# Patient Record
Sex: MALE | Race: WHITE | Employment: FULL TIME | URBAN - METROPOLITAN AREA
[De-identification: names, ages, dates, MRNs, and addresses within clinical notes are randomized per-mention and may not be internally consistent; named-entity substitution may affect disease eponyms.]

---

## 2017-02-20 ENCOUNTER — TELEPHONE (OUTPATIENT)
Dept: CARDIOLOGY CLINIC | Age: 47
End: 2017-02-20

## 2017-03-17 ENCOUNTER — OFFICE VISIT (OUTPATIENT)
Dept: CARDIOLOGY CLINIC | Age: 47
End: 2017-03-17

## 2017-03-17 VITALS
HEART RATE: 68 BPM | WEIGHT: 221 LBS | DIASTOLIC BLOOD PRESSURE: 72 MMHG | SYSTOLIC BLOOD PRESSURE: 100 MMHG | OXYGEN SATURATION: 96 % | BODY MASS INDEX: 30.82 KG/M2

## 2017-03-17 DIAGNOSIS — I25.10 CORONARY ARTERY DISEASE INVOLVING NATIVE CORONARY ARTERY OF NATIVE HEART WITHOUT ANGINA PECTORIS: Primary | ICD-10-CM

## 2017-03-17 DIAGNOSIS — I10 ESSENTIAL HYPERTENSION: ICD-10-CM

## 2017-03-17 DIAGNOSIS — E78.2 MIXED HYPERLIPIDEMIA: ICD-10-CM

## 2017-03-17 PROCEDURE — 99214 OFFICE O/P EST MOD 30 MIN: CPT | Performed by: NURSE PRACTITIONER

## 2017-05-03 RX ORDER — CLOPIDOGREL BISULFATE 75 MG/1
75 TABLET ORAL DAILY
Qty: 90 TABLET | Refills: 1 | Status: SHIPPED | OUTPATIENT
Start: 2017-05-03 | End: 2017-10-31 | Stop reason: SDUPTHER

## 2017-08-21 ENCOUNTER — OFFICE VISIT (OUTPATIENT)
Dept: CARDIOLOGY CLINIC | Age: 47
End: 2017-08-21

## 2017-08-21 VITALS
SYSTOLIC BLOOD PRESSURE: 130 MMHG | HEART RATE: 70 BPM | HEIGHT: 71 IN | BODY MASS INDEX: 29.68 KG/M2 | DIASTOLIC BLOOD PRESSURE: 80 MMHG | WEIGHT: 212 LBS

## 2017-08-21 DIAGNOSIS — I25.10 CORONARY ARTERY DISEASE INVOLVING NATIVE CORONARY ARTERY OF NATIVE HEART WITHOUT ANGINA PECTORIS: Primary | ICD-10-CM

## 2017-08-21 DIAGNOSIS — R31.9 HEMATURIA: ICD-10-CM

## 2017-08-21 DIAGNOSIS — I10 ESSENTIAL HYPERTENSION: ICD-10-CM

## 2017-08-21 DIAGNOSIS — E78.00 HYPERCHOLESTEREMIA: ICD-10-CM

## 2017-08-21 PROCEDURE — 99214 OFFICE O/P EST MOD 30 MIN: CPT | Performed by: INTERNAL MEDICINE

## 2017-08-21 RX ORDER — ASPIRIN 81 MG/1
81 TABLET ORAL DAILY
Qty: 30 TABLET | Refills: 11 | Status: ON HOLD
Start: 2017-08-21 | End: 2022-08-05 | Stop reason: HOSPADM

## 2017-10-31 RX ORDER — CLOPIDOGREL BISULFATE 75 MG/1
TABLET ORAL
Qty: 90 TABLET | Refills: 1 | Status: SHIPPED | OUTPATIENT
Start: 2017-10-31 | End: 2018-03-23 | Stop reason: SDUPTHER

## 2018-02-06 RX ORDER — AMLODIPINE BESYLATE 5 MG/1
TABLET ORAL
Qty: 90 TABLET | Refills: 3 | Status: SHIPPED | OUTPATIENT
Start: 2018-02-06 | End: 2018-09-12 | Stop reason: SDUPTHER

## 2018-03-23 ENCOUNTER — OFFICE VISIT (OUTPATIENT)
Dept: CARDIOLOGY CLINIC | Age: 48
End: 2018-03-23

## 2018-03-23 VITALS
HEART RATE: 77 BPM | OXYGEN SATURATION: 94 % | BODY MASS INDEX: 29.72 KG/M2 | HEIGHT: 71 IN | DIASTOLIC BLOOD PRESSURE: 86 MMHG | WEIGHT: 212.3 LBS | SYSTOLIC BLOOD PRESSURE: 116 MMHG

## 2018-03-23 DIAGNOSIS — I25.10 CORONARY ARTERY DISEASE INVOLVING NATIVE CORONARY ARTERY OF NATIVE HEART WITHOUT ANGINA PECTORIS: Primary | ICD-10-CM

## 2018-03-23 DIAGNOSIS — E78.00 HYPERCHOLESTEREMIA: ICD-10-CM

## 2018-03-23 DIAGNOSIS — I10 ESSENTIAL HYPERTENSION: ICD-10-CM

## 2018-03-23 DIAGNOSIS — Z72.0 TOBACCO ABUSE: ICD-10-CM

## 2018-03-23 PROCEDURE — 99214 OFFICE O/P EST MOD 30 MIN: CPT | Performed by: INTERNAL MEDICINE

## 2018-03-23 RX ORDER — CLOPIDOGREL BISULFATE 75 MG/1
TABLET ORAL
Qty: 90 TABLET | Refills: 3 | Status: SHIPPED | OUTPATIENT
Start: 2018-03-23 | End: 2018-09-12 | Stop reason: SDUPTHER

## 2018-03-23 NOTE — COMMUNICATION BODY
[address]       H+P // CONSULT // OUTPATIENT VISIT // FOLLOWUP VISIT     Referring Doctor Amalia Mahmood [] Acute [x] Chronic     Symptom [x] None [] CP [] SOB [] Dizzy [] Palps [] Fatigue     Problems CAD, HTN, CHOL, Tobacco      HISTORY OF PRESENT ILLNESS     Doing well. Denies cp, sob. Compliant with meds. Not exercising. No statin due to myalgias. Symptom Y N Frequency Duration Severity Modifying Assoc Sx Other   CP [] [x]         SOB [] [x]         Dizzy [] [x]         Syncope [] [x]         Palpitations [] [x]           COMPLIANCE     Category Meds Diet Salt Exercise Tobacco Alcohol Drugs   Compliant [x] [x] [x] [] [x] [x] [x]   [x]Counseling given on all above above categories    HISTORY/ALLERGIES/ROS     MedHx:  has a past medical history of Hypertension and MI (mitral incompetence). SurgHx:  has a past surgical history that includes Coronary angioplasty with stent; Appendectomy; and Nasal septum surgery (11/2014). SocHx:   reports that he has been smoking Cigarettes. He has been smoking about 0.50 packs per day. He has never used smokeless tobacco. He reports that he does not drink alcohol or use drugs. FamHx: family history includes Diabetes in his father and mother; Heart Disease in his father; High Blood Pressure in his father and mother; High Cholesterol in his father and mother.    Allergies: Fenofibrate and Statins   ROS:  [x]Full ROS obtained and negative except as mentioned in HPI     MEDICATIONS      Current Outpatient Prescriptions   Medication Sig Dispense Refill    amLODIPine (NORVASC) 5 MG tablet TAKE 1 TABLET DAILY AS NEEDED IF BLOOD PRESSURE IS GREATER THAN 130/90 90 tablet 3    clopidogrel (PLAVIX) 75 MG tablet TAKE 1 TABLET DAILY 90 tablet 1    aspirin 81 MG EC tablet Take 1 tablet by mouth daily 30 tablet 11    metoprolol tartrate (LOPRESSOR) 25 MG tablet Take 1 tablet by mouth 2 times daily 3rd []Other:

## 2018-09-07 PROBLEM — E78.00 HYPERCHOLESTEREMIA: Status: ACTIVE | Noted: 2018-09-07

## 2018-09-07 PROBLEM — Z72.0 TOBACCO ABUSE: Status: ACTIVE | Noted: 2018-09-07

## 2018-09-07 NOTE — PROGRESS NOTES
[facility] [facility] [address]       H+P // CONSULT // OUTPATIENT VISIT // FOLLOWUP VISIT     Referring Doctor Bonnie Travis [] Acute [x] Chronic     Symptom [x] None [] CP [] SOB [] Dizzy [] Palps [] Fatigue     Problems CAD, HTN, CHOL, Tobacco      HISTORY OF PRESENT ILLNESS     Doing well. Denies cp, sob. Compliant with meds. Not exercising. No statin due to myalgias. Working long hours. Symptom Y N Frequency Duration Severity Modifying Assoc Sx Other   CP [] [x]         SOB [] [x]         Dizzy [] [x]         Syncope [] [x]         Palpitations [] [x]           COMPLIANCE     Category Meds Diet Salt Exercise Tobacco Alcohol Drugs   Compliant [x] [x] [x] [] [x] [x] [x]   [x]Counseling given on all above above categories    HISTORY/ALLERGIES/ROS     MedHx:  has a past medical history of Hypertension and MI (mitral incompetence). SurgHx:  has a past surgical history that includes Coronary angioplasty with stent; Appendectomy; and Nasal septum surgery (11/2014). SocHx:   reports that he has been smoking Cigarettes. He has been smoking about 0.50 packs per day. He has never used smokeless tobacco. He reports that he does not drink alcohol or use drugs. FamHx: family history includes Diabetes in his father and mother; Heart Disease in his father; High Blood Pressure in his father and mother; High Cholesterol in his father and mother.    Allergies: Fenofibrate and Statins   ROS:  [x]Full ROS obtained and negative except as mentioned in HPI     MEDICATIONS      Current Outpatient Prescriptions   Medication Sig Dispense Refill    clopidogrel (PLAVIX) 75 MG tablet TAKE 1 TABLET DAILY 90 tablet 3    metoprolol tartrate (LOPRESSOR) 25 MG tablet Take 1 tablet by mouth 2 times daily 3rd dose if HR up 180 tablet 3    amLODIPine (NORVASC) 5 MG tablet TAKE 1 TABLET DAILY AS NEEDED IF BLOOD PRESSURE IS GREATER THAN 130/90 90 tablet 3    aspirin 81 MG EC tablet Take 1 tablet by mouth daily 30 tablet 11    clonazePAM (KLONOPIN) 0.5 MG tablet 1 mg 2 times daily       tadalafil (CIALIS) 20 MG tablet Take 1 tablet by mouth as needed for Erectile Dysfunction 3 tablet 0    ALPRAZolam (XANAX) 1 MG tablet Take 1 mg by mouth 2 times daily        No current facility-administered medications for this visit.       Reviewed with patient and will remain unchanged except as mentioned in A/P  PHYSICAL EXAM     Vitals:    09/12/18 1117   BP: 126/76   Pulse: 72      Gen Alert, coop, no distress Heart  RRR, no MRG, nl apic impulse   Head NC, AT, no abnorm Abd  Soft, NT, +BS, no mass, no OM   Eyes PERRLA, conj/corn clear Ext  Ext nl, AT, no C/C/E   Nose Nares nl, no drain, NT Pulse 2+ and symmetric   Throat Lips, mucosa, tongue nl Skin Color/text/turg nl, no rash/lesions   Neck S/S, TM, NT, no bruit/JVD Psych Nl mood and affect   Lung CTA-B, unlabored, no DTP Lymph   No cervical or axillary LA   Ch wall NT, no deform Neuro  Nl gross M/S exam     ASSESSMENT AND PLAN      ~CAD   Date EF Results   Sx   No concerning   [facility]   [x]I         []II           []III          []IV   Hx   MVPCI   [facility] 10/10  11/16 40% PCI LAD and RCA (B [place])  PCI of LAD/kissing balloon diagonal with 1 KARLY   MPI 10/15 71% Small apical infarct, no ischemia   TTE 1/14 55% Apical anterior hk   Plan   Continue aggressive medical treatment at doses above   ~HTN  Today BP [x] Controlled [] Borderline [] Uncontrolled   Counseling [x] Diet/Salt [x] Exercise [x] Weight    Plan Continue current medications at doses detailed above  ~Hyperchol  Goal LDL [] <100 [x] <70     Counseling [x] Diet [x] Weight [x] Exercise   Lipid/liver Monitor [x] PCP [] Cardio [] Endocrine   Plan Allergic to statins, not interested in injectables at this time  6/18 HDL:23, LDL:157  ~Tobacco  Status [x] Smoker [] Previous smoker   Counseling [x] Risks [x] Cessation   Plan Continued avoidance of first and second hand smoke  ~Followup  Interval: 6

## 2018-09-12 ENCOUNTER — OFFICE VISIT (OUTPATIENT)
Dept: CARDIOLOGY CLINIC | Age: 48
End: 2018-09-12

## 2018-09-12 VITALS
DIASTOLIC BLOOD PRESSURE: 76 MMHG | SYSTOLIC BLOOD PRESSURE: 126 MMHG | HEART RATE: 72 BPM | BODY MASS INDEX: 28.84 KG/M2 | HEIGHT: 71 IN | WEIGHT: 206 LBS

## 2018-09-12 DIAGNOSIS — I25.10 CORONARY ARTERY DISEASE INVOLVING NATIVE CORONARY ARTERY OF NATIVE HEART WITHOUT ANGINA PECTORIS: Primary | ICD-10-CM

## 2018-09-12 DIAGNOSIS — E78.00 HYPERCHOLESTEREMIA: ICD-10-CM

## 2018-09-12 DIAGNOSIS — I10 ESSENTIAL HYPERTENSION: ICD-10-CM

## 2018-09-12 DIAGNOSIS — Z72.0 TOBACCO ABUSE: ICD-10-CM

## 2018-09-12 PROCEDURE — 99214 OFFICE O/P EST MOD 30 MIN: CPT | Performed by: INTERNAL MEDICINE

## 2018-09-12 RX ORDER — CLOPIDOGREL BISULFATE 75 MG/1
TABLET ORAL
Qty: 90 TABLET | Refills: 3 | Status: SHIPPED | OUTPATIENT
Start: 2018-09-12 | End: 2019-09-09 | Stop reason: SDUPTHER

## 2018-09-12 RX ORDER — AMLODIPINE BESYLATE 5 MG/1
5 TABLET ORAL DAILY
Qty: 90 TABLET | Refills: 3 | Status: SHIPPED | OUTPATIENT
Start: 2018-09-12 | End: 2019-10-08 | Stop reason: SDUPTHER

## 2018-09-12 NOTE — COMMUNICATION BODY
[address]       H+P // CONSULT // OUTPATIENT VISIT // FOLLOWUP VISIT     Referring Doctor Estella Nieves [] Acute [x] Chronic     Symptom [x] None [] CP [] SOB [] Dizzy [] Palps [] Fatigue     Problems CAD, HTN, CHOL, Tobacco      HISTORY OF PRESENT ILLNESS     Doing well. Denies cp, sob. Compliant with meds. Not exercising. No statin due to myalgias. Working long hours. Symptom Y N Frequency Duration Severity Modifying Assoc Sx Other   CP [] [x]         SOB [] [x]         Dizzy [] [x]         Syncope [] [x]         Palpitations [] [x]           COMPLIANCE     Category Meds Diet Salt Exercise Tobacco Alcohol Drugs   Compliant [x] [x] [x] [] [x] [x] [x]   [x]Counseling given on all above above categories    HISTORY/ALLERGIES/ROS     MedHx:  has a past medical history of Hypertension and MI (mitral incompetence). SurgHx:  has a past surgical history that includes Coronary angioplasty with stent; Appendectomy; and Nasal septum surgery (11/2014). SocHx:   reports that he has been smoking Cigarettes. He has been smoking about 0.50 packs per day. He has never used smokeless tobacco. He reports that he does not drink alcohol or use drugs. FamHx: family history includes Diabetes in his father and mother; Heart Disease in his father; High Blood Pressure in his father and mother; High Cholesterol in his father and mother.    Allergies: Fenofibrate and Statins   ROS:  [x]Full ROS obtained and negative except as mentioned in HPI     MEDICATIONS      Current Outpatient Prescriptions   Medication Sig Dispense Refill    clopidogrel (PLAVIX) 75 MG tablet TAKE 1 TABLET DAILY 90 tablet 3    metoprolol tartrate (LOPRESSOR) 25 MG tablet Take 1 tablet by mouth 2 times daily 3rd dose if HR up 180 tablet 3    amLODIPine (NORVASC) 5 MG tablet TAKE 1 TABLET DAILY AS NEEDED IF BLOOD PRESSURE IS GREATER THAN 130/90 90 tablet 3    aspirin 81 MG EC months, early appt  Tests/Labs: None    Scribe Attestation  I, Lora Vega, am scribing for and in the presence of Varsha Vick MD.   Signed, Lora Vega 09/07/18 2:00 PM   Provider [address] is working as a scribe for and in the presence of kiera Vick MD). Working as a scribe, Lora Vega may have prepopulated components of this note with my historical  intellectual property under my direct supervision. Any additions to this intellectual property were performed in my presence and at my direction. Furthermore, the content and accuracy of this note have been reviewed by kiera Vick MD).

## 2018-09-12 NOTE — LETTER
[address]  [address] [place] [address] [address] [address]  Phone: [phone]  Fax: [phone]    Tripp Grijalva MD        September 12, 2018     Yan REYES  [address] [address]    Patient: Surekha Talamantes  MR Number: [id number]  YOB: 1970  Date of Visit: 9/12/2018    Dear Dr. Yan REYES:    Below are the relevant portions of my assessment and plan of care. [address]       H+P // CONSULT // OUTPATIENT VISIT // FOLLOWUP VISIT     Referring Doctor Mel Lowry [] Acute [x] Chronic     Symptom [x] None [] CP [] SOB [] Dizzy [] Palps [] Fatigue     Problems CAD, HTN, CHOL, Tobacco      HISTORY OF PRESENT ILLNESS     Doing well. Denies cp, sob. Compliant with meds. Not exercising. No statin due to myalgias. Working long hours. Symptom Y N Frequency Duration Severity Modifying Assoc Sx Other   CP [] [x]         SOB [] [x]         Dizzy [] [x]         Syncope [] [x]         Palpitations [] [x]           COMPLIANCE     Category Meds Diet Salt Exercise Tobacco Alcohol Drugs   Compliant [x] [x] [x] [] [x] [x] [x]   [x]Counseling given on all above above categories    HISTORY/ALLERGIES/ROS     MedHx:  has a past medical history of Hypertension and MI (mitral incompetence). SurgHx:  has a past surgical history that includes Coronary angioplasty with stent; Appendectomy; and Nasal septum surgery (11/2014). SocHx:   reports that he has been smoking Cigarettes. He has been smoking about 0.50 packs per day. He has never used smokeless tobacco. He reports that he does not drink alcohol or use drugs. FamHx: family history includes Diabetes in his father and mother; Heart Disease in his father; High Blood Pressure in his father and mother; High Cholesterol in his father and mother.    Allergies: Fenofibrate and Statins ROS:  [x]Full ROS obtained and negative except as mentioned in HPI     MEDICATIONS      Current Outpatient Prescriptions   Medication Sig Dispense Refill    clopidogrel (PLAVIX) 75 MG tablet TAKE 1 TABLET DAILY 90 tablet 3    metoprolol tartrate (LOPRESSOR) 25 MG tablet Take 1 tablet by mouth 2 times daily 3rd dose if HR up 180 tablet 3    amLODIPine (NORVASC) 5 MG tablet TAKE 1 TABLET DAILY AS NEEDED IF BLOOD PRESSURE IS GREATER THAN 130/90 90 tablet 3    aspirin 81 MG EC tablet Take 1 tablet by mouth daily 30 tablet 11    clonazePAM (KLONOPIN) 0.5 MG tablet 1 mg 2 times daily       tadalafil (CIALIS) 20 MG tablet Take 1 tablet by mouth as needed for Erectile Dysfunction 3 tablet 0    ALPRAZolam (XANAX) 1 MG tablet Take 1 mg by mouth 2 times daily        No current facility-administered medications for this visit.       Reviewed with patient and will remain unchanged except as mentioned in A/P  PHYSICAL EXAM     Vitals:    09/12/18 1117   BP: 126/76   Pulse: 72      Gen Alert, coop, no distress Heart  RRR, no MRG, nl apic impulse   Head NC, AT, no abnorm Abd  Soft, NT, +BS, no mass, no OM   Eyes PERRLA, conj/corn clear Ext  Ext nl, AT, no C/C/E   Nose Nares nl, no drain, NT Pulse 2+ and symmetric   Throat Lips, mucosa, tongue nl Skin Color/text/turg nl, no rash/lesions   Neck S/S, TM, NT, no bruit/JVD Psych Nl mood and affect   Lung CTA-B, unlabored, no DTP Lymph   No cervical or axillary LA   Ch wall NT, no deform Neuro  Nl gross M/S exam     ASSESSMENT AND PLAN      ~CAD   Date EF Results   Sx   No concerning   [facility]   [x]I         []II           []III          []IV   Hx   MVPCI   [facility] 10/10  11/16 40% PCI LAD and RCA (B [place])  PCI of LAD/kissing balloon diagonal with 1 KARLY   MPI 10/15 71% Small apical infarct, no ischemia   TTE 1/14 55% Apical anterior hk   Plan   Continue aggressive medical treatment at doses above   ~HTN  Today BP [x] Controlled [] Borderline [] Uncontrolled Counseling [x] Diet/Salt [x] Exercise [x] Weight    Plan Continue current medications at doses detailed above  ~Hyperchol  Goal LDL [] <100 [x] <70     Counseling [x] Diet [x] Weight [x] Exercise   Lipid/liver Monitor [x] PCP [] Cardio [] Endocrine   Plan Allergic to statins, not interested in injectables at this time  6/18 HDL:23, LDL:157  ~Tobacco  Status [x] Smoker [] Previous smoker   Counseling [x] Risks [x] Cessation   Plan Continued avoidance of first and second hand smoke  ~Followup  Interval: 6 months, early appt  Tests/Labs: None    Scribe Attestation  Abelardo HUMPHREY, am scribing for and in the presence of Lelia Jaeger MD.   Signed, Abelardo Perez 09/07/18 2:00 PM   Provider Margaux Veliz is working as a scribe for and in the presence of me (Lelia Jaeger MD). Working as a scribe, Abelardo Perez may have prepopulated components of this note with my historical  intellectual property under my direct supervision. Any additions to this intellectual property were performed in my presence and at my direction. Furthermore, the content and accuracy of this note have been reviewed by me Lelia Jaeger MD). If you have questions, please do not hesitate to call me. I look forward to following Juan Diego Sheffield along with you.     Sincerely,        Iggy Lainez MD

## 2019-04-29 NOTE — PATIENT INSTRUCTIONS
If you need to take Ibuprofen, take it at least 2 hours after you take your aspirin. Try to avoid antiinflammatories.

## 2019-04-29 NOTE — PROGRESS NOTES
[address]     H+P // CONSULT // OUTPATIENT VISIT // FOLLOWUP VISIT     Referring Doctor Shan Montes   Encounter Type Followup     CHIEF COMPLAINT     Visit Type Chronic   Symptoms None   Problems CAD, HTN, CHOL, TOB     HISTORY OF PRESENT ILLNESS      Doing well. No new concerns. Mild pain from opening cabinet just under collarbone right side. BP in good range at home. Losing weight with brother. Still smoking.  Denies cp, sob, dizziness, syncope, palpitations.  Compliant with CV meds listed below without notable side effects. COMPLIANCE     Category Meds Diet Salt Exercise Tobacco Alcohol Drugs   Compliant [x] [x] [x] [x] [] [x] [x]   [x]Counseling given on all above above categories    HISTORY/ALLERGIES/ROS     MedHx:  has a past medical history of Hypertension and MI (mitral incompetence). SurgHx:  has a past surgical history that includes Coronary angioplasty with stent; Appendectomy; and Nasal septum surgery (11/2014). SocHx:   reports that he has been smoking cigarettes. He has been smoking about 0.50 packs per day. He has never used smokeless tobacco. He reports that he does not drink alcohol or use drugs. FamHx: family history includes Diabetes in his father and mother; Heart Disease in his father; High Blood Pressure in his father and mother; High Cholesterol in his father and mother.    Allergies: Fenofibrate and Statins   ROS:  [x]Full ROS obtained and negative except as mentioned in HPI     MEDICATIONS      Current Outpatient Medications   Medication Sig Dispense Refill    clopidogrel (PLAVIX) 75 MG tablet TAKE 1 TABLET DAILY 90 tablet 3    metoprolol tartrate (LOPRESSOR) 25 MG tablet Take 1 tablet by mouth 2 times daily 3rd dose if HR up 180 tablet 3    amLODIPine (NORVASC) 5 MG tablet Take 1 tablet by mouth daily 90 tablet 3    aspirin 81 MG EC tablet Take 1 tablet by mouth daily 30 tablet 11    clonazePAM (KLONOPIN) 0.5 MG tablet 1 mg 2 times daily  tadalafil (CIALIS) 20 MG tablet Take 1 tablet by mouth as needed for Erectile Dysfunction 3 tablet 0    ALPRAZolam (XANAX) 1 MG tablet Take 1 mg by mouth 2 times daily        No current facility-administered medications for this visit.       Reviewed with patient and will remain unchanged except as mentioned in A/P  PHYSICAL EXAM     Vitals:    05/06/19 0843   BP: 122/60   Pulse: 58      Gen Alert, coop, no distress Heart  RRR, no MRG, nl apical impulse   Head NC, AT, no abnorm Abd  Soft, NT, +BS, no mass, no OM   Eyes PER, conj/corn clear Ext  Ext nl, AT, no C/C/E   Nose Nares nl, no drain, NT Pulse 2+ and symmetric   Throat Lips, mucosa, tongue nl Skin Col/text/turg nl, no vis rash/les   Neck S/S, TM, NT, no bruit/JVD Psych Nl mood and affect   Lung CTA-B, unlabored, no DTP Lymph   No cervical or axillary LA   Ch wall NT, no deform Neuro  Nl gross M/S exam     ASSESSMENT AND PLAN      ~CAD   Date EF Results   Sx   No concerning   [facility]   [x]I         []II           []III          []IV   Hx   MVPCI   [facility] 10/10  11/16 40% PCI LAD and RCA (B [place])  PCI of LAD/kissing balloon diagonal with 1 KARLY   MPI 10/15 71% Small apical infarct, no ischemia   TTE 1/14 55% Apical anterior hk   Plan   Continue aggressive medical treatment at doses above   ~HTN  Today BP Controlled   Counseling Counseled on diet/salt, exercise and ideal body weight   Plan Continue current meds at doses above   ~Hyperchol  Goal LDL <70   Counseling Counseled on diet, exercise and ideal body weight   Plan PCP liver/lipid surveillance  Allergic to statins, does not want injectables   6/18 HDL:23, LDL:157  ~Tobacco  Status [x] Smoker [] Previous smoker   Counseling [x] Risks [x] Cessation   Plan Continued avoidance of first and second hand smoke  ~Followup  Interval: 6 months  Tests/Labs: None    Scribe Attestation  I, Dennie Saupe, am scribing for and in the presence of Leah Wheatley MD.   Signed, Dennie Saupe 04/29/19 10:53 AM   Provider Wil Ward is working as a scribe for and in the presence of kiera Echevarria MD). Working as a scribe, Syds Rima may have prepopulated components of this note with my historical  intellectual property under my direct supervision. Any additions to this intellectual property were performed in my presence and at my direction.   Furthermore, the content and accuracy of this note have been reviewed by kiera Echevarria MD).  5/6/2019 8:54 AM

## 2019-05-06 ENCOUNTER — OFFICE VISIT (OUTPATIENT)
Dept: CARDIOLOGY CLINIC | Age: 49
End: 2019-05-06
Payer: COMMERCIAL

## 2019-05-06 VITALS
HEART RATE: 58 BPM | BODY MASS INDEX: 28.73 KG/M2 | SYSTOLIC BLOOD PRESSURE: 122 MMHG | DIASTOLIC BLOOD PRESSURE: 60 MMHG | HEIGHT: 71 IN

## 2019-05-06 DIAGNOSIS — I25.10 CORONARY ARTERY DISEASE INVOLVING NATIVE CORONARY ARTERY OF NATIVE HEART WITHOUT ANGINA PECTORIS: Primary | ICD-10-CM

## 2019-05-06 DIAGNOSIS — I10 ESSENTIAL HYPERTENSION: ICD-10-CM

## 2019-05-06 DIAGNOSIS — Z72.0 TOBACCO ABUSE: ICD-10-CM

## 2019-05-06 DIAGNOSIS — E78.00 HYPERCHOLESTEREMIA: ICD-10-CM

## 2019-05-06 PROCEDURE — 99214 OFFICE O/P EST MOD 30 MIN: CPT | Performed by: INTERNAL MEDICINE

## 2019-05-06 NOTE — LETTER
[address] [place] [address] [address] [place] [address] [address] [address]  Phone: [phone]  Fax: [phone]    Yadi Soliman MD        May 6, 2019     Sean REYES  [address] [address]    Patient: Ma Schilder  MR Number: [id number]  YOB: 1970  Date of Visit: 5/6/2019    Dear Dr. Sean HDEZT:      [address]     H+P // CONSULT // OUTPATIENT VISIT // FOLLOWUP VISIT     Referring Doctor Yao Galdamez   Encounter Type Followup     CHIEF COMPLAINT     Visit Type Chronic   Symptoms None   Problems CAD, HTN, CHOL, TOB     HISTORY OF PRESENT ILLNESS     ? Doing well. No new concerns. Mild pain from opening cabinet just under collarbone right side. BP in good range at home. Losing weight with brother. Still smoking. ? Denies cp, sob, dizziness, syncope, palpitations. ? Compliant with CV meds listed below without notable side effects. COMPLIANCE     Category Meds Diet Salt Exercise Tobacco Alcohol Drugs   Compliant [x] [x] [x] [x] [] [x] [x]   [x]Counseling given on all above above categories    HISTORY/ALLERGIES/ROS     MedHx:  has a past medical history of Hypertension and MI (mitral incompetence). SurgHx:  has a past surgical history that includes Coronary angioplasty with stent; Appendectomy; and Nasal septum surgery (11/2014). SocHx:   reports that he has been smoking cigarettes. He has been smoking about 0.50 packs per day. He has never used smokeless tobacco. He reports that he does not drink alcohol or use drugs. FamHx: family history includes Diabetes in his father and mother; Heart Disease in his father; High Blood Pressure in his father and mother; High Cholesterol in his father and mother.    Allergies: Fenofibrate and Statins   ROS:  [x]Full ROS obtained and negative except as mentioned in HPI     MEDICATIONS      Current Outpatient Medications   Medication Sig Dispense Refill  clopidogrel (PLAVIX) 75 MG tablet TAKE 1 TABLET DAILY 90 tablet 3    metoprolol tartrate (LOPRESSOR) 25 MG tablet Take 1 tablet by mouth 2 times daily 3rd dose if HR up 180 tablet 3    amLODIPine (NORVASC) 5 MG tablet Take 1 tablet by mouth daily 90 tablet 3    aspirin 81 MG EC tablet Take 1 tablet by mouth daily 30 tablet 11    clonazePAM (KLONOPIN) 0.5 MG tablet 1 mg 2 times daily       tadalafil (CIALIS) 20 MG tablet Take 1 tablet by mouth as needed for Erectile Dysfunction 3 tablet 0    ALPRAZolam (XANAX) 1 MG tablet Take 1 mg by mouth 2 times daily        No current facility-administered medications for this visit.       Reviewed with patient and will remain unchanged except as mentioned in A/P  PHYSICAL EXAM     Vitals:    05/06/19 0843   BP: 122/60   Pulse: 58      Gen Alert, coop, no distress Heart  RRR, no MRG, nl apical impulse   Head NC, AT, no abnorm Abd  Soft, NT, +BS, no mass, no OM   Eyes PER, conj/corn clear Ext  Ext nl, AT, no C/C/E   Nose Nares nl, no drain, NT Pulse 2+ and symmetric   Throat Lips, mucosa, tongue nl Skin Col/text/turg nl, no vis rash/les   Neck S/S, TM, NT, no bruit/JVD Psych Nl mood and affect   Lung CTA-B, unlabored, no DTP Lymph   No cervical or axillary LA   Ch wall NT, no deform Neuro  Nl gross M/S exam     ASSESSMENT AND PLAN      ~CAD   Date EF Results   Sx   No concerning   [facility]   [x]I         []II           []III          []IV   Hx   MVPCI   [facility] 10/10  11/16 40% PCI LAD and RCA (B [place])  PCI of LAD/kissing balloon diagonal with 1 KARLY   MPI 10/15 71% Small apical infarct, no ischemia   TTE 1/14 55% Apical anterior hk   Plan   Continue aggressive medical treatment at doses above   ~HTN  Today BP Controlled   Counseling Counseled on diet/salt, exercise and ideal body weight   Plan Continue current meds at doses above   ~Hyperchol  Goal LDL <70   Counseling Counseled on diet, exercise and ideal body weight   Plan PCP liver/lipid surveillance

## 2019-09-09 RX ORDER — CLOPIDOGREL BISULFATE 75 MG/1
TABLET ORAL
Qty: 90 TABLET | Refills: 4 | Status: SHIPPED | OUTPATIENT
Start: 2019-09-09 | End: 2019-11-08 | Stop reason: ALTCHOICE

## 2019-10-10 RX ORDER — AMLODIPINE BESYLATE 5 MG/1
TABLET ORAL
Qty: 90 TABLET | Refills: 4 | Status: SHIPPED | OUTPATIENT
Start: 2019-10-10 | End: 2020-12-31

## 2019-11-08 ENCOUNTER — OFFICE VISIT (OUTPATIENT)
Dept: CARDIOLOGY CLINIC | Age: 49
End: 2019-11-08
Payer: COMMERCIAL

## 2019-11-08 VITALS
HEIGHT: 71 IN | HEART RATE: 68 BPM | RESPIRATION RATE: 18 BRPM | BODY MASS INDEX: 29.12 KG/M2 | DIASTOLIC BLOOD PRESSURE: 72 MMHG | SYSTOLIC BLOOD PRESSURE: 128 MMHG | WEIGHT: 208 LBS

## 2019-11-08 DIAGNOSIS — I25.10 CORONARY ARTERY DISEASE INVOLVING NATIVE CORONARY ARTERY OF NATIVE HEART WITHOUT ANGINA PECTORIS: Primary | ICD-10-CM

## 2019-11-08 DIAGNOSIS — E78.00 HYPERCHOLESTEREMIA: ICD-10-CM

## 2019-11-08 DIAGNOSIS — I10 ESSENTIAL HYPERTENSION: ICD-10-CM

## 2019-11-08 DIAGNOSIS — Z72.0 TOBACCO ABUSE: ICD-10-CM

## 2019-11-08 PROCEDURE — 99214 OFFICE O/P EST MOD 30 MIN: CPT | Performed by: INTERNAL MEDICINE

## 2020-02-24 NOTE — TELEPHONE ENCOUNTER
RX APPROVAL:      Refill:   Requested Prescriptions     Pending Prescriptions Disp Refills    metoprolol tartrate (LOPRESSOR) 25 MG tablet [Pharmacy Med Name: METOPROLOL TARTRATE TABS 25MG] 180 tablet 6     Sig: TAKE 1 TABLET TWICE A DAY, THIRD DOSE IF HEART RATE IS UP      Last OV: 11/8/19  Last EKG:   Last Labs: Results in Care Everywhere.    Most recent Deneen Guidry results list below: [facility] 3/15/19  Lab Results   Component Value Date    GLUCOSE 99 11/01/2016    BUN 13 11/01/2016    CREATININE 0.9 11/01/2016    LABGLOM >60 11/01/2016     11/01/2016    K 4.0 11/01/2016     11/01/2016    CO2 26 11/01/2016    CALCIUM 9.2 11/01/2016     Lab Results   Component Value Date     11/01/2016     11/01/2016    CO2 26 11/01/2016    ANIONGAP 11 11/01/2016    GLUCOSE 99 11/01/2016    BUN 13 11/01/2016    CREATININE 0.9 11/01/2016    LABGLOM >60 11/01/2016    GFRAA >60 11/01/2016    GFRAA >60 06/23/2012    CALCIUM 9.2 11/01/2016     No results found for: ALT, AST  Lab Results   Component Value Date    K 4.0 11/01/2016       Plan and labs reviewed

## 2020-05-06 PROBLEM — I25.83 CORONARY ARTERY DISEASE DUE TO LIPID RICH PLAQUE: Status: ACTIVE | Noted: 2020-05-06

## 2020-05-06 PROBLEM — I25.10 CORONARY ARTERY DISEASE DUE TO LIPID RICH PLAQUE: Status: ACTIVE | Noted: 2020-05-06

## 2020-05-19 NOTE — PROGRESS NOTES
[facility] [employer] [facility] [address]     H+P // CONSULT // OUTPATIENT VISIT // FOLLOWUP VISIT     Referring Doctor Chandana Sanchez   Encounter Type Followup     CHIEF COMPLAINT     Visit Type Chronic   Symptoms None   Problems CAD, HTN, CHOL, TOB     HISTORY OF PRESENT ILLNESS      GEN - Doing well. No new concerns.  CAD - Denies cp, sob, dizziness, syncope, palpitations.  HTN - Ambulatory BP readings in good range. No HA or dizziness.  CHOL - Last cholesterol reviewed and in good range. Tolerating statin without side effects.  TOB -  Continues to smoke 1ppd. Not interested in cessation.  MED - Compliant with CV meds listed below without notable side effects. HISTORY/ALLERGIES/ROS     MedHx:  has a past medical history of Hypertension and MI (mitral incompetence). SurgHx:  has a past surgical history that includes Coronary angioplasty with stent; Appendectomy; and Nasal septum surgery (11/2014). SocHx:  reports that he has been smoking cigarettes. He has been smoking about 0.50 packs per day. He has never used smokeless tobacco. He reports that he does not drink alcohol or use drugs. FamHx:  family history includes Diabetes in his father and mother; Heart Disease in his father; High Blood Pressure in his father and mother; High Cholesterol in his father and mother.    Allergies: Fenofibrate and Statins   ROS:  [x]Full ROS obtained and negative except as mentioned in HPI     MEDICATIONS      Current Outpatient Medications   Medication Sig Dispense Refill    metoprolol tartrate (LOPRESSOR) 25 MG tablet TAKE 1 TABLET TWICE A DAY, THIRD DOSE IF HEART RATE IS  tablet 1    amLODIPine (NORVASC) 5 MG tablet TAKE 1 TABLET DAILY 90 tablet 4    aspirin 81 MG EC tablet Take 1 tablet by mouth daily 30 tablet 11    clonazePAM (KLONOPIN) 0.5 MG tablet 1 mg 2 times daily       tadalafil (CIALIS) 20 MG tablet Take 1 tablet by mouth as needed for Erectile Dysfunction 3 tablet 0     No current components of this note with my historical  intellectual property under my direct supervision. Any additions to this intellectual property were performed in my presence and at my direction.   Furthermore, the content and accuracy of this note have been reviewed by me Yash Weiner MD).  5/21/2020 7:32 AM

## 2020-05-21 ENCOUNTER — OFFICE VISIT (OUTPATIENT)
Dept: CARDIOLOGY CLINIC | Age: 50
End: 2020-05-21
Payer: COMMERCIAL

## 2020-05-21 VITALS
HEART RATE: 61 BPM | HEIGHT: 71 IN | WEIGHT: 208.1 LBS | DIASTOLIC BLOOD PRESSURE: 78 MMHG | OXYGEN SATURATION: 97 % | BODY MASS INDEX: 29.13 KG/M2 | SYSTOLIC BLOOD PRESSURE: 116 MMHG

## 2020-05-21 PROCEDURE — 99214 OFFICE O/P EST MOD 30 MIN: CPT | Performed by: INTERNAL MEDICINE

## 2020-05-21 SDOH — HEALTH STABILITY: MENTAL HEALTH: HOW OFTEN DO YOU HAVE A DRINK CONTAINING ALCOHOL?: NEVER

## 2020-05-21 NOTE — LETTER
 metoprolol tartrate (LOPRESSOR) 25 MG tablet TAKE 1 TABLET TWICE A DAY, THIRD DOSE IF HEART RATE IS  tablet 1    amLODIPine (NORVASC) 5 MG tablet TAKE 1 TABLET DAILY 90 tablet 4    aspirin 81 MG EC tablet Take 1 tablet by mouth daily 30 tablet 11    clonazePAM (KLONOPIN) 0.5 MG tablet 1 mg 2 times daily       tadalafil (CIALIS) 20 MG tablet Take 1 tablet by mouth as needed for Erectile Dysfunction 3 tablet 0     No current facility-administered medications for this visit.       Reviewed with patient and will remain unchanged except as mentioned in A/P  PHYSICAL EXAM     Vitals:    05/21/20 1008   BP: 116/78   Pulse:    SpO2:       Gen Alert, coop, no distress Heart  Rrr, no mrg   Head NC, AT, no abnorm Abd  Soft, NT, +BS, no mass, no OM   Eyes PER, conj/corn clear Ext  Ext nl, AT, no C/C/E   Nose Nares nl, no drain, NT Pulse 1+ and symmetric   Throat Lips, mucosa, tongue nl Skin Col/text/turg nl, no vis rash/les   Neck S/S, TM, NT, no bruit/JVD Psych Nl mood and affect   Lung CTA-B, unlabored, no DTP Lymph   No cervical or axillary LA   Ch wall NT, no deform Neuro  Nl gross M/S exam     ASSESSMENT AND PLAN     *CAD   Date EF Results   Sx   No concerning   [facility]   [x]I         []II           []III          []IV   Hx   MVPCI   [facility] 10/10  11/16 40% PCI LAD and RCA ([place])  PCI of LAD/kissing balloon diagonal with 1 KARLY (Andrade)   MPI 10/15 71% Small apical infarct, no ischemia   TTE 1/14 55% Apical anterior hk   Plan   Continue aggressive medical treatment at doses above   *HTN  Status Controlled  Plan Counseled on diet/salt/exercise/weight, continue meds at doses above  *CHOL  Status  Uncontrolled with last LDL of 106 (goal <70) and HDL of 21 (3/19)  Plan Counseled on diet/exercise/weight, continue statin, lipid/liver surveillance per PCP  *TOB  Status Continues to smoke  Plan Continued avoidance of 1st and 2nd hand smoke, counseled on risks/cessation  *COMPLIANCE  Status Compliant Plan Discussed importance of compliance with meds/diet/salt/exercise; avoid tob/alc/drugs; patient verbalized understanding  *FOLLOWUP  6 months    [address] Hussain Zee, am scribing for and in the presence of Rhonda Lam MD.   SignedHussain 05/19/20 8:37 AM   Provider Damari Ward is working as a scribe for and in the presence of kiera Lam MD). Working as a scribe, Hussain Garcia may have prepopulated components of this note with my historical  intellectual property under my direct supervision. Any additions to this intellectual property were performed in my presence and at my direction. Furthermore, the content and accuracy of this note have been reviewed by me Rhonda Lam MD).  5/21/2020 7:32 AM          If you have questions, please do not hesitate to call me. I look forward to following Bre Vee along with you.     Sincerely,        Alicia Bartlett MD

## 2020-12-01 NOTE — PROGRESS NOTES
[address] [email or web address] [address]     H+P // CONSULT // OUTPATIENT VISIT // FOLLOWUP VISIT     Referring Doctor Anastasia Aguirre   Encounter Type Followup     CHIEF COMPLAINT     Visit Type Chronic   Symptoms sob   Problems CAD, HTN, CHOL, TOB     HISTORY OF PRESENT ILLNESS      GEN - Doing well. No new concerns. Mild sob with exertion.  CAD - Denies cp, sob, dizziness, syncope, palpitations.  HTN - Ambulatory BP readings in good range. No HA or dizziness.  CHOL - Last cholesterol reviewed and elevated. No statin due to allergy.  TOB -  Continues to smoke 1ppd. Not interested in cessation.  MED - Compliant with CV meds listed below without notable side effects.  DATA - Most recent testing including but not limited to LHC, TTE, EKG reviewed personally and independently interpreted. HISTORY/ALLERGIES/ROS     MedHx:  has a past medical history of Hypertension and MI (mitral incompetence). SurgHx:  has a past surgical history that includes Coronary angioplasty with stent; Appendectomy; and Nasal septum surgery (11/2014). SocHx:  reports that he has been smoking cigarettes. He has been smoking about 0.25 packs per day. He has never used smokeless tobacco. He reports that he does not drink alcohol or use drugs. FamHx: family history includes Diabetes in his father and mother; Heart Disease in his father; High Blood Pressure in his father and mother; High Cholesterol in his father and mother.    Allerg: Fenofibrate and Statins   ROS: [x]Full ROS obtained and negative except as mentioned in HPI     MEDICATIONS      Current Outpatient Medications   Medication Sig Dispense Refill    metoprolol tartrate (LOPRESSOR) 25 MG tablet TAKE 1 TABLET TWICE A DAY, THIRD DOSE IF HEART RATE IS  tablet 3    amLODIPine (NORVASC) 5 MG tablet TAKE 1 TABLET DAILY (Patient taking differently: Take 5 mg by mouth daily as needed ) 90 tablet 4    aspirin 81 MG EC tablet Take 1 tablet by mouth daily 30 tablet 11 Laural [place] 12/01/20 8:56 AM   Provider Eileen Luzmaria is working as a scribe for and in the presence of me Abdiel Small MD). Working as a scribe, Sung Brown may have prepopulated components of this note with my historical  intellectual property under my direct supervision. Any additions to this intellectual property were performed in my presence and at my direction. Furthermore, the content and accuracy of this note have been reviewed by me Abdiel Small MD). 12/3/2020 7:20 AM     CODING     Category Diagnosis   Stable chronic illness  (63437/10440 - 2 or more) CAD  HTN   Chronic illness with: Exac, progr or SA of Tx  (32512/43044 - 1 or more) CHOL - LDL above goal  TOB - continues to smoke   Undiagnosed new problem with: uncertain prognosis  (63622/52749 - 1 or more)    Acute illness with systemic Sx  (34428/20551 - 1 or more)    Acute, complicated injury  (77295/20098 - 1 or more)    87609 1 or more chronic illness with exacerbation, progression or SA of treatment    Time  30-39 minutes spent preparing to see patient including reviewing patient history/prior tests/prior consults, performing a medical exam, counseling and educating patient/family/caregiver, ordering medications/tests/procedures, referring and communicating with PCPs and other pertinent consultants, documenting information in the EMR, independently interpreting results and communicating to family and coordination of patient care.

## 2020-12-03 ENCOUNTER — OFFICE VISIT (OUTPATIENT)
Dept: CARDIOLOGY CLINIC | Age: 50
End: 2020-12-03
Payer: COMMERCIAL

## 2020-12-03 VITALS
HEART RATE: 70 BPM | HEIGHT: 71 IN | SYSTOLIC BLOOD PRESSURE: 138 MMHG | OXYGEN SATURATION: 98 % | DIASTOLIC BLOOD PRESSURE: 70 MMHG | BODY MASS INDEX: 30.8 KG/M2 | WEIGHT: 220 LBS

## 2020-12-03 PROCEDURE — 99214 OFFICE O/P EST MOD 30 MIN: CPT | Performed by: INTERNAL MEDICINE

## 2020-12-03 NOTE — LETTER
[address] [facility] [address] [place] [address] [address]  Phone: [phone]  Fax: [phone]    Jp Rouse MD        December 10, 2020     Jules REYES  [address] [address]    Patient: Estrada Smis  MR Number: [id number]  YOB: 1970  Date of Visit: 12/3/2020    Dear Dr. Jules REYES:      [address] [facility] [address]     H+P // CONSULT // OUTPATIENT VISIT // FOLLOWUP VISIT     Referring Doctor Gray Pitts   Encounter Type Followup     CHIEF COMPLAINT     Visit Type Chronic   Symptoms sob   Problems CAD, HTN, CHOL, TOB     HISTORY OF PRESENT ILLNESS     ? GEN - Doing well. No new concerns. Mild sob with exertion. ? CAD - Denies cp, sob, dizziness, syncope, palpitations. ? HTN - Ambulatory BP readings in good range. No HA or dizziness. ? CHOL - Last cholesterol reviewed and elevated. No statin due to allergy. ? TOB -  Continues to smoke 1ppd. Not interested in cessation. ? MED - Compliant with CV meds listed below without notable side effects. ? DATA - Most recent testing including but not limited to [facility], TTE, EKG reviewed personally and independently interpreted. HISTORY/ALLERGIES/ROS     MedHx:  has a past medical history of Hypertension and MI (mitral incompetence). SurgHx:  has a past surgical history that includes Coronary angioplasty with stent; Appendectomy; and Nasal septum surgery (11/2014). SocHx:  reports that he has been smoking cigarettes. He has been smoking about 0.25 packs per day. He has never used smokeless tobacco. He reports that he does not drink alcohol or use drugs. FamHx: family history includes Diabetes in his father and mother; Heart Disease in his father; High Blood Pressure in his father and mother; High Cholesterol in his father and mother.    Allerg: Fenofibrate and Statins   ROS: [x]Full ROS obtained and negative except as mentioned in HPI MEDICATIONS      Current Outpatient Medications   Medication Sig Dispense Refill    metoprolol tartrate (LOPRESSOR) 25 MG tablet TAKE 1 TABLET TWICE A DAY, THIRD DOSE IF HEART RATE IS  tablet 3    amLODIPine (NORVASC) 5 MG tablet TAKE 1 TABLET DAILY (Patient taking differently: Take 5 mg by mouth daily as needed ) 90 tablet 4    aspirin 81 MG EC tablet Take 1 tablet by mouth daily 30 tablet 11    clonazePAM (KLONOPIN) 0.5 MG tablet 0.5 mg 2 times daily. No current facility-administered medications for this visit.       Reviewed with patient and will remain unchanged except as mentioned in A/P  PHYSICAL EXAM     Vitals:    12/03/20 0901   BP: 138/70   Pulse: 70   SpO2: 98%      Gen Alert, coop, no distress Heart  Rrr, no mrg   Head NC, AT, no abnorm Abd  Soft, NT, +BS, no mass, no OM   Eyes PER, conj/corn clear Ext  Ext nl, AT, no C/C/E   Nose Nares nl, no drain, NT Pulse 2+ and symmetric   Throat Lips, mucosa, tongue nl Skin Col/text/turg nl, no vis rash/les   Neck S/S, TM, NT, no bruit/JVD Psych Nl mood and affect   Lung CTA-B, unlabored, no DTP Lymph   No cervical or axillary LA   Ch wall NT, no deform Neuro  Nl gross M/S exam     ASSESSMENT AND PLAN     *CAD   Date EF Results   Sx   Mild sob with exertion   Hx   MVPCI   [facility] 10/10  11/16 40% PCI LAD and RCA ([facility] [place])  PCI of LAD/kissing balloon diagonal with 1 KARLY Idania Hodges)   MPI 10/15 71% Small apical infarct, no ischemia   TTE 1/14 55% Apical anterior hk   Plan   Continue aggressive medical treatment at doses above  Stress echo with sob and hx cad   *HTN  Status Controlled, vitals and available ambulatory monitoring logs reviewed personally  Plan Counseled on diet/salt/exercise/weight, continue meds at doses above  *CHOL  Status  uncontrolled with last LDL of 148 (goal <70) and HDL of 25 (5/20), labs reviewed personally  Plan Counseled on diet/exercise/weight, no statin due to allergy, lipid/liver surveillance per PCP, refuses injectables  *TOB Status Continues to smoke  Plan Continued avoidance of 1st and 2nd hand smoke, counseled on risks/cessation  *COMPLIANCE  Status Compliant  Plan Discussed importance of compliance with meds/diet/salt/exercise; avoid tob/alc/drugs; patient verbalized understanding  *FOLLOWUP  6 months    [facility] [place] Arthur Zee, am scribing for and in the presence of Joe Warner MD.   SignedArthur 12/01/20 8:56 AM   Provider Kathrin Burciaga is working as a scribe for and in the presence of me (Joe Warner MD). Working as a scribe, Arthur Hou may have prepopulated components of this note with my historical  intellectual property under my direct supervision. Any additions to this intellectual property were performed in my presence and at my direction. Furthermore, the content and accuracy of this note have been reviewed by me Joe Warner MD). 12/3/2020 7:20 AM     CODING     Category Diagnosis   Stable chronic illness  (46118/96708 - 2 or more) CAD  HTN   Chronic illness with: Exac, progr or SA of Tx  (19388/93149 - 1 or more) CHOL - LDL above goal  TOB - continues to smoke   Undiagnosed new problem with: uncertain prognosis  (98381/14628 - 1 or more)    Acute illness with systemic Sx  (56514/26284 - 1 or more)    Acute, complicated injury  (55840/62495 - 1 or more)    41156 1 or more chronic illness with exacerbation, progression or SA of treatment    Time  30-39 minutes spent preparing to see patient including reviewing patient history/prior tests/prior consults, performing a medical exam, counseling and educating patient/family/caregiver, ordering medications/tests/procedures, referring and communicating with PCPs and other pertinent consultants, documenting information in the EMR, independently interpreting results and communicating to family and coordination of patient care. If you have questions, please do not hesitate to call me.  I look forward to following Nhi Horne along with you.     Sincerely,        Chad Frey MD

## 2020-12-31 RX ORDER — AMLODIPINE BESYLATE 5 MG/1
TABLET ORAL
Qty: 90 TABLET | Refills: 3 | Status: SHIPPED | OUTPATIENT
Start: 2020-12-31 | End: 2021-12-02 | Stop reason: SDUPTHER

## 2021-02-16 NOTE — TELEPHONE ENCOUNTER
Received refill request for Metoprolol from [address].      Last OV: 12/03/2020 w/ DCE    Last Refill: 06/22/2020 #180 w/ 3 refills     Next Appointment: 06/04/2021 w/ DCE

## 2021-06-02 NOTE — PROGRESS NOTES
[address]     H+P // CONSULT // OUTPATIENT VISIT // FOLLOWUP VISIT     Referring Doctor Radha Perez   Encounter Type Followup     CHIEF COMPLAINT     Visit Type Chronic   Symptoms No concerning   Problems CAD, HTN, CHOL, TOB     HISTORY OF PRESENT ILLNESS      GEN - Doing well. No new concerns.  CAD - Denies cp, sob, dizziness, syncope, palpitations.  HTN - Ambulatory BP readings in good range. No HA or dizziness.  CHOL - Last cholesterol reviewed and elevated. No statin due to allergy.  TOB -  Continues to smoke 1ppd. Trying to cut down.  MED - Compliant with CV meds listed below without notable side effects.  DATA - Most recent testing including but not limited to LHC, TTE, EKG reviewed personally and independently interpreted. HISTORY/ALLERGIES/ROS     MedHx:  has a past medical history of Hypertension and MI (mitral incompetence). SurgHx:  has a past surgical history that includes Coronary angioplasty with stent; Appendectomy; and Nasal septum surgery (11/2014). SocHx:  reports that he has been smoking cigarettes. He has been smoking about 0.25 packs per day. He has never used smokeless tobacco. He reports that he does not drink alcohol and does not use drugs. FamHx: family history includes Diabetes in his father and mother; Heart Disease in his father; High Blood Pressure in his father and mother; High Cholesterol in his father and mother. Allerg: Fenofibrate and Statins   ROS: [x]Full ROS obtained and negative except as mentioned in HPI     MEDICATIONS      Current Outpatient Medications   Medication Sig Dispense Refill    metoprolol tartrate (LOPRESSOR) 25 MG tablet TAKE 1 TABLET TWICE A DAY, THIRD DOSE IF HEART RATE IS  tablet 3    amLODIPine (NORVASC) 5 MG tablet TAKE 1 TABLET DAILY 90 tablet 3    aspirin 81 MG EC tablet Take 1 tablet by mouth daily 30 tablet 11    clonazePAM (KLONOPIN) 0.5 MG tablet 0.5 mg 2 times daily.         No current Delia Carolina MD). Working as a scribe, Shay Ray may have prepopulated components of this note with my historical  intellectual property under my direct supervision. Any additions to this intellectual property were performed in my presence and at my direction. Furthermore, the content and accuracy of this note have been reviewed by me Delia Carolina MD).  6/4/2021 8:20 AM    CODING     Category Diagnosis   Stable chronic illness  (79057/90794 - 2 or more) CAD, HTN, CHOL, TOB   Chronic illness with: Exac, progr or SA of Tx  (09495/40774 - 1 or more)    Undiagnosed new problem with: uncertain prognosis  (35795/89405 - 1 or more)    Acute illness with systemic Sx  (11961/00446 - 1 or more)    Acute, complicated injury  (14047/61772 - 1 or more)    56372 1 or more chronic illness with exacerbation, progression or SA of treatment    Time  30-39 minutes spent preparing to see patient including reviewing patient history/prior tests/prior consults, performing a medical exam, counseling and educating patient/family/caregiver, ordering medications/tests/procedures, referring and communicating with PCPs and other pertinent consultants, documenting information in the EMR, independently interpreting results and communicating to family and coordination of patient care.

## 2021-06-04 ENCOUNTER — OFFICE VISIT (OUTPATIENT)
Dept: CARDIOLOGY CLINIC | Age: 51
End: 2021-06-04
Payer: COMMERCIAL

## 2021-06-04 VITALS
DIASTOLIC BLOOD PRESSURE: 74 MMHG | HEIGHT: 71 IN | SYSTOLIC BLOOD PRESSURE: 122 MMHG | WEIGHT: 212 LBS | OXYGEN SATURATION: 97 % | HEART RATE: 70 BPM | BODY MASS INDEX: 29.68 KG/M2

## 2021-06-04 DIAGNOSIS — E78.00 HYPERCHOLESTEREMIA: ICD-10-CM

## 2021-06-04 DIAGNOSIS — I25.10 CORONARY ARTERY DISEASE DUE TO LIPID RICH PLAQUE: Primary | ICD-10-CM

## 2021-06-04 DIAGNOSIS — I25.83 CORONARY ARTERY DISEASE DUE TO LIPID RICH PLAQUE: Primary | ICD-10-CM

## 2021-06-04 DIAGNOSIS — Z72.0 TOBACCO ABUSE: ICD-10-CM

## 2021-06-04 DIAGNOSIS — I10 ESSENTIAL HYPERTENSION: ICD-10-CM

## 2021-06-04 PROCEDURE — 99214 OFFICE O/P EST MOD 30 MIN: CPT | Performed by: INTERNAL MEDICINE

## 2021-06-04 NOTE — LETTER
Allerg: Fenofibrate and Statins   ROS: [x]Full ROS obtained and negative except as mentioned in HPI     MEDICATIONS      Current Outpatient Medications   Medication Sig Dispense Refill    metoprolol tartrate (LOPRESSOR) 25 MG tablet TAKE 1 TABLET TWICE A DAY, THIRD DOSE IF HEART RATE IS  tablet 3    amLODIPine (NORVASC) 5 MG tablet TAKE 1 TABLET DAILY 90 tablet 3    aspirin 81 MG EC tablet Take 1 tablet by mouth daily 30 tablet 11    clonazePAM (KLONOPIN) 0.5 MG tablet 0.5 mg 2 times daily. No current facility-administered medications for this visit.      Reviewed with patient and will remain unchanged except as mentioned in A/P  PHYSICAL EXAM     Vitals:    06/04/21 0819   BP: 122/74   Pulse: 70   SpO2: 97%      Gen Alert, coop, no distress Heart  Rrr, no mrg   Head NC, AT, no abnorm Abd  Soft, NT, +BS, no mass, no OM   Eyes PER, conj/corn clear Ext  Ext nl, AT, no C/C/E   Nose Nares nl, no drain, NT Pulse 2+ and symmetric   Throat Lips, mucosa, tongue nl Skin Col/text/turg nl, no vis rash/les   Neck S/S, TM, NT, no bruit/JVD Psych Nl mood and affect   Lung CTA-B, unlabored, no DTP Lymph   No cervical or axillary LA   Ch wall NT, no deform Neuro  Nl gross M/S exam     ASSESSMENT AND PLAN     *CAD   Date EF Results   Sx   Mild sob with exertion   Hx   MVPCI   [facility] 10/10  11/16 40% PCI LAD and RCA ([facility] [place])  PCI of LAD/kissing balloon diagonal with 1 KARLY Bhupinder Ramirezan)   MPI 10/15 71% Small apical infarct, no ischemia   TTE 1/14 55% Apical anterior hk   Plan   Continue aggressive medical treatment at doses above   *HTN  Status Controlled, vitals and available ambulatory monitoring logs reviewed personally  Plan Counseled on diet/salt/exercise/weight, continue meds at doses above  *CHOL  Status  uncontrolled with last LDL of 148 (goal <70) and HDL of 25 (5/20), labs reviewed personally  Plan Counseled on diet/exercise/weight, no statin due to allergy, lipid/liver surveillance per PCP, refuses injectables *TOB  Status Continues to smoke  Plan Continued avoidance of 1st and 2nd hand smoke, counseled on risks/cessation  *COMPLIANCE  Status Compliant  Plan Discussed importance of compliance with meds/diet/salt/exercise; avoid tob/alc/drugs; patient verbalized understanding  *FOLLOWUP  6 months    [address] Kera Zee, am scribing for and in the presence of Dennys Rosario MD.   SignedKera 06/02/21 3:02 PM   Provider Solomon Kim is working as a scribe for and in the presence of me (Dennys Rosario MD). Working as a scribe, Kera Mcneil may have prepopulated components of this note with my historical  intellectual property under my direct supervision. Any additions to this intellectual property were performed in my presence and at my direction. Furthermore, the content and accuracy of this note have been reviewed by me Dennys Rosario MD).  6/4/2021 8:20 AM    CODING     Category Diagnosis   Stable chronic illness  (97980/86087 - 2 or more) CAD, HTN, CHOL, TOB   Chronic illness with: Exac, progr or SA of Tx  (75116/98265 - 1 or more)    Undiagnosed new problem with: uncertain prognosis  (58520/70965 - 1 or more)    Acute illness with systemic Sx  (26076/46065 - 1 or more)    Acute, complicated injury  (80045/45800 - 1 or more)    70947 1 or more chronic illness with exacerbation, progression or SA of treatment    Time  30-39 minutes spent preparing to see patient including reviewing patient history/prior tests/prior consults, performing a medical exam, counseling and educating patient/family/caregiver, ordering medications/tests/procedures, referring and communicating with PCPs and other pertinent consultants, documenting information in the EMR, independently interpreting results and communicating to family and coordination of patient care. If you have questions, please do not hesitate to call me. I look forward to following Va Leigh along with you.     Sincerely, Lindsey Guthrie MD

## 2021-09-28 NOTE — TELEPHONE ENCOUNTER
Received refill request for metoprolol from [facility].     Last ov:6/4/2021 DCE    Last Refill: 2/16/2021 #180 with 3 refills    Next appointment:12/2/2021 NPTS

## 2021-12-02 ENCOUNTER — OFFICE VISIT (OUTPATIENT)
Dept: CARDIOLOGY CLINIC | Age: 51
End: 2021-12-02
Payer: COMMERCIAL

## 2021-12-02 VITALS
OXYGEN SATURATION: 96 % | HEART RATE: 81 BPM | SYSTOLIC BLOOD PRESSURE: 138 MMHG | DIASTOLIC BLOOD PRESSURE: 84 MMHG | WEIGHT: 217.5 LBS | BODY MASS INDEX: 28.83 KG/M2 | HEIGHT: 73 IN

## 2021-12-02 DIAGNOSIS — I10 ESSENTIAL HYPERTENSION: ICD-10-CM

## 2021-12-02 DIAGNOSIS — I25.83 CORONARY ARTERY DISEASE DUE TO LIPID RICH PLAQUE: Primary | ICD-10-CM

## 2021-12-02 DIAGNOSIS — I25.10 CORONARY ARTERY DISEASE DUE TO LIPID RICH PLAQUE: Primary | ICD-10-CM

## 2021-12-02 DIAGNOSIS — E78.2 MIXED HYPERLIPIDEMIA: ICD-10-CM

## 2021-12-02 PROCEDURE — 99214 OFFICE O/P EST MOD 30 MIN: CPT | Performed by: NURSE PRACTITIONER

## 2021-12-02 RX ORDER — AMLODIPINE BESYLATE 5 MG/1
TABLET ORAL
Qty: 90 TABLET | Refills: 1 | Status: SHIPPED | OUTPATIENT
Start: 2021-12-02 | End: 2022-07-14

## 2021-12-02 NOTE — PROGRESS NOTES
[facility]     Outpatient Follow Up Note    Virgen Null is 49 y.o. male who presents today with a history of CAD PTCA Sept LAD & staged Oct RCA '10, s/p PTCA mid-LAD and diag-1 Oct '16, apical anterior HK, HTN and hyperlipidemia     CHIEF COMPLAINT / HPI:  Follow Up secondary to coronary artery disease    Subjective:   He denies significant chest pain. His chest muscles hurt attributed to working out. There is no SOB/RESENDEZ. The patient denies orthopnea/PND. The patient does not have swelling. The patients weight is stable . The patient is not experiencing dizziness. A few weeks ago, he was sitting and his heart beat was skipping. Prior to that, he'd feel a missed beat every once in awhile. His BP drops low when he stops smoking therefore he only takes amlodipine if his BP is > 130/90. He's been taking it twice a day. He's been getting HAs at the base of his head. He has had neck surgery and thinking it may be related. These symptoms show no change since the last OV. With regard to medication therapy the patient has been compliant with prescribed regimen. They have tolerated therapy to date. Past Medical History:   Diagnosis Date    Hypertension     MI (mitral incompetence)      Social History:    Social History     Tobacco Use   Smoking Status Current Every Day Smoker    Packs/day: 0.25    Types: Cigarettes   Smokeless Tobacco Never Used   Tobacco Comment    1 ppd     Current Medications:  Current Outpatient Medications   Medication Sig Dispense Refill    metoprolol tartrate (LOPRESSOR) 25 MG tablet TAKE 1 TABLET TWICE A DAY, THIRD DOSE IF HEART RATE IS  tablet 5    amLODIPine (NORVASC) 5 MG tablet TAKE 1 TABLET DAILY (Patient taking differently: Only if >130/90) 90 tablet 3    aspirin 81 MG EC tablet Take 1 tablet by mouth daily 30 tablet 11    clonazePAM (KLONOPIN) 0.5 MG tablet 0.5 mg 2 times daily.         No current facility-administered medications for this visit.     REVIEW OF SYSTEMS:    CONSTITUTIONAL: No major weight gain or loss, fatigue, weakness, night sweats or fever. HEENT: No new vision difficulties or ringing in the ears. RESPIRATORY: No new SOB, PND, orthopnea or cough. CARDIOVASCULAR: See HPI  GI: No nausea, vomiting, diarrhea, constipation, abdominal pain or changes in bowel habits. : No urinary frequency, urgency, incontinence hematuria or dysuria. SKIN: No cyanosis or skin lesions. MUSCULOSKELETAL: No new muscle or joint pain. NEUROLOGICAL: No syncope or TIA-like symptoms. PSYCHIATRIC: No anxiety, pain, insomnia or depression    Objective:   PHYSICAL EXAM:    Vitals:    12/02/21 0906 12/02/21 0907 12/02/21 0924   BP: (!) 144/100 (!) 128/94 138/84   Site: Left Upper Arm Left Upper Arm Right Upper Arm   Position: Sitting Sitting    Cuff Size: Medium Adult Medium Adult    Pulse: 81     SpO2: 96%     Weight: 217 lb 8 oz (98.7 kg)     Height: 6' 1\" (1.854 m)         VITALS:  /84 (Site: Right Upper Arm)   Pulse 81   Ht 6' 1\" (1.854 m)   Wt 217 lb 8 oz (98.7 kg)   SpO2 96%   BMI 28.70 kg/m²   CONSTITUTIONAL: Cooperative, no apparent distress, and appears well nourished / developed  NEUROLOGIC:  Awake and orientated to person, place and time. PSYCH: Calm affect. SKIN: Warm and dry. HEENT: Sclera non-icteric, normocephalic, neck supple, no elevation of JVP, normal carotid pulses with no bruits and thyroid normal size. LUNGS:  No increased work of breathing and clear to auscultation, no crackles or wheezing  CARDIOVASCULAR:  Regular rate 68 and rhythm with no murmurs, gallops, rubs, or abnormal heart sounds, normal PMI. The apical impulses not displaced  JVP less than 8 cm H2O  Heart tones are crisp and normal  Cervical veins are not engorged  The carotid upstroke is normal in amplitude and contour without delay or bruit  JVP is not elevated  ABDOMEN:  Normal bowel sounds, non-distended and non-tender to palpation  EXT: No edema, no calf tenderness. Pulses are present bilaterally. DATA:    No results found for: ALT, AST, GGT, ALKPHOS, BILITOT  Lab Results   Component Value Date    CREATININE 0.9 11/01/2016    BUN 13 11/01/2016     11/01/2016    K 4.0 11/01/2016     11/01/2016    CO2 26 11/01/2016     No results found for: TSH, X5EDUQL, K7ALCUO, THYROIDAB  Lab Results   Component Value Date    WBC 6.0 11/01/2016    HGB 14.9 11/01/2016    HCT 43.5 11/01/2016    MCV 89.5 11/01/2016     11/01/2016     No components found for: CHLPL  Lab Results   Component Value Date    TRIG 256 (H) 11/01/2016    TRIG 1,026 (H) 06/23/2012     Lab Results   Component Value Date    HDL 22 (L) 11/01/2016    HDL 25 (L) 06/23/2012     Lab Results   Component Value Date    LDLCALC 107 (H) 11/01/2016     Lab Results   Component Value Date    LABVLDL 51 11/01/2016     LABS: 5/7/20:     trig 353 HDL 25    A1c 5.5%     8/29/21:    Na+ 137 K+ 5.1 chl 101 CO2 22 BUN 16 creatinine 1.2 glu 83    Radiology Review:  Pertinent images / reports were reviewed as a part of this visit and reveals the following:    Last Angiogram: 10/31/16:  ~PTCA mid-LAD 90% distal to a previously placed stent > 0  ~PTCA diag-1 80% > 0  ~prox-RCA stent patent  ~prox-LAD stent patent  ~EF 55%     Oct '15: myoview stress:   LV function is normal with apical hypokinesis and ejection fraction of 71 %.    Small sized apical fixed defect consistent with infarction in the territory    of the distal LAD .    There is no evidence of stress induced ischemia.    Good exercise tolerance.      Sept '15: KATHY:  Lt.: 0.86 Rt.: 0.84      Last Angiogram: Oct '10:  Diagnostic angiogram  confirmed the proximal eccentric 70-80% stenosis. A BMW wire was then placed and a 3.5 x 12  drug eluting [place] stent was primarily deployed to high pressure.  This gave an excellent  result with no residual stenosis and normal flow downstream. There was some spasm distal to  the stent that was relieved with NTG. IMPRESSION: Successful drug eluting stent to the proximal RCA for multivessel coronary artery disease. Assessment:      Diagnosis Orders   1. Coronary artery disease due to lipid rich plaque   ~stable : denies angina  ~CCB / BB / ASA  Hx : PTCA Sept LAD & staged Oct RCA '10  Hx : s/p PTCA mid-LAD and diag-1 Oct '16    2. Essential hypertension   ~elevated - suboptimal on arrival ; improved-controlled with recheck  ~has been taking norvasc bid at times depending on BP. Using parameters to hold < 130/90    3. Mixed hyperlipidemia   ~not controlled on diet alone  ~refuses statins      I had the opportunity to review the clinical symptoms and presentation of Dottie Lee Sr. .   Plan:     1. Continue amlodipine 5 mg daily as routine : consistent BP control  2. F/U in 4-6 months    Agreeable to try Red Yeast Rice OTC to help with lipid management     Overall the patient is stable from CV standpoint    I have addresed the patient's cardiac risk factors and adjusted pharmacologic treatment as needed. In addition, I have reinforced the need for patient directed risk factor modification. Further evaluation will be based upon the patient's clinical course and testing results. All questions and concerns were addressed to the patient. Alternatives to my treatment were discussed. The patient is currently smoking. The risks related to smoking were reviewed with the patient. Recommend maintaining a smoke-free lifestyle. Patient is on a beta-blocker  Patient is not on an ace-i/ARB : neg CHF  Patient is not on a statin : intolerant and refused [facility]-9    Antiplatelet therapy has been recommended / prescribed for this patient. Education conducted on adverse reactions including bleeding was discussed. The patient verbalizes understanding not to stop medications without discussing with us. Discussed exercise: 30-60 minutes 7 days/week  Discussed Low saturated fat diet.      Thank you for allowing to us to participate in the care of Gertrudis Gibbs. AZALIA Garcia    Documentation of today's visit sent to PCP

## 2022-07-14 ENCOUNTER — HOSPITAL ENCOUNTER (EMERGENCY)
Age: 52
Discharge: HOME | End: 2022-07-15
Payer: COMMERCIAL

## 2022-07-14 ENCOUNTER — APPOINTMENT (OUTPATIENT)
Dept: GENERAL RADIOLOGY | Age: 52
End: 2022-07-14
Payer: COMMERCIAL

## 2022-07-14 VITALS
SYSTOLIC BLOOD PRESSURE: 156 MMHG | BODY MASS INDEX: 27.04 KG/M2 | HEART RATE: 99 BPM | WEIGHT: 204 LBS | OXYGEN SATURATION: 97 % | HEIGHT: 73 IN | TEMPERATURE: 98.3 F | RESPIRATION RATE: 16 BRPM | DIASTOLIC BLOOD PRESSURE: 107 MMHG

## 2022-07-14 DIAGNOSIS — R07.9 CHEST PAIN, UNSPECIFIED TYPE: Primary | ICD-10-CM

## 2022-07-14 LAB
A/G RATIO: 1.5 (ref 1.1–2.2)
ALBUMIN SERPL-MCNC: 4.8 G/DL (ref 3.4–5)
ALP BLD-CCNC: 127 U/L (ref 40–129)
ALT SERPL-CCNC: 30 U/L (ref 10–40)
ANION GAP SERPL CALCULATED.3IONS-SCNC: 12 MMOL/L (ref 3–16)
AST SERPL-CCNC: 19 U/L (ref 15–37)
BASOPHILS ABSOLUTE: 0.1 K/UL (ref 0–0.2)
BASOPHILS RELATIVE PERCENT: 1 %
BILIRUB SERPL-MCNC: 0.7 MG/DL (ref 0–1)
BUN BLDV-MCNC: 14 MG/DL (ref 7–20)
CALCIUM SERPL-MCNC: 9.8 MG/DL (ref 8.3–10.6)
CHLORIDE BLD-SCNC: 104 MMOL/L (ref 99–110)
CO2: 24 MMOL/L (ref 21–32)
CREAT SERPL-MCNC: 0.9 MG/DL (ref 0.9–1.3)
EOSINOPHILS ABSOLUTE: 0.2 K/UL (ref 0–0.6)
EOSINOPHILS RELATIVE PERCENT: 2.9 %
GFR AFRICAN AMERICAN: >60
GFR NON-AFRICAN AMERICAN: >60
GLUCOSE BLD-MCNC: 103 MG/DL (ref 70–99)
HCT VFR BLD CALC: 44.5 % (ref 40.5–52.5)
HEMOGLOBIN: 15.6 G/DL (ref 13.5–17.5)
LIPASE: 45 U/L (ref 13–60)
LYMPHOCYTES ABSOLUTE: 2 K/UL (ref 1–5.1)
LYMPHOCYTES RELATIVE PERCENT: 27.7 %
MCH RBC QN AUTO: 31.6 PG (ref 26–34)
MCHC RBC AUTO-ENTMCNC: 35 G/DL (ref 31–36)
MCV RBC AUTO: 90.3 FL (ref 80–100)
MONOCYTES ABSOLUTE: 0.7 K/UL (ref 0–1.3)
MONOCYTES RELATIVE PERCENT: 9 %
NEUTROPHILS ABSOLUTE: 4.3 K/UL (ref 1.7–7.7)
NEUTROPHILS RELATIVE PERCENT: 59.4 %
PDW BLD-RTO: 13.2 % (ref 12.4–15.4)
PLATELET # BLD: 223 K/UL (ref 135–450)
PMV BLD AUTO: 7.6 FL (ref 5–10.5)
POTASSIUM SERPL-SCNC: 4 MMOL/L (ref 3.5–5.1)
RBC # BLD: 4.92 M/UL (ref 4.2–5.9)
SODIUM BLD-SCNC: 140 MMOL/L (ref 136–145)
TOTAL PROTEIN: 7.9 G/DL (ref 6.4–8.2)
TROPONIN: <0.01 NG/ML
WBC # BLD: 7.2 K/UL (ref 4–11)

## 2022-07-14 PROCEDURE — 84484 ASSAY OF TROPONIN QUANT: CPT

## 2022-07-14 PROCEDURE — 36415 COLL VENOUS BLD VENIPUNCTURE: CPT

## 2022-07-14 PROCEDURE — 80053 COMPREHEN METABOLIC PANEL: CPT

## 2022-07-14 PROCEDURE — 83690 ASSAY OF LIPASE: CPT

## 2022-07-14 PROCEDURE — 93005 ELECTROCARDIOGRAM TRACING: CPT | Performed by: EMERGENCY MEDICINE

## 2022-07-14 PROCEDURE — 99285 EMERGENCY DEPT VISIT HI MDM: CPT

## 2022-07-14 PROCEDURE — 71045 X-RAY EXAM CHEST 1 VIEW: CPT

## 2022-07-14 PROCEDURE — 85025 COMPLETE CBC W/AUTO DIFF WBC: CPT

## 2022-07-14 RX ORDER — CLONAZEPAM 0.5 MG/1
0.5 TABLET ORAL 2 TIMES DAILY PRN
COMMUNITY

## 2022-07-14 RX ORDER — PANTOPRAZOLE SODIUM 40 MG/1
40 TABLET, DELAYED RELEASE ORAL DAILY
COMMUNITY
Start: 2022-02-08

## 2022-07-14 RX ORDER — ASPIRIN 325 MG
325 TABLET ORAL DAILY
COMMUNITY
End: 2022-07-28 | Stop reason: ALTCHOICE

## 2022-07-14 RX ORDER — ASPIRIN 325 MG
325 TABLET ORAL ONCE
Status: COMPLETED | OUTPATIENT
Start: 2022-07-14 | End: 2022-07-15

## 2022-07-14 RX ORDER — METOPROLOL SUCCINATE 25 MG/1
25 TABLET, EXTENDED RELEASE ORAL DAILY
COMMUNITY
End: 2022-07-29

## 2022-07-14 ASSESSMENT — PAIN - FUNCTIONAL ASSESSMENT: PAIN_FUNCTIONAL_ASSESSMENT: NONE - DENIES PAIN

## 2022-07-14 ASSESSMENT — HEART SCORE: ECG: 0

## 2022-07-14 NOTE — Clinical Note
The patient has decided to leave against medical advice, because he wants to follow up with his cardiologist outpatient. He has normal mental status and adequate capacity to make medical decisions. The patient refuses hospital admission and wan ts to be discharged. The risks have been explained to the patient, including worsening illness, chronic pain, permanent disability, and death. The benefits of admission have also been explained, including the availability and proximity of nurse s, physicians, monitoring, diagnostic testing, and treatment. The patient was able to understand and state the risks and benefits of hospital admission. This was witnessed by nurse Michelle Madrid and me. He had the opportunity to ask questions about his  medical condition. The patient was treated to the extent that he would allow, and knows that he may return for care at any time. Follow up has been discussed and arranged with Dr. Jass Hernández.

## 2022-07-15 ENCOUNTER — TELEPHONE (OUTPATIENT)
Dept: CARDIOLOGY CLINIC | Age: 52
End: 2022-07-15

## 2022-07-15 LAB
EKG ATRIAL RATE: 100 BPM
EKG DIAGNOSIS: NORMAL
EKG P AXIS: 13 DEGREES
EKG P-R INTERVAL: 166 MS
EKG Q-T INTERVAL: 360 MS
EKG QRS DURATION: 96 MS
EKG QTC CALCULATION (BAZETT): 464 MS
EKG R AXIS: 15 DEGREES
EKG T AXIS: -5 DEGREES
EKG VENTRICULAR RATE: 100 BPM

## 2022-07-15 PROCEDURE — 93010 ELECTROCARDIOGRAM REPORT: CPT | Performed by: INTERNAL MEDICINE

## 2022-07-15 PROCEDURE — 6370000000 HC RX 637 (ALT 250 FOR IP): Performed by: PHYSICIAN ASSISTANT

## 2022-07-15 RX ADMIN — ASPIRIN 325 MG ORAL TABLET 325 MG: 325 PILL ORAL at 00:01

## 2022-07-15 NOTE — ED PROVIDER NOTES
[address] ENCOUNTER        Pt Name: Kingston Babinski Sr. MRN: [id number]  Birthdate 1970  Date of evaluation: 7/14/2022  Provider: Hoang Hickman PA-C  PCP: Zuleika REYES  Note Started: 9:13 PM EDT       RAMEZ. I have evaluated this patient. My supervising physician was available for consultation. CHIEF COMPLAINT       Chief Complaint   Patient presents with    Chest Pain     intermittent left side chest pain x 1 month. Pt states \"chest pain increases with exertion. \" Denies any pain currently, however states chest pain is more of a burning sensation with lifting or strenuous activity. . Pt of Dr Carla Waggoner, hx of cardiac stents. HISTORY OF PRESENT ILLNESS   (Location, Timing/Onset, Context/Setting, Quality, Duration, Modifying Factors, Severity, Associated Signs and Symptoms)  Note limiting factors. Chief Complaint: chest pain    Brianda Montoya is a 47 y.o. male presenting for evaluation of chest pain onset 1 month ago. Pt reports he had left sided burning left sided chest pain. Worse with exertion and with lifting. Not associated with eating. No other known aggravating or alleviating factors. No abd pain, n/v/d. No shortness of breath. Does have history of CAD with stent placement. Denies any current symptoms. Nursing Notes were all reviewed and agreed with or any disagreements were addressed in the HPI. REVIEW OF SYSTEMS    (2-9 systems for level 4, 10 or more for level 5)     Review of Systems   Constitutional:  Negative for appetite change, chills and fever. HENT:  Negative for congestion and rhinorrhea. Respiratory:  Negative for cough, shortness of breath and wheezing. Cardiovascular:  Positive for chest pain. Gastrointestinal:  Negative for abdominal pain, diarrhea, nausea and vomiting. Genitourinary:  Negative for difficulty urinating, dysuria and hematuria.    Musculoskeletal:  Negative for neck pain and neck stiffness. Skin:  Negative for rash. Neurological:  Negative for headaches. Positives and Pertinent negatives as per HPI. Except as noted above in the ROS, all other systems were reviewed and negative. PAST MEDICAL HISTORY     Past Medical History:   Diagnosis Date    Hypertension     MI (mitral incompetence)          SURGICAL HISTORY     Past Surgical History:   Procedure Laterality Date    APPENDECTOMY      CORONARY ANGIOPLASTY WITH STENT PLACEMENT      LASIK  11/15/2020    NASAL SEPTUM SURGERY  11/2014         CURRENTMEDICATIONS       Previous Medications    ACETYLCYSTEINE (NAC PO)    Take by mouth    ASPIRIN 325 MG TABLET    Take 325 mg by mouth daily    ASPIRIN 81 MG EC TABLET    Take 1 tablet by mouth daily    CLONAZEPAM (KLONOPIN) 0.5 MG TABLET    0.5 mg 2 times daily. CLONAZEPAM (KLONOPIN) 0.5 MG TABLET    Take 0.5 mg by mouth 2 times daily as needed.     METOPROLOL SUCCINATE (TOPROL XL) 25 MG EXTENDED RELEASE TABLET    Take 25 mg by mouth daily    METOPROLOL TARTRATE (LOPRESSOR) 25 MG TABLET    TAKE 1 TABLET TWICE A DAY, THIRD DOSE IF HEART RATE IS UP    METOPROLOL TARTRATE (LOPRESSOR) 25 MG TABLET    Take 25 mg by mouth 2 times daily    PANTOPRAZOLE (PROTONIX) 40 MG TABLET    Take 40 mg by mouth daily         ALLERGIES     Fenofibrate and Statins    FAMILYHISTORY       Family History   Problem Relation Age of Onset    High Blood Pressure Mother     High Cholesterol Mother     Diabetes Mother     Heart Disease Father     Diabetes Father     High Blood Pressure Father     High Cholesterol Father           SOCIAL HISTORY       Social History     Tobacco Use    Smoking status: Current Every Day Smoker     Packs/day: 0.25     Types: Cigarettes    Smokeless tobacco: Never Used    Tobacco comment: 1 ppd   Vaping Use    Vaping Use: Never used   Substance Use Topics    Alcohol use: No    Drug use: No     Comment: quit 3-4 months ago       SCREENINGS      Heart Score for chest pain displayed. All other labs were within normal range or not returned as of this dictation. EKG: When ordered, EKG's are interpreted by the Emergency Department Physician in the absence of a cardiologist.  Please see their note for interpretation of EKG. RADIOLOGY:   Non-plain film images such as CT, Ultrasound and MRI are read by the radiologist. Plain radiographic images are visualized and preliminarily interpreted by the ED Provider with the below findings:        Interpretation per the Radiologist below, if available at the time of this note:    XR CHEST PORTABLE   Final Result   No acute process. No results found. PROCEDURES   Unless otherwise noted below, none     Procedures    CRITICAL CARE TIME       CONSULTS:  None      EMERGENCY DEPARTMENT COURSE and DIFFERENTIAL DIAGNOSIS/MDM:   Vitals:    Vitals:    07/14/22 2056   BP: (!) 156/107   Pulse: 99   Resp: 16   Temp: 98.3 °F (36.8 °C)   TempSrc: Oral   SpO2: 97%   Weight: 204 lb (92.5 kg)   Height: 6' 1\" (1.854 m)       Patient was given the following medications:  Medications   aspirin tablet 325 mg (has no administration in time range)         Is this patient to be included in the SEP-1 Core Measure due to severe sepsis or septic shock? No   Exclusion criteria - the patient is NOT to be included for SEP-1 Core Measure due to: Infection is not suspected    Patient presents for evaluation of chest pain. On exam, he is resting comfortably in bed no acute distress nontoxic. He slightly hypertensive but vitals otherwise stable and he is afebrile. Lungs are clear to auscultation bilaterally, chest is nontender and abdomen is benign. Patient was given full-strength aspirin and will be reevaluated. Please see attending note for EKG interpretation. CBC and CMP are unremarkable. Lipase 45. Troponin is negative. Chest x-ray shows no acute process. Patient's heart score is 3-4.   I did recommend admission, but, patient states that he would rather go home as he is asymptomatic and wants to follow-up with his cardiologist, Dr. Emigdio Fong. He will sign out [address] The patient has decided to leave against medical advice. The patient is awake and alert, non-intoxicated, non-suicidal, nonpsychotic. There is no medical condition that prevents or inhibits their understanding of the risks/benefits of their decision. I discussed the nature and purpose, risks and benefits, as well as, the alternatives of admission with Alexandra Rivera. Deonna Kaplan Sr. was given the time and opportunity to ask questions and consider their options, and after the discussion, Noelle Hernandez Sr. decided to refuse. I informed Noelle Hernandez Sr. that refusal could lead to, but was not limited to, death, permanent disability, or severe pain. If present, I asked the relatives or significant others of Noelle Hernandez Sr. to dissuade them without success. Prior to refusing, their nurse and I determined and agreed that Noelle Hernandez Sr. had the capacity to make this decision and understood the consequences of that decision. Alexandra Rivera signed the refusal of treatment form and their nurse signed the form agreeing that the patient/guardian had received informed consent. After refusal, I made every reasonable opportunity to treat Noelle Hernandez Sr. to the best of my ability. FINAL IMPRESSION      1. Chest pain, unspecified type          DISPOSITION/PLAN   DISPOSITION [place] 07/14/2022 11:37:55 PM      PATIENT REFERRED TO:  Veronica Cortez MD  [address]    Schedule an appointment as soon as possible for a visit       [facility] Emergency Department  [address] [place] [address]  [phone]  Go to   If symptoms worsen      DISCHARGE MEDICATIONS:  New Prescriptions    No medications on file       DISCONTINUED MEDICATIONS:  Discontinued Medications    AMLODIPINE (NORVASC) 5 MG

## 2022-07-15 NOTE — TELEPHONE ENCOUNTER
Pt called in stating that he went to the Ed on July 14, due to him thinking he was having a Heart attack. Pt states he was told to contact his cardiologist and get a stress test done. Pt is requesting DCE look over his test that were ran on July 14 while at [facility].    Pt decline an appt with NP stating he wanted to see Ray Melendrez. Pt is requesting a call and appt with DCE. Pt can be reached at [phone]    Pt has an Appt with DCE on 8/25 he things he should be seen earlier.

## 2022-07-15 NOTE — ED PROVIDER NOTES
This is an independent RAMEZ patient encounter. I am available for consultation if needed. I did not perform a face-to-face evaluation of this patient and was not asked to see the patient. I was not made aware of any details of the patient's H&P or medical decision making but was asked to review and document this EKG. See my interpretation of the EKG below. I shared my findings and interpretation with the RAMEZ for use in his/her independent management of this patient. See his/her note for details of the patient's history, physical, and all medical decision making.       The 12 lead EKG was interpreted by me as follows:  Rate: normal with a rate of 100  Rhythm: sinus  Axis: normal  Intervals: normal LA, narrow QRS, normal QTc  ST segments: no ST elevations or depressions  T waves: no abnormal inversions  Non-specific T wave changes: not present  Prior EKG comparison: EKG dated 10/31/16 is not significantly different          Pepe Adams MD  07/14/22 6853

## 2022-07-15 NOTE — ED NOTES
Patient chooses to leave AMA at this time. Patient is alert and oriented and is instructed to come back if any symptoms worsens or he beehives it is necessary. AMA form completed.        Laura Weeks RN  07/15/22 9716

## 2022-07-18 ENCOUNTER — TELEPHONE (OUTPATIENT)
Dept: CARDIOLOGY CLINIC | Age: 52
End: 2022-07-18

## 2022-07-18 NOTE — TELEPHONE ENCOUNTER
Spoke with patient at length about his symptoms. Patient will not do anything unless Shaji William says for him to do it. He is agreeable to wait until Monday when Shaji William returns to decide what to do about his chest pain. Appt scheduled for patient with DCE. Explained to the patient the importance of going to the ER if his symptoms worsen.

## 2022-07-27 NOTE — PROGRESS NOTES
this visit. PHYSICAL EXAM   Vitals Vitals:    07/28/22 1154   BP: 122/80   Pulse: 72   SpO2: 97%      Gen Alert, coop, no distress Heart  Rrr, no mrg   Head NC, AT, no abnorm Abd  Soft, NT, +BS, no mass, no OM   Eyes PER, conj/corn clear Ext  Ext nl, AT, no C/C/E   Nose Nares nl, no drain, NT Pulse 2+ and symmetric   Throat Lips, mucosa, tongue nl Skin Col/text/turg nl, no vis rash/les   Neck S/S, TM, NT, no bruit/JVD Psych Nl mood and affect   Lung CTA-B, unlabored, no DTP Lymph   No cervical or axillary LA   Ch wall NT, no deform Neuro  Nl gross M/S exam      CODING   SCI (14332) - CAD, HTN, CHOL, TOB  30-39 minutes preparing to see pt including review hx, tests, consults, perf exam, , educating pt, fam, caregiver, ordering meds/tests/procedures, referring and comm with pcps and other consultants, documenting info in EMR, interpreting results and communicating to fam and coordination of pt care.     ASSESSMENT AND PLAN     *CAD   Date EF Results   Sx   Unstable angina   Hx   MVPCI   C 10/10  11/16 40% PCI LAD and RCA (B [place])  PCI of LAD/kissing balloon diagonal with 1 KARLY Annacarole Amor)   MPI 10/15 71% Small apical infarct, no ischemia   TTE 1/14 55% Apical anterior hk   Plan   Recommended report to ER for evaluation as soon as possible   *HTN  Status Controlled, vitals and available ambulatory monitoring logs reviewed personally  Plan Counseled on diet/salt/exercise/weight, continue meds at doses above  *CHOL  Status  uncontrolled with last LDL of 117(goal <70) and HDL of 24(2/22), labs reviewed personally  Plan Counseled on diet/exercise/weight, no statin due to allergy, lipid/liver surveillance per PCP, refuses injectables  *TOB  Status Continues to smoke  Plan Continued avoidance of 1st and 2nd hand smoke, counseled on risks/cessation  *COMPLIANCE  Status Compliant  Plan Discussed importance of compliance with meds/diet/salt/exercise; avoid tob/alc/drugs; patient verbalized understanding  *FOLLOWUP  After inpatient workup    [address] Keisha Zee, am scribing for and in the presence of Murali Garay MD.   SignedKeisha 07/27/22 8:16 AM   Provider Jose Villeda is working as a scribe for and in the presence of me Murali Garay MD). Working as a scribe, Keisha Rincon may have prepopulated components of this note with my historical  intellectual property under my direct supervision. Any additions to this intellectual property were performed in my presence and at my direction.   Furthermore, the content and accuracy of this note have been reviewed by me Murali Garay MD).  7/28/2022 7:35 AM

## 2022-07-28 ENCOUNTER — TELEPHONE (OUTPATIENT)
Dept: CARDIOLOGY CLINIC | Age: 52
End: 2022-07-28

## 2022-07-28 ENCOUNTER — OFFICE VISIT (OUTPATIENT)
Dept: CARDIOLOGY CLINIC | Age: 52
End: 2022-07-28
Payer: COMMERCIAL

## 2022-07-28 VITALS
SYSTOLIC BLOOD PRESSURE: 122 MMHG | WEIGHT: 222.8 LBS | OXYGEN SATURATION: 97 % | HEART RATE: 72 BPM | DIASTOLIC BLOOD PRESSURE: 80 MMHG | BODY MASS INDEX: 29.53 KG/M2 | HEIGHT: 73 IN

## 2022-07-28 DIAGNOSIS — I25.83 CORONARY ARTERY DISEASE DUE TO LIPID RICH PLAQUE: Primary | ICD-10-CM

## 2022-07-28 DIAGNOSIS — Z72.0 TOBACCO ABUSE: ICD-10-CM

## 2022-07-28 DIAGNOSIS — I10 ESSENTIAL HYPERTENSION: ICD-10-CM

## 2022-07-28 DIAGNOSIS — I20.0 UNSTABLE ANGINA (HCC): ICD-10-CM

## 2022-07-28 DIAGNOSIS — E78.00 HYPERCHOLESTEREMIA: ICD-10-CM

## 2022-07-28 DIAGNOSIS — I25.10 CORONARY ARTERY DISEASE DUE TO LIPID RICH PLAQUE: Primary | ICD-10-CM

## 2022-07-28 PROCEDURE — 99214 OFFICE O/P EST MOD 30 MIN: CPT | Performed by: INTERNAL MEDICINE

## 2022-07-28 NOTE — TELEPHONE ENCOUNTER
Called pt and relayed message per DCE with verbal understanding from pt and encouraged to call office with any other questions or concerns.

## 2022-07-28 NOTE — TELEPHONE ENCOUNTER
Pt called wanting to know of he is to start taking Plavix today are tomorrow. Please call Pt to discuss. Thank you.

## 2022-07-29 ENCOUNTER — APPOINTMENT (OUTPATIENT)
Dept: GENERAL RADIOLOGY | Age: 52
DRG: 234 | End: 2022-07-29
Payer: COMMERCIAL

## 2022-07-29 ENCOUNTER — APPOINTMENT (OUTPATIENT)
Dept: CT IMAGING | Age: 52
DRG: 234 | End: 2022-07-29
Payer: COMMERCIAL

## 2022-07-29 ENCOUNTER — HOSPITAL ENCOUNTER (INPATIENT)
Age: 52
LOS: 7 days | Discharge: HOME HEALTH | DRG: 234 | End: 2022-08-05
Attending: EMERGENCY MEDICINE | Admitting: HOSPITALIST
Payer: COMMERCIAL

## 2022-07-29 ENCOUNTER — PREP FOR PROCEDURE (OUTPATIENT)
Dept: CARDIOTHORACIC SURGERY | Age: 52
End: 2022-07-29

## 2022-07-29 DIAGNOSIS — Z95.1 S/P CABG X 3: ICD-10-CM

## 2022-07-29 DIAGNOSIS — R07.89 CHEST DISCOMFORT: Primary | ICD-10-CM

## 2022-07-29 DIAGNOSIS — I25.10 CORONARY ARTERY DISEASE INVOLVING NATIVE HEART, UNSPECIFIED VESSEL OR LESION TYPE, UNSPECIFIED WHETHER ANGINA PRESENT: ICD-10-CM

## 2022-07-29 PROBLEM — R07.9 CHEST PAIN: Status: ACTIVE | Noted: 2022-07-29

## 2022-07-29 LAB
A/G RATIO: 1.6 (ref 1.1–2.2)
A/G RATIO: 1.8 (ref 1.1–2.2)
ABO/RH: NORMAL
ALBUMIN SERPL-MCNC: 4.2 G/DL (ref 3.4–5)
ALBUMIN SERPL-MCNC: 5.1 G/DL (ref 3.4–5)
ALP BLD-CCNC: 104 U/L (ref 40–129)
ALP BLD-CCNC: 126 U/L (ref 40–129)
ALT SERPL-CCNC: 38 U/L (ref 10–40)
ALT SERPL-CCNC: 46 U/L (ref 10–40)
ANION GAP SERPL CALCULATED.3IONS-SCNC: 11 MMOL/L (ref 3–16)
ANION GAP SERPL CALCULATED.3IONS-SCNC: 11 MMOL/L (ref 3–16)
ANTIBODY SCREEN: NORMAL
APTT: 56.2 SEC (ref 23–34.3)
AST SERPL-CCNC: 22 U/L (ref 15–37)
AST SERPL-CCNC: 25 U/L (ref 15–37)
BASE EXCESS ARTERIAL: 0.6 MMOL/L (ref -3–3)
BASOPHILS ABSOLUTE: 0.1 K/UL (ref 0–0.2)
BASOPHILS ABSOLUTE: 0.1 K/UL (ref 0–0.2)
BASOPHILS RELATIVE PERCENT: 1.3 %
BASOPHILS RELATIVE PERCENT: 1.4 %
BILIRUB SERPL-MCNC: 0.6 MG/DL (ref 0–1)
BILIRUB SERPL-MCNC: 0.8 MG/DL (ref 0–1)
BILIRUBIN URINE: NEGATIVE
BLOOD, URINE: NEGATIVE
BUN BLDV-MCNC: 13 MG/DL (ref 7–20)
BUN BLDV-MCNC: 14 MG/DL (ref 7–20)
CALCIUM SERPL-MCNC: 10.2 MG/DL (ref 8.3–10.6)
CALCIUM SERPL-MCNC: 8.7 MG/DL (ref 8.3–10.6)
CARBOXYHEMOGLOBIN ARTERIAL: 1.3 % (ref 0–1.5)
CHLORIDE BLD-SCNC: 102 MMOL/L (ref 99–110)
CHLORIDE BLD-SCNC: 99 MMOL/L (ref 99–110)
CLARITY: CLEAR
CO2: 25 MMOL/L (ref 21–32)
CO2: 27 MMOL/L (ref 21–32)
COLOR: YELLOW
CREAT SERPL-MCNC: 0.9 MG/DL (ref 0.9–1.3)
CREAT SERPL-MCNC: 1 MG/DL (ref 0.9–1.3)
EKG ATRIAL RATE: 79 BPM
EKG DIAGNOSIS: NORMAL
EKG P AXIS: 18 DEGREES
EKG P-R INTERVAL: 192 MS
EKG Q-T INTERVAL: 386 MS
EKG QRS DURATION: 88 MS
EKG QTC CALCULATION (BAZETT): 442 MS
EKG R AXIS: -11 DEGREES
EKG T AXIS: -10 DEGREES
EKG VENTRICULAR RATE: 79 BPM
EOSINOPHILS ABSOLUTE: 0.3 K/UL (ref 0–0.6)
EOSINOPHILS ABSOLUTE: 0.3 K/UL (ref 0–0.6)
EOSINOPHILS RELATIVE PERCENT: 4.6 %
EOSINOPHILS RELATIVE PERCENT: 5.5 %
GFR AFRICAN AMERICAN: >60
GFR AFRICAN AMERICAN: >60
GFR NON-AFRICAN AMERICAN: >60
GFR NON-AFRICAN AMERICAN: >60
GLUCOSE BLD-MCNC: 110 MG/DL (ref 70–99)
GLUCOSE BLD-MCNC: 113 MG/DL (ref 70–99)
GLUCOSE URINE: NEGATIVE MG/DL
HCO3 ARTERIAL: 26.1 MMOL/L (ref 21–29)
HCT VFR BLD CALC: 40.5 % (ref 40.5–52.5)
HCT VFR BLD CALC: 46.1 % (ref 40.5–52.5)
HEMOGLOBIN, ART, EXTENDED: 14.5 G/DL (ref 13.5–17.5)
HEMOGLOBIN: 13.9 G/DL (ref 13.5–17.5)
HEMOGLOBIN: 16.1 G/DL (ref 13.5–17.5)
INR BLD: 1.16 (ref 0.87–1.14)
KETONES, URINE: NEGATIVE MG/DL
LEFT VENTRICULAR EJECTION FRACTION MODE: NORMAL
LEUKOCYTE ESTERASE, URINE: NEGATIVE
LV EF: 65 %
LYMPHOCYTES ABSOLUTE: 1.9 K/UL (ref 1–5.1)
LYMPHOCYTES ABSOLUTE: 1.9 K/UL (ref 1–5.1)
LYMPHOCYTES RELATIVE PERCENT: 27 %
LYMPHOCYTES RELATIVE PERCENT: 31.2 %
MAGNESIUM: 2.1 MG/DL (ref 1.8–2.4)
MCH RBC QN AUTO: 30.9 PG (ref 26–34)
MCH RBC QN AUTO: 31.4 PG (ref 26–34)
MCHC RBC AUTO-ENTMCNC: 34.3 G/DL (ref 31–36)
MCHC RBC AUTO-ENTMCNC: 34.9 G/DL (ref 31–36)
MCV RBC AUTO: 90 FL (ref 80–100)
MCV RBC AUTO: 90 FL (ref 80–100)
METHEMOGLOBIN ARTERIAL: 0.6 %
MICROSCOPIC EXAMINATION: NORMAL
MONOCYTES ABSOLUTE: 0.7 K/UL (ref 0–1.3)
MONOCYTES ABSOLUTE: 0.7 K/UL (ref 0–1.3)
MONOCYTES RELATIVE PERCENT: 10.6 %
MONOCYTES RELATIVE PERCENT: 12 %
NEUTROPHILS ABSOLUTE: 3.1 K/UL (ref 1.7–7.7)
NEUTROPHILS ABSOLUTE: 3.9 K/UL (ref 1.7–7.7)
NEUTROPHILS RELATIVE PERCENT: 50 %
NEUTROPHILS RELATIVE PERCENT: 56.4 %
NITRITE, URINE: NEGATIVE
O2 SAT, ARTERIAL: 97.1 %
O2 THERAPY: NORMAL
PCO2 ARTERIAL: 44 MMHG (ref 35–45)
PDW BLD-RTO: 13.3 % (ref 12.4–15.4)
PDW BLD-RTO: 13.6 % (ref 12.4–15.4)
PH ARTERIAL: 7.38 (ref 7.35–7.45)
PH UA: 7 (ref 5–8)
PLATELET # BLD: 175 K/UL (ref 135–450)
PLATELET # BLD: 199 K/UL (ref 135–450)
PMV BLD AUTO: 7.4 FL (ref 5–10.5)
PMV BLD AUTO: 8.1 FL (ref 5–10.5)
PO2 ARTERIAL: 88.9 MMHG (ref 75–108)
POTASSIUM REFLEX MAGNESIUM: 3.7 MMOL/L (ref 3.5–5.1)
POTASSIUM SERPL-SCNC: 3.4 MMOL/L (ref 3.5–5.1)
PRO-BNP: 20 PG/ML (ref 0–124)
PROTEIN UA: NEGATIVE MG/DL
PROTHROMBIN TIME: 14.7 SEC (ref 11.7–14.5)
RBC # BLD: 4.49 M/UL (ref 4.2–5.9)
RBC # BLD: 5.12 M/UL (ref 4.2–5.9)
SODIUM BLD-SCNC: 137 MMOL/L (ref 136–145)
SODIUM BLD-SCNC: 138 MMOL/L (ref 136–145)
SPECIFIC GRAVITY UA: >=1.03 (ref 1–1.03)
TCO2 ARTERIAL: 61.4 MMOL/L
TOTAL PROTEIN: 6.8 G/DL (ref 6.4–8.2)
TOTAL PROTEIN: 7.9 G/DL (ref 6.4–8.2)
TROPONIN: <0.01 NG/ML
URINE REFLEX TO CULTURE: NORMAL
URINE TYPE: NORMAL
UROBILINOGEN, URINE: 1 E.U./DL
WBC # BLD: 6.2 K/UL (ref 4–11)
WBC # BLD: 6.9 K/UL (ref 4–11)

## 2022-07-29 PROCEDURE — 86850 RBC ANTIBODY SCREEN: CPT

## 2022-07-29 PROCEDURE — 93005 ELECTROCARDIOGRAM TRACING: CPT | Performed by: EMERGENCY MEDICINE

## 2022-07-29 PROCEDURE — 6370000000 HC RX 637 (ALT 250 FOR IP): Performed by: PHYSICIAN ASSISTANT

## 2022-07-29 PROCEDURE — 93458 L HRT ARTERY/VENTRICLE ANGIO: CPT | Performed by: INTERNAL MEDICINE

## 2022-07-29 PROCEDURE — 2580000003 HC RX 258: Performed by: HOSPITALIST

## 2022-07-29 PROCEDURE — 1200000000 HC SEMI PRIVATE

## 2022-07-29 PROCEDURE — 81003 URINALYSIS AUTO W/O SCOPE: CPT

## 2022-07-29 PROCEDURE — 6370000000 HC RX 637 (ALT 250 FOR IP): Performed by: HOSPITALIST

## 2022-07-29 PROCEDURE — 83721 ASSAY OF BLOOD LIPOPROTEIN: CPT

## 2022-07-29 PROCEDURE — 86901 BLOOD TYPING SEROLOGIC RH(D): CPT

## 2022-07-29 PROCEDURE — B2111ZZ FLUOROSCOPY OF MULTIPLE CORONARY ARTERIES USING LOW OSMOLAR CONTRAST: ICD-10-PCS | Performed by: ANESTHESIOLOGY

## 2022-07-29 PROCEDURE — 83036 HEMOGLOBIN GLYCOSYLATED A1C: CPT

## 2022-07-29 PROCEDURE — 99152 MOD SED SAME PHYS/QHP 5/>YRS: CPT | Performed by: INTERNAL MEDICINE

## 2022-07-29 PROCEDURE — 2500000003 HC RX 250 WO HCPCS

## 2022-07-29 PROCEDURE — 6360000004 HC RX CONTRAST MEDICATION: Performed by: INTERNAL MEDICINE

## 2022-07-29 PROCEDURE — 83880 ASSAY OF NATRIURETIC PEPTIDE: CPT

## 2022-07-29 PROCEDURE — 85730 THROMBOPLASTIN TIME PARTIAL: CPT

## 2022-07-29 PROCEDURE — 4A023N7 MEASUREMENT OF CARDIAC SAMPLING AND PRESSURE, LEFT HEART, PERCUTANEOUS APPROACH: ICD-10-PCS | Performed by: ANESTHESIOLOGY

## 2022-07-29 PROCEDURE — B2151ZZ FLUOROSCOPY OF LEFT HEART USING LOW OSMOLAR CONTRAST: ICD-10-PCS | Performed by: ANESTHESIOLOGY

## 2022-07-29 PROCEDURE — 85610 PROTHROMBIN TIME: CPT

## 2022-07-29 PROCEDURE — 85025 COMPLETE CBC W/AUTO DIFF WBC: CPT

## 2022-07-29 PROCEDURE — 6370000000 HC RX 637 (ALT 250 FOR IP): Performed by: INTERNAL MEDICINE

## 2022-07-29 PROCEDURE — 86900 BLOOD TYPING SEROLOGIC ABO: CPT

## 2022-07-29 PROCEDURE — 74176 CT ABD & PELVIS W/O CONTRAST: CPT

## 2022-07-29 PROCEDURE — 99285 EMERGENCY DEPT VISIT HI MDM: CPT

## 2022-07-29 PROCEDURE — 2709999900 HC NON-CHARGEABLE SUPPLY

## 2022-07-29 PROCEDURE — 80053 COMPREHEN METABOLIC PANEL: CPT

## 2022-07-29 PROCEDURE — 93458 L HRT ARTERY/VENTRICLE ANGIO: CPT

## 2022-07-29 PROCEDURE — C1894 INTRO/SHEATH, NON-LASER: HCPCS

## 2022-07-29 PROCEDURE — C1769 GUIDE WIRE: HCPCS

## 2022-07-29 PROCEDURE — 36415 COLL VENOUS BLD VENIPUNCTURE: CPT

## 2022-07-29 PROCEDURE — 80061 LIPID PANEL: CPT

## 2022-07-29 PROCEDURE — 99152 MOD SED SAME PHYS/QHP 5/>YRS: CPT

## 2022-07-29 PROCEDURE — 2580000003 HC RX 258

## 2022-07-29 PROCEDURE — 94760 N-INVAS EAR/PLS OXIMETRY 1: CPT

## 2022-07-29 PROCEDURE — 83735 ASSAY OF MAGNESIUM: CPT

## 2022-07-29 PROCEDURE — 71045 X-RAY EXAM CHEST 1 VIEW: CPT

## 2022-07-29 PROCEDURE — 93010 ELECTROCARDIOGRAM REPORT: CPT | Performed by: INTERNAL MEDICINE

## 2022-07-29 PROCEDURE — 99222 1ST HOSP IP/OBS MODERATE 55: CPT | Performed by: INTERNAL MEDICINE

## 2022-07-29 PROCEDURE — 99153 MOD SED SAME PHYS/QHP EA: CPT

## 2022-07-29 PROCEDURE — 84484 ASSAY OF TROPONIN QUANT: CPT

## 2022-07-29 PROCEDURE — 82803 BLOOD GASES ANY COMBINATION: CPT

## 2022-07-29 PROCEDURE — 6360000002 HC RX W HCPCS

## 2022-07-29 RX ORDER — ALPRAZOLAM 0.25 MG/1
0.25 TABLET ORAL
Status: CANCELLED | OUTPATIENT
Start: 2022-07-31 | End: 2022-07-31

## 2022-07-29 RX ORDER — ACETAMINOPHEN 325 MG/1
650 TABLET ORAL EVERY 6 HOURS PRN
Status: DISCONTINUED | OUTPATIENT
Start: 2022-07-29 | End: 2022-07-29

## 2022-07-29 RX ORDER — ASPIRIN 81 MG/1
81 TABLET ORAL DAILY
Status: DISCONTINUED | OUTPATIENT
Start: 2022-07-29 | End: 2022-08-01 | Stop reason: DRUGHIGH

## 2022-07-29 RX ORDER — SODIUM CHLORIDE 9 MG/ML
INJECTION, SOLUTION INTRAVENOUS PRN
Status: DISCONTINUED | OUTPATIENT
Start: 2022-07-29 | End: 2022-08-05 | Stop reason: HOSPADM

## 2022-07-29 RX ORDER — SODIUM CHLORIDE, SODIUM LACTATE, POTASSIUM CHLORIDE, CALCIUM CHLORIDE 600; 310; 30; 20 MG/100ML; MG/100ML; MG/100ML; MG/100ML
INJECTION, SOLUTION INTRAVENOUS CONTINUOUS
Status: CANCELLED | OUTPATIENT
Start: 2022-07-30

## 2022-07-29 RX ORDER — SODIUM CHLORIDE 0.9 % (FLUSH) 0.9 %
5-40 SYRINGE (ML) INJECTION EVERY 12 HOURS SCHEDULED
Status: DISCONTINUED | OUTPATIENT
Start: 2022-07-29 | End: 2022-08-05 | Stop reason: HOSPADM

## 2022-07-29 RX ORDER — ACETAMINOPHEN 650 MG/1
650 SUPPOSITORY RECTAL EVERY 6 HOURS PRN
Status: DISCONTINUED | OUTPATIENT
Start: 2022-07-29 | End: 2022-07-29

## 2022-07-29 RX ORDER — POLYETHYLENE GLYCOL 3350 17 G/17G
17 POWDER, FOR SOLUTION ORAL DAILY PRN
Status: DISCONTINUED | OUTPATIENT
Start: 2022-07-29 | End: 2022-08-05 | Stop reason: HOSPADM

## 2022-07-29 RX ORDER — MORPHINE SULFATE 2 MG/ML
2 INJECTION, SOLUTION INTRAMUSCULAR; INTRAVENOUS EVERY 4 HOURS PRN
Status: DISCONTINUED | OUTPATIENT
Start: 2022-07-29 | End: 2022-08-03

## 2022-07-29 RX ORDER — ONDANSETRON 2 MG/ML
4 INJECTION INTRAMUSCULAR; INTRAVENOUS EVERY 6 HOURS PRN
Status: DISCONTINUED | OUTPATIENT
Start: 2022-07-29 | End: 2022-08-01 | Stop reason: SDUPTHER

## 2022-07-29 RX ORDER — ASPIRIN 81 MG/1
243 TABLET, CHEWABLE ORAL ONCE
Status: COMPLETED | OUTPATIENT
Start: 2022-07-29 | End: 2022-07-29

## 2022-07-29 RX ORDER — ENOXAPARIN SODIUM 100 MG/ML
30 INJECTION SUBCUTANEOUS 2 TIMES DAILY
Status: DISCONTINUED | OUTPATIENT
Start: 2022-07-29 | End: 2022-08-01 | Stop reason: ALTCHOICE

## 2022-07-29 RX ORDER — GABAPENTIN 100 MG/1
600 CAPSULE ORAL ONCE
Status: CANCELLED | OUTPATIENT
Start: 2022-08-01

## 2022-07-29 RX ORDER — NICOTINE 21 MG/24HR
1 PATCH, TRANSDERMAL 24 HOURS TRANSDERMAL DAILY
Status: DISCONTINUED | OUTPATIENT
Start: 2022-07-29 | End: 2022-08-02

## 2022-07-29 RX ORDER — CLONAZEPAM 1 MG/1
0.5 TABLET ORAL 2 TIMES DAILY PRN
Status: DISCONTINUED | OUTPATIENT
Start: 2022-07-29 | End: 2022-08-05 | Stop reason: HOSPADM

## 2022-07-29 RX ORDER — PANTOPRAZOLE SODIUM 40 MG/1
40 TABLET, DELAYED RELEASE ORAL DAILY
Status: DISCONTINUED | OUTPATIENT
Start: 2022-07-29 | End: 2022-08-02 | Stop reason: SDUPTHER

## 2022-07-29 RX ORDER — ONDANSETRON 4 MG/1
4 TABLET, ORALLY DISINTEGRATING ORAL EVERY 8 HOURS PRN
Status: DISCONTINUED | OUTPATIENT
Start: 2022-07-29 | End: 2022-08-01 | Stop reason: SDUPTHER

## 2022-07-29 RX ORDER — SODIUM CHLORIDE 0.9 % (FLUSH) 0.9 %
5-40 SYRINGE (ML) INJECTION PRN
Status: DISCONTINUED | OUTPATIENT
Start: 2022-07-29 | End: 2022-08-05 | Stop reason: HOSPADM

## 2022-07-29 RX ORDER — PANTOPRAZOLE SODIUM 40 MG/10ML
40 INJECTION, POWDER, LYOPHILIZED, FOR SOLUTION INTRAVENOUS ONCE
Status: CANCELLED | OUTPATIENT
Start: 2022-08-01

## 2022-07-29 RX ORDER — CHLORHEXIDINE GLUCONATE 4 G/100ML
SOLUTION TOPICAL SEE ADMIN INSTRUCTIONS
Status: CANCELLED | OUTPATIENT
Start: 2022-07-29

## 2022-07-29 RX ORDER — ACETAMINOPHEN 325 MG/1
650 TABLET ORAL EVERY 4 HOURS PRN
Status: DISCONTINUED | OUTPATIENT
Start: 2022-07-29 | End: 2022-08-01

## 2022-07-29 RX ADMIN — CLONAZEPAM 0.5 MG: 1 TABLET ORAL at 20:09

## 2022-07-29 RX ADMIN — ASPIRIN 243 MG: 81 TABLET, CHEWABLE ORAL at 08:26

## 2022-07-29 RX ADMIN — METOPROLOL TARTRATE 25 MG: 25 TABLET, FILM COATED ORAL at 20:09

## 2022-07-29 RX ADMIN — Medication 10 ML: at 20:09

## 2022-07-29 RX ADMIN — ACETAMINOPHEN 650 MG: 325 TABLET ORAL at 23:51

## 2022-07-29 RX ADMIN — IOPAMIDOL 46 ML: 755 INJECTION, SOLUTION INTRAVENOUS at 14:43

## 2022-07-29 ASSESSMENT — ENCOUNTER SYMPTOMS
COLOR CHANGE: 0
DIARRHEA: 0
COUGH: 0
SHORTNESS OF BREATH: 1
ABDOMINAL PAIN: 0
CONSTIPATION: 0
CHEST TIGHTNESS: 1
NAUSEA: 0
BACK PAIN: 0
VOMITING: 0

## 2022-07-29 ASSESSMENT — PAIN - FUNCTIONAL ASSESSMENT: PAIN_FUNCTIONAL_ASSESSMENT: 0-10

## 2022-07-29 ASSESSMENT — PAIN SCALES - GENERAL
PAINLEVEL_OUTOF10: 2
PAINLEVEL_OUTOF10: 0
PAINLEVEL_OUTOF10: 1
PAINLEVEL_OUTOF10: 1
PAINLEVEL_OUTOF10: 0

## 2022-07-29 ASSESSMENT — PAIN DESCRIPTION - DESCRIPTORS: DESCRIPTORS: ACHING

## 2022-07-29 ASSESSMENT — LIFESTYLE VARIABLES: HOW OFTEN DO YOU HAVE A DRINK CONTAINING ALCOHOL: NEVER

## 2022-07-29 ASSESSMENT — PAIN DESCRIPTION - LOCATION: LOCATION: HEAD

## 2022-07-29 NOTE — PROGRESS NOTES
Pt admitted to room 3312  Oriented to room and call light system. Family at bedside. Pt denies chest pain at the time. Pt states chest pain comes and goes. Stated he stopped smoking 3 weeks ago. Plan for [facility] today.

## 2022-07-29 NOTE — CONSULTS
[address]  H+P  Consult  OP Visit  FU Visit   CC HPI   UA Presents with cp and sob. Cp substernal, exertional, pressure, radiating to neck and arm, relieved with rest, similar to prestenting. HISTORY/ALLERGY/ROS   MEDHx  has a past medical history of Hypertension and MI (mitral incompetence). SURGHx  has a past surgical history that includes Coronary angioplasty with stent; Appendectomy; Nasal septum surgery (11/2014); LASIK (11/15/2020); and back surgery. SOCHx  reports that he has been smoking cigarettes. He has been smoking an average of .25 packs per day. He has never used smokeless tobacco. He reports current drug use. Drug: Marijuana Bedoya Arvind). He reports that he does not drink alcohol. FAMHx family history includes Diabetes in his father and mother; Heart Disease in his father; High Blood Pressure in his father and mother; High Cholesterol in his father and mother.    ALLERG Fenofibrate and Statins   ROS Full ROS obtained and negative except as mentioned in HPI   MEDICATIONS   Current Facility-Administered Medications   Medication Dose Route Frequency Provider Last Rate Last Admin    aspirin EC tablet 81 mg  81 mg Oral Daily Yuridia Barreto MD        clonazePAM (KLONOPIN) tablet 0.5 mg  0.5 mg Oral BID PRN Arneta Najjar, MD        metoprolol tartrate (LOPRESSOR) tablet 25 mg  25 mg Oral BID Arneta Najjar, MD        pantoprazole (PROTONIX) tablet 40 mg  40 mg Oral Daily Yuridia Barreto MD        sodium chloride flush 0.9 % injection 5-40 mL  5-40 mL IntraVENous 2 times per day Arneta Najjar, MD        sodium chloride flush 0.9 % injection 5-40 mL  5-40 mL IntraVENous PRN Yuridia Barreto MD        0.9 % sodium chloride infusion   IntraVENous PRN Arneta Najjar, MD        ondansetron (ZOFRAN-ODT) disintegrating tablet 4 mg  4 mg Oral Q8H PRN Yuridia Barreto MD        Or    ondansetron (ZOFRAN) injection 4 mg  4 mg IntraVENous Q6H PRN Yuridia Barreto MD        polyethylene glycol (GLYCOLAX) packet 17 g  17 g Oral Daily PRN Yuridia Joseph MD        acetaminophen (TYLENOL) tablet 650 mg  650 mg Oral Q6H PRN Yuridia Barreto MD        Or    acetaminophen (TYLENOL) suppository 650 mg  650 mg Rectal Q6H PRN Yuridia Barreto MD        morphine (PF) injection 2 mg  2 mg IntraVENous Q4H PRN Yuridia Barreto MD        enoxaparin Sodium (LOVENOX) injection 30 mg  30 mg SubCUTAneous BID Yuridia Barreto MD          PHYSICAL EXAM   Vitals Vitals:    07/29/22 1035   BP:    Pulse:    Resp: 18   Temp:    SpO2: 97%      Gen Alert, coop, no distress Heart  Rrr, no mrg   Head NC, AT, no abnorm Abd  Soft, NT, +BS, no mass, no OM   Eyes PER, conj/corn clear Ext  Ext nl, AT, no C/C/E   Nose Nares nl, no drain, NT Pulse 2+ and symmetric   Throat Lips, mucosa, tongue nl Skin Col/text/turg nl, no vis rash/les   Neck S/S, TM, NT, no bruit/JVD Psych Nl mood and affect   Lung CTA-B, unlabored, no DTP Lymph   No cervical or axillary LA   Ch wall NT, no deform Neuro  Nl gross M/S exam      ASSESSMENT AND PLAN     *UA  Status Hx of MV PCI, now with recurrent UA  Plan LHC, R/B/O discussed  *TOB  Status Active smoker  Plan Cessation discussed

## 2022-07-29 NOTE — ED PROVIDER NOTES
[address] ENCOUNTER        Pt Name: Nichole Bae Sr. MRN: [id number]  Birthdate 1970  Date of evaluation: 7/29/2022  Provider: LACHO Teresa  PCP: Db REYES  Note Started: 8:18 AM EDT       RAMEZ. I have evaluated this patient. My supervising physician was available for consultation. CHIEF COMPLAINT       Chief Complaint   Patient presents with    Chest Pain     Patient states CP x2 month, intermittent, states pain depends \"on how worked up I get\" saw Joanne Elizondo and was told if pain continues to come to ER       HISTORY OF PRESENT ILLNESS   (Location, Timing/Onset, Context/Setting, Quality, Duration, Modifying Factors, Severity, Associated Signs and Symptoms)  Note limiting factors. Chief Complaint: CP    Karissa Mitchell is a 47 y.o. male with past medical history of hypertension and CAD who presents to the ED with complaint of chest pain. He states he has had intermittent exertional chest pain to his left-sided chest that radiates into his left-sided neck/shoulder for the past several months. He states pain wax and wanes in intensity and seems to worsen with exertion. States alleviated with rest.  He states he saw a cardiologist, Dr. Kenn Cardenas, yesterday in the office and was told to come to the emergency department should he have continued symptoms. Patient states he currently has pain that he rates as a 1.5 out of 10 and described as a burning to his left-sided chest with radiation into the neck. He states when pain gets worse he has associated shortness of breath. He denies shortness of breath currently. Denies cough, pleuritic pain, hemoptysis, orthopnea, pedal edema or calf tenderness. Denies diaphoresis, lightheadedness/dizziness, headache, syncope, near syncope, abdominal pain, nausea/vomiting, urinary symptoms or changes in bowel movements. States has history of previous CAD with stents x4.   States last cardiac work-up was in 2016. States he is a smoker. Nursing Notes were all reviewed and agreed with or any disagreements were addressed in the HPI. REVIEW OF SYSTEMS    (2-9 systems for level 4, 10 or more for level 5)     Review of Systems   Constitutional:  Positive for activity change and fatigue. Negative for appetite change, chills, diaphoresis and fever. Respiratory:  Positive for chest tightness and shortness of breath. Negative for cough. Cardiovascular:  Positive for chest pain. Negative for palpitations and leg swelling. Gastrointestinal:  Negative for abdominal pain, constipation, diarrhea, nausea and vomiting. Genitourinary:  Negative for decreased urine volume, difficulty urinating, dysuria, flank pain, frequency, hematuria and urgency. Musculoskeletal:  Negative for arthralgias, back pain, myalgias, neck pain and neck stiffness. Skin:  Negative for color change, pallor, rash and wound. Neurological:  Negative for dizziness, light-headedness and headaches. Positives and Pertinent negatives as per HPI. Except as noted above in the ROS, all other systems were reviewed and negative. PAST MEDICAL HISTORY     Past Medical History:   Diagnosis Date    Hypertension     MI (mitral incompetence)          SURGICAL HISTORY     Past Surgical History:   Procedure Laterality Date    APPENDECTOMY      CORONARY ANGIOPLASTY WITH STENT PLACEMENT      LASIK  11/15/2020    NASAL SEPTUM SURGERY  11/2014         CURRENTMEDICATIONS       Previous Medications    ACETYLCYSTEINE (NAC PO)    Take by mouth    ASPIRIN 81 MG EC TABLET    Take 1 tablet by mouth daily    CLONAZEPAM (KLONOPIN) 0.5 MG TABLET    Take 0.5 mg by mouth 2 times daily as needed.     METOPROLOL SUCCINATE (TOPROL XL) 25 MG EXTENDED RELEASE TABLET    Take 25 mg by mouth daily    METOPROLOL TARTRATE (LOPRESSOR) 25 MG TABLET    TAKE 1 TABLET TWICE A DAY, THIRD DOSE IF HEART RATE IS UP    METOPROLOL TARTRATE (LOPRESSOR) 25 MG TABLET Take 25 mg by mouth in the morning and 25 mg before bedtime. Pt takes 5 mg daily. PANTOPRAZOLE (PROTONIX) 40 MG TABLET    Take 40 mg by mouth daily         ALLERGIES     Fenofibrate and Statins    FAMILYHISTORY       Family History   Problem Relation Age of Onset    High Blood Pressure Mother     High Cholesterol Mother     Diabetes Mother     Heart Disease Father     Diabetes Father     High Blood Pressure Father     High Cholesterol Father           SOCIAL HISTORY       Social History     Tobacco Use    Smoking status: Every Day     Packs/day: 0.25     Types: Cigarettes    Smokeless tobacco: Never    Tobacco comments:     1 ppd   Vaping Use    Vaping Use: Never used   Substance Use Topics    Alcohol use: No    Drug use: No     Comment: quit 3-4 months ago       SCREENINGS    Jared Coma Scale  Eye Opening: Spontaneous  Best Verbal Response: Oriented  Best Motor Response: Obeys commands  Jared Coma Scale Score: 15        PHYSICAL EXAM    (up to 7 for level 4, 8 or more for level 5)     ED Triage Vitals [07/29/22 0757]   BP Temp Temp src Heart Rate Resp SpO2 Height Weight   (!) 162/89 97.6 °F (36.4 °C) -- 80 16 98 % -- --       Physical Exam  Constitutional:       General: He is not in acute distress. Appearance: Normal appearance. He is well-developed. He is not ill-appearing, toxic-appearing or diaphoretic. HENT:      Head: Normocephalic and atraumatic. Right Ear: External ear normal.      Left Ear: External ear normal.   Eyes:      General:         Right eye: No discharge. Left eye: No discharge. Conjunctiva/sclera: Conjunctivae normal.   Cardiovascular:      Rate and Rhythm: Normal rate and regular rhythm. Pulses: Normal pulses. Heart sounds: Normal heart sounds. No murmur heard. No friction rub. No gallop. Comments: 2+ radial pulses bilaterally. No pedal edema. No calf tenderness. No JVD.   Pulmonary:      Effort: Pulmonary effort is normal. No respiratory distress. Breath sounds: Normal breath sounds. No stridor. No wheezing, rhonchi or rales. Chest:      Chest wall: No tenderness. Abdominal:      General: Abdomen is flat. Bowel sounds are normal. There is no distension. Palpations: Abdomen is soft. There is no mass. Tenderness: There is no abdominal tenderness. There is no right CVA tenderness, left CVA tenderness, guarding or rebound. Negative signs include Alejandro's sign and McBurney's sign. Hernia: No hernia is present. Musculoskeletal:         General: Normal range of motion. Cervical back: Normal range of motion and neck supple. No rigidity or tenderness. Lymphadenopathy:      Cervical: No cervical adenopathy. Skin:     General: Skin is warm and dry. Coloration: Skin is not pale. Findings: No erythema or rash. Neurological:      Mental Status: He is alert and oriented to person, place, and time. Psychiatric:         Behavior: Behavior normal.       DIAGNOSTIC RESULTS   LABS:    Labs Reviewed   CBC WITH AUTO DIFFERENTIAL   COMPREHENSIVE METABOLIC PANEL W/ REFLEX TO MG FOR LOW K   TROPONIN   BRAIN NATRIURETIC PEPTIDE       When ordered only abnormal lab results are displayed. All other labs were within normal range or not returned as of this dictation. EKG: When ordered, EKG's are interpreted by the Emergency Department Physician in the absence of a cardiologist.  Please see their note for interpretation of EKG. RADIOLOGY:   Non-plain film images such as CT, Ultrasound and MRI are read by the radiologist. Plain radiographic images are visualized and preliminarily interpreted by the ED Provider with the below findings:        Interpretation per the Radiologist below, if available at the time of this note:    XR CHEST PORTABLE   Final Result   No acute cardiopulmonary disease. No results found.         PROCEDURES   Unless otherwise noted below, none     Procedures    CRITICAL CARE TIME CONSULTS:  IP CONSULT TO CARDIOLOGY      EMERGENCY DEPARTMENT COURSE and DIFFERENTIAL DIAGNOSIS/MDM:   Vitals:    Vitals:    07/29/22 0757   BP: (!) 162/89   Pulse: 80   Resp: 16   Temp: 97.6 °F (36.4 °C)   SpO2: 98%       Patient was given the following medications:  Medications   aspirin chewable tablet 243 mg (243 mg Oral Given 7/29/22 0826)         Is this patient to be included in the SEP-1 Core Measure due to severe sepsis or septic shock? No   Exclusion criteria - the patient is NOT to be included for SEP-1 Core Measure due to: Infection is not suspected    CAD Hx:  LHC 10/10  11/16 40% PCI LAD and RCA ([facility] [place])  PCI of LAD/kissing balloon diagonal with 1 KARLY Cheyenne Sanchez)   MPI 10/15 71% Small apical infarct, no ischemia   TTE 1/14 55% Apical anterior hk       Patient is a 32-year-old male who presents to the ED with complaint of chest discomfort. Intermittent exertional chest discomfort and shortness of breath the left-sided chest for the past couple months. Appears consistent with unstable angina. Seen by cardiology yesterday in the office and told to come to the emergency department today for continued symptoms. Cardiology consulted and states they know about patient and are planning on taking patient to Cath Lab today for further evaluation. EKG inter by attending. Chest x-ray unremarkable. Did order lab work. Lab work is currently pending. They requested hospitalist admission for further evaluation and plan to cath this morning. Patient's previous cardiac work-up as above. Patient states pain 1.5/10 at this time. He took 1 baby aspirin this morning given another 3 baby aspirin at this time. Do not believe further imaging or work-up indicated at this time. Believe patient would benefit from admission per cardiology recommendations for Cath Lab and admission to hospital service. Case discussed with hospital service for admission. FINAL IMPRESSION      1.  Chest discomfort

## 2022-07-29 NOTE — FLOWSHEET NOTE
07/29/22 1459   Vital Signs   Temp 97.4 °F (36.3 °C)   Temp Source Oral   Heart Rate 74   Heart Rate Source Monitor   Resp 18   /74   BP Location Left upper arm   MAP (Calculated) 86.33   Level of Consciousness Alert (0)   MEWS Score 1   Pain Assessment   Pain Assessment None - Denies Pain   Pain Level 0   Oxygen Therapy   SpO2 97 %   O2 Device None (Room air)   RUE Neurovascular Assessment   R Radial Pulse +2 (Moderate)   Puncture Site Assessment 1   Location Radial - right   Site Assessment No redness, drainage, swelling or hematoma   Hemostasis Intervention Radial Compression Device   Returned from cath lab. Awake and alert.

## 2022-07-29 NOTE — CONSULTS
Cardiothoracic Surgery Consultation           PCP: Mahsa REYES    Referring Physician: Dr. Genevieve Cerna    DIAGNOSIS:  Severe 3 Vessel CAD    CHIEF COMPLAINT:  Chest pain    History Obtained From:  patient, electronic medical record    HISTORY OF PRESENT ILLNESS:      The patient is a 47 y.o. male with significant past medical history of HTN, CAD w/ coronary angioplasty with 4x stents (2010 and 2016), and Mitral incompetence, GERD, Peptic Ulcer disease, Gracia's Esophagus, Plaque Psoriasis, neck surgery, ankle surgery, and appendectomy who presented to the Emergency Department on 7/29/22 with intermittent left sided chest pain that radiated to his left neck and shoulder. This pain was alleviated with rest.  Endorses shortness of breath with exertion. Patient saw Dr. Genevieve Cerna the day prior to admission in the office and was instructed to go to the emergency department if symptoms continued for evaluation and heart cath. Left heart angiogram found severe multivessel coronary artery disease. CVTS was consulted for surgical evaluation for coronary artery bypass grafting. Patient is currently hemodynamically stable and denying any complaints of chest pain or shortness of breath. Patient is a current smoker. Patient has not been vaccinated for covid. Past Medical History:        Diagnosis Date    Hypertension     MI (mitral incompetence)        Past Surgical History:        Procedure Laterality Date    APPENDECTOMY      BACK SURGERY      CORONARY ANGIOPLASTY WITH STENT PLACEMENT      LASIK  11/15/2020    NASAL SEPTUM SURGERY  11/2014       Medications:   Home Meds:   Prior to Admission medications    Medication Sig Start Date End Date Taking? Authorizing Provider   pantoprazole (PROTONIX) 40 MG tablet Take 40 mg by mouth daily 2/8/22   Historical Provider, MD   clonazePAM (KLONOPIN) 0.5 MG tablet Take 0.5 mg by mouth 2 times daily as needed.     Historical Provider, MD   metoprolol tartrate (LOPRESSOR) 25 MG tablet Take 25 mg by mouth in the morning and 25 mg before bedtime. Pt takes 5 mg daily.     Historical Provider, MD   Acetylcysteine (NAC PO) Take by mouth    Historical Provider, MD   aspirin 81 MG EC tablet Take 1 tablet by mouth daily 8/21/17   Tan Gibbs MD      Scheduled Meds:    aspirin EC  81 mg Oral Daily    metoprolol tartrate  25 mg Oral BID    pantoprazole  40 mg Oral Daily    sodium chloride flush  5-40 mL IntraVENous 2 times per day    enoxaparin  30 mg SubCUTAneous BID     Continuous Infusions:    sodium chloride         Current Facility-Administered Medications   Medication Dose Route Frequency Provider Last Rate Last Admin    aspirin EC tablet 81 mg  81 mg Oral Daily Yuridia Barreto MD        clonazePAM (KLONOPIN) tablet 0.5 mg  0.5 mg Oral BID PRN Magalys Maurer MD        metoprolol tartrate (LOPRESSOR) tablet 25 mg  25 mg Oral BID Magalys Maurer MD        pantoprazole (PROTONIX) tablet 40 mg  40 mg Oral Daily Yuridia Barreto MD        sodium chloride flush 0.9 % injection 5-40 mL  5-40 mL IntraVENous 2 times per day Magalys Maurer MD        sodium chloride flush 0.9 % injection 5-40 mL  5-40 mL IntraVENous PRN Magalys Maurer MD        0.9 % sodium chloride infusion   IntraVENous PRN Magalys Maurer MD        ondansetron (ZOFRAN-ODT) disintegrating tablet 4 mg  4 mg Oral Q8H PRN Magalys Maurer MD        Or    ondansetron (ZOFRAN) injection 4 mg  4 mg IntraVENous Q6H PRN Yuridia Barreto MD        polyethylene glycol (GLYCOLAX) packet 17 g  17 g Oral Daily PRN Magalys Maurer MD        acetaminophen (TYLENOL) tablet 650 mg  650 mg Oral Q6H PRN Yuridia Barreto MD        Or    acetaminophen (TYLENOL) suppository 650 mg  650 mg Rectal Q6H PRN Yuridia Barreto MD        morphine (PF) injection 2 mg  2 mg IntraVENous Q4H PRN Yuridia Barreto MD        enoxaparin Sodium (LOVENOX) injection 30 mg  30 mg SubCUTAneous BID Yuridia Barreto MD           Allergies:  Fenofibrate and Statins    Social History:    TOBACCO: reports that he quit smoking about 3 weeks ago. His smoking use included cigarettes. He smoked an average of .25 packs per day. He has never used smokeless tobacco.  ETOH:   reports no history of alcohol use. DRUGS:   reports current drug use. Drug: Marijuana [employer]).   LIFESTYLE: Active    MARITAL STATUS:   OCCUPATION:        Family History:        Problem Relation Age of Onset    High Blood Pressure Mother     High Cholesterol Mother     Diabetes Mother     Heart Disease Father     Diabetes Father     High Blood Pressure Father     High Cholesterol Father        REVIEW OF SYSTEMS:    CONSTITUTIONAL:  fatigue  DERMATOLOGICAL: negative  NEUROLOGICAL:  negative  EYES:  positive for  eye surgery  HEENT:  positive for  neck surgery  RESPIRATORY:  positive for  dyspnea  CARDIOVASCULAR:  positive for  chest pain  GASTROINTESTINAL:  positive for GERD  GENITO-URINARY: negative  ENDOCRINE: negative  MUSCULOSKELETAL:  positive for ankle surgery  HEMATOLOGICAL AND LYMPHATIC: negative  IMMUNOLOGICAL: negative  PSYCHOLOGICAL: negative    PHYSICAL EXAM:    VITALS:  /86   Pulse 69   Temp 97.5 °F (36.4 °C) (Oral)   Resp 18   Ht 6' 1\" (1.854 m)   Wt 213 lb 9.6 oz (96.9 kg)   SpO2 97%   BMI 28.18 kg/m²     Eyes:  lids and lashes normal, pupils equal and round, extra ocular muscles intact, sclera clear, conjunctiva normal    Head/ENT:  Normocephalic, atraumatic, residual dentition, normal gums, & palate, oropharynx without erythema or exudates    Neck:  supple, symmetrical, trachea midline, no lymphadenopathy, no jugular venous distension, no carotid bruits and MASSES:  no masses    Lungs:  no increased work of breathing, good air exchange, no retractions and clear to auscultation, no palpable / percussible abnormalities    Cardiovascular:  regular rate and rhythm, S1, S2 normal, no murmur, click, rub or gallop    Pulses:     carotid brachial radial femoral popliteal DP PT   RIGHT 2+ 2+ 2+ 2+ 2+ 2+ 2+ LEFT 2+ 2+ 2+ 2+ 2+ 2+ 2+     Abdomen:  Soft, normal bowel sounds, non-tender, no hepatosplenomegaly, aorta likely normal and bruits absent    Musculoskeletal:  Back is straight and non-tender,  No CVAT, full ROM of upper and lower extremities. Extremities:   No clubbing, or cyanosis, or edema     Skin:Sebaceous Cyst noted on middle of sternum, no erythema, or discharge noted. Patient states he has had this cyst for 3 years without issues. Plaque psoriasis noted on bilateral arms , hands, legs and feet. warm and normal turgor, no ulcers, infections, or rashes.     Neurological: awake, alert and oriented x 3, motor 5/5 bilateral upper and lower extremities, sensation grossly intact    Psychiatric: Mood and affect appear appropriate    LABS:  BMP:   Lab Results   Component Value Date/Time     2022 08:21 AM    K 3.7 2022 08:21 AM    CL 99 2022 08:21 AM    CO2 27 2022 08:21 AM    BUN 14 2022 08:21 AM    CREATININE 0.9 2022 08:21 AM      CBC:   Lab Results   Component Value Date/Time    WBC 6.2 2022 02:35 PM    HGB 13.9 2022 02:35 PM    HCT 40.5 2022 02:35 PM    MCV 90.0 2022 02:35 PM     2022 02:35 PM      Coagulation:   Lab Results   Component Value Date/Time    INR 1.08 2012 04:52 PM     Cardiac markers:   Lab Results   Component Value Date/Time    TROPONINI <0.01 2022 08:21 AM     Hepatic Profile:  Lab Results   Component Value Date/Time    BILITOT 0.8 2022 08:21 AM    AST 25 2022 08:21 AM    ALT 46 2022 08:21 AM    ALKPHOS 126 2022 08:21 AM      Cholesterol:  Lab Results   Component Value Date/Time    CHOL 180 2016 06:36 AM    HDL 22 2016 06:36 AM    LDLCALC 107 2016 06:36 AM    TRIG 256 2016 06:36 AM        TESTING/IMAGING  EK22  Normal sinus rhythm  Low voltage QRS  Septal infarct , age undetermined  Inferior infarct , age undetermined    ANGIOGRAM: 7/29/22  Findings:  Artery Findings/Result   LM 50% distal, eccentric   LAD 70% prox, patent mid stents, 60% mid   Cx OM1 70% ostial, OM3 100% with L-L collaterals   RI 50% mid   RCA 95% prox, 40% instent prox, 50% mid   LVEDP 18   LVG 65%       ECHO/TYLER: Pending     CT CHEST W/O CONTRAST: Pending    CXRAY:  7/29/22  Impression:  No acute cardiopulmonary disease. CAROTIDS: Pending     PFTS: Pending    [id number] Score for Atrial Fibrillation Stroke Risk   Risk   Factors  Component Value   C CHF No 0   H HTN Yes 1   A2 Age >= 76 No,  (55 y.o.) 0   D DM No 0   S2 Prior Stroke/TIA No 0   V Vascular Disease No 0   A Age 74-69 No,  (55 y.o.) 0   Sc Sex male 0    [id number]  Score  1   Score last updated 7/29/22 5:80 PM EDT    Click here for a link to the UpToDate guideline \"Atrial Fibrillation: Anticoagulation therapy to prevent embolization    Disclaimer: Risk Score calculation is dependent on accuracy of patient problem list and past encounter diagnosis. CTS Adult Cardiac Risk: CABG: Pending PFT's, CT Chest w/o contrast, Echo, Vein Mapping, Carotids  Mortality Risk: 0.447%  Renal Failure: 0.462%  Permanent Stroke: 0.403%  Prolonged Ventilation: 2.962%  DSW Infection: 0.136%  Reoperation: 1.330%  Morbidity and Mortality: 4.809%  Short Length of Stay: 72.405%  Long Length of Stay: 1.224%      ASSESSMENT AND PLAN:      Dr. Chip Maza has discussed the option for coronary artery bypass grafting/left atrial appendage ligation with the patient. Will obtain additional testing (carotid duplex, PFTs, vein mapping) to further evaluate if he is an appropriate candidate for CABG/DEJAH ligation. If decision is made to proceed with surgery, tentative plan for OR on Monday 8/1/22 at 0730 w/ Dr. Chip Maza. Continue with medical optimization in the interim. Thank you Dr. Jennifer Lucero for the consultation.        Electronically signed by Zane Perez PA-C on 7/29/2022 at 2:52 PM       Attending Supervising Physicians Attestation Statement  The patient met the criteria for direct supervision. I saw and examined the patient and discussed the findings and plans with the physician assistant and agree as documented in his note . 46 y/o active smoker with h/o CAD s/p PCIs in the past presented with [place] and was found on cath to have severe 3V CAD. I discussed with the patient treatment options of his severe three-vessel coronary artery disease including doing nothing and the consequences of that versus medical therapy versus high risk multiple PCI versus urgent surgical revascularization with CABG with concomitant DEJAH clip. Best treatment option is CABG. Patient is amenable to this plan. We will proceed with standard preoperative work-up and risk stratification and tentatively plan for CABG surgery on Monday 8/1/2022 at 7:30am    He understands that with urgent CABG/DEJAH clip/Pre sternal sebaceous cyst removal surgery he has risks including but not limited to bleeding that might require transfusions and/or reexploration, infection, irregular heartbeat, heart block that might require a permanent pacemaker, wound dehiscence, sternal dehiscence with possible need for reconstructive surgery and multiple surgical scars, chronic chest wall pain syndrome and/or chest wall numbness/tingling, brachial plexus and/or peripheral neuropathy, stroke with possible permanent deficit, respiratory failure with possible need for prolonged mechanical ventilation and/or tracheostomy, acute kidney injury with possible need for dialysis, intestinal organ malperfusion and possible limb ischemia and limb loss, postoperative low cardiac output syndrome that might require mechanical circulatory support, heart attack, bypass graft failure that might require another percutaneous intervention and/or repeat bypass surgery, and even death with an operation. Pertinent questions were asked and answered.   The patient was counseled at length about the risks of shannon Covid-19 during their perioperative period and any recovery window from their procedure. The patient was made aware that shannon Covid-19  may worsen their prognosis for recovering from their procedure  and lend to a higher morbidity and/or mortality risk. All material risks, benefits, and reasonable alternatives including postponing the procedure were discussed. The patient does wish to proceed with the procedure at this time. Dr. Jass Hernández, thank you very much for allowing us to participate in the care of this patient.

## 2022-07-29 NOTE — PRE SEDATION
analgesia and maximize the potential amnesia. Patient to meet pre-procedure discharge plan.      Medication Planned:  Midazolam intravenously and fentanyl intravenously     Patient is an appropriate candidate for plan of sedation:   Yes    Electronically signed by Ayanna Blake MD on 7/29/2022 at 1:54 PM

## 2022-07-29 NOTE — ED NOTES
Report given to Ashly Storey, [facility]. No questions. Patient stable at time of transfer.       Digna Ybarra RN  07/29/22 [id number]

## 2022-07-29 NOTE — H&P
HOSPITALISTS HISTORY AND PHYSICAL    7/29/2022 10:29 AM    Patient Information:  Adela Ma is a 47 y.o. male [id number]  PCP:  Eleno REYES (Tel: [phone] )    Chief complaint:    Chief Complaint   Patient presents with    Chest Pain     Patient states CP x2 month, intermittent, states pain depends \"on how worked up I get\" saw Juliette Evans and was told if pain continues to come to ER        History of Present Illness:  Keith Jimenez is a 47 y.o. male who presented with chest pain with intermittent. Onset about 2 months ago that is progressively getting worse patient has been having symptoms of exertional pain at times at rest.  Saw cardiology in the office he was sent here for further evaluation and possible cath. Patient with history of CAD. Denies any nausea vomiting no chills no fevers  REVIEW OF SYSTEMS:   Constitutional: Negative for fever,chills or night sweats  ENT: Negative for rhinorrhea, epistaxis, hoarseness, sore throat. Respiratory: Negative for shortness of breath,wheezing  Cardiovascular: Negative for chest pain, palpitations   Gastrointestinal: Negative for nausea, vomiting, diarrhea  Genitourinary: Negative for polyuria, dysuria   Hematologic/Lymphatic: Negative for bleeding tendency, easy bruising  Musculoskeletal: Negative for myalgias and arthralgias  Neurologic: Negative for confusion,dysarthria. Skin: Negative for itching,rash, good capillary refill. Psychiatric: Negative for depression,anxiety, agitation. Endocrine: Negative for polydipsia,polyuria,heat /cold intolerance. Past Medical History:   has a past medical history of Hypertension and MI (mitral incompetence). Past Surgical History:   has a past surgical history that includes Coronary angioplasty with stent; Appendectomy; Nasal septum surgery (11/2014); and LASIK (11/15/2020).      Medications:  No current facility-administered medications on file prior to encounter. Current Outpatient Medications on File Prior to Encounter   Medication Sig Dispense Refill    pantoprazole (PROTONIX) 40 MG tablet Take 40 mg by mouth daily      clonazePAM (KLONOPIN) 0.5 MG tablet Take 0.5 mg by mouth 2 times daily as needed. metoprolol tartrate (LOPRESSOR) 25 MG tablet Take 25 mg by mouth in the morning and 25 mg before bedtime. Pt takes 5 mg daily. Acetylcysteine (NAC PO) Take by mouth      aspirin 81 MG EC tablet Take 1 tablet by mouth daily 30 tablet 11       Allergies: Allergies   Allergen Reactions    Fenofibrate     Statins      Crestor, pravachol        Social History:   reports that he has been smoking cigarettes. He has been smoking an average of .25 packs per day. He has never used smokeless tobacco. He reports that he does not drink alcohol and does not use drugs. Family History:  family history includes Diabetes in his father and mother; Heart Disease in his father; High Blood Pressure in his father and mother; High Cholesterol in his father and mother. ,     Physical Exam:  BP (!) 142/88   Pulse 76   Temp 97.6 °F (36.4 °C)   Resp 16   SpO2 99%     General appearance:  Appears comfortable. Well nourished  Eyes: Sclera clear, pupils equal  ENT: Moist mucus membranes, no thrush. Trachea midline. Cardiovascular: Regular rhythm, normal S1, S2. No murmur, gallop, rub. No edema in lower extremities  Respiratory: Clear to auscultation bilaterally, no wheeze, good inspiratory effort  Gastrointestinal: Abdomen soft, non-tender, not distended, normal bowel sounds  Musculoskeletal: No cyanosis in digits, neck supple  Neurology: Cranial nerves grossly intact. Alert and oriented in time, place and person. No speech or motor deficits  Psychiatry: Appropriate affect.  Not agitated  Skin: Warm, dry, normal turgor, no rash    Labs:  CBC:   Lab Results   Component Value Date/Time    WBC 6.9 07/29/2022 08:21 AM    RBC 5.12 07/29/2022 08:21 AM    HGB 16.1 07/29/2022 08:21 AM    HCT 46.1 07/29/2022 08:21 AM    MCV 90.0 07/29/2022 08:21 AM    MCH 31.4 07/29/2022 08:21 AM    MCHC 34.9 07/29/2022 08:21 AM    RDW 13.6 07/29/2022 08:21 AM     07/29/2022 08:21 AM    MPV 8.1 07/29/2022 08:21 AM     BMP:    Lab Results   Component Value Date/Time     07/29/2022 08:21 AM    K 3.7 07/29/2022 08:21 AM    CL 99 07/29/2022 08:21 AM    CO2 27 07/29/2022 08:21 AM    BUN 14 07/29/2022 08:21 AM    CREATININE 0.9 07/29/2022 08:21 AM    CALCIUM 10.2 07/29/2022 08:21 AM    GFRAA >60 07/29/2022 08:21 AM    GFRAA >60 06/23/2012 05:25 AM    LABGLOM >60 07/29/2022 08:21 AM    GLUCOSE 110 07/29/2022 08:21 AM       Chest Xray:   EKG:    I visualized CXR images and EKG strips     Discussed  with      Problem List  Principal Problem:    Chest pain  Resolved Problems:    * No resolved hospital problems. *        Assessment/Plan:     Chest pain with concern for stable angina  -From cardiology office initial EKG no acute change  -And for cardiac cath today.   N.p.o. for now we will follow-up post cath      Monica Mills MD    7/29/2022 10:29 AM

## 2022-07-29 NOTE — PROGRESS NOTES
Pharmacist Review and Automatic Dose Adjustment of Prophylactic Enoxaparin        The reviewing pharmacist has made an adjustment to the ordered enoxaparin dose or converted to UFH per the approved [facility] protocol and table as identified below. Magan Ferraro is a 47 y.o. male. Recent Labs     07/29/22  0821   CREATININE 0.9       Estimated Creatinine Clearance: 121 mL/min (based on SCr of 0.9 mg/dL). Recent Labs     07/29/22  0821   HGB 16.1   HCT 46.1        No results for input(s): INR in the last 72 hours. Height:   Ht Readings from Last 1 Encounters:   07/28/22 6' 1\" (1.854 m)     Weight:  Wt Readings from Last 1 Encounters:   07/28/22 222 lb 12.8 oz (101.1 kg)               Plan: Based upon the patient's weight and renal function, the ordered enoxaparin dose of 40 mg daily has been changed/converted to 30 mg twice daily.       Thank you,  Brandee Thomas, [facility]  7/29/2022, 10:36 AM

## 2022-07-29 NOTE — OP NOTE
[address]  Procedure Note    Procedure: [facility]  Indication: UA    Procedure Details:  Consent Access Bleed R Sedat Start Stop Versed Fentanyl Contrast Fluoro EBL Comp Spec   Yes RRA int MCSFC 1411 1438 6 200 46 2.6 <20 None None   US guidance used to determine aforementioned artery patency, size (>2mm), anatomic variations and ideal puncture location. Real-time US utilized concurrent with vascular needle entry into artery. Image(s) permanently recorded and reported in chart. Independent trained observer pushed medications at my direction. We monitored the patient's level of consciousness and vital   signs/physiologic status throughout the procedure duration (see start and stop times above, as well as medication dosages).     Findings:  Artery Findings/Result   LM 50% distal, eccentric   LAD 70% prox, patent mid stents, 60% mid   Cx OM1 70% ostial, OM3 100% with L-L collaterals   RI 50% mid   RCA 95% prox, 40% instent prox, 50% mid   LVEDP 18   LVG 65%     Intervention:   None    Post Cath Dx:   Admit to hospital  CABG - consider LAD, OM1, OM3, PDA +/- ramus although appears small

## 2022-07-30 LAB
ANION GAP SERPL CALCULATED.3IONS-SCNC: 9 MMOL/L (ref 3–16)
BASOPHILS ABSOLUTE: 0.1 K/UL (ref 0–0.2)
BASOPHILS RELATIVE PERCENT: 1.7 %
BUN BLDV-MCNC: 13 MG/DL (ref 7–20)
CALCIUM SERPL-MCNC: 9.3 MG/DL (ref 8.3–10.6)
CHLORIDE BLD-SCNC: 103 MMOL/L (ref 99–110)
CHOLESTEROL, TOTAL: 182 MG/DL (ref 0–199)
CO2: 25 MMOL/L (ref 21–32)
CREAT SERPL-MCNC: 1 MG/DL (ref 0.9–1.3)
EOSINOPHILS ABSOLUTE: 0.3 K/UL (ref 0–0.6)
EOSINOPHILS RELATIVE PERCENT: 5.8 %
ESTIMATED AVERAGE GLUCOSE: 111.2 MG/DL
GFR AFRICAN AMERICAN: >60
GFR NON-AFRICAN AMERICAN: >60
GLUCOSE BLD-MCNC: 99 MG/DL (ref 70–99)
HBA1C MFR BLD: 5.5 %
HCT VFR BLD CALC: 43.6 % (ref 40.5–52.5)
HDLC SERPL-MCNC: 18 MG/DL (ref 40–60)
HEMOGLOBIN: 15 G/DL (ref 13.5–17.5)
LDL CHOLESTEROL CALCULATED: ABNORMAL MG/DL
LDL CHOLESTEROL DIRECT: 108 MG/DL
LV EF: 45 %
LVEF MODALITY: NORMAL
LYMPHOCYTES ABSOLUTE: 1.8 K/UL (ref 1–5.1)
LYMPHOCYTES RELATIVE PERCENT: 32.4 %
MCH RBC QN AUTO: 31.4 PG (ref 26–34)
MCHC RBC AUTO-ENTMCNC: 34.5 G/DL (ref 31–36)
MCV RBC AUTO: 90.9 FL (ref 80–100)
MONOCYTES ABSOLUTE: 0.6 K/UL (ref 0–1.3)
MONOCYTES RELATIVE PERCENT: 10.4 %
NEUTROPHILS ABSOLUTE: 2.8 K/UL (ref 1.7–7.7)
NEUTROPHILS RELATIVE PERCENT: 49.7 %
PDW BLD-RTO: 13.3 % (ref 12.4–15.4)
PLATELET # BLD: 173 K/UL (ref 135–450)
PMV BLD AUTO: 7.6 FL (ref 5–10.5)
POTASSIUM REFLEX MAGNESIUM: 3.9 MMOL/L (ref 3.5–5.1)
RBC # BLD: 4.79 M/UL (ref 4.2–5.9)
SODIUM BLD-SCNC: 137 MMOL/L (ref 136–145)
TRIGL SERPL-MCNC: 479 MG/DL (ref 0–150)
VLDLC SERPL CALC-MCNC: ABNORMAL MG/DL
WBC # BLD: 5.6 K/UL (ref 4–11)

## 2022-07-30 PROCEDURE — 6370000000 HC RX 637 (ALT 250 FOR IP): Performed by: HOSPITALIST

## 2022-07-30 PROCEDURE — 93970 EXTREMITY STUDY: CPT

## 2022-07-30 PROCEDURE — 2580000003 HC RX 258: Performed by: HOSPITALIST

## 2022-07-30 PROCEDURE — 6360000002 HC RX W HCPCS: Performed by: HOSPITALIST

## 2022-07-30 PROCEDURE — 36415 COLL VENOUS BLD VENIPUNCTURE: CPT

## 2022-07-30 PROCEDURE — C8929 TTE W OR WO FOL WCON,DOPPLER: HCPCS

## 2022-07-30 PROCEDURE — 80048 BASIC METABOLIC PNL TOTAL CA: CPT

## 2022-07-30 PROCEDURE — 6360000004 HC RX CONTRAST MEDICATION

## 2022-07-30 PROCEDURE — 85025 COMPLETE CBC W/AUTO DIFF WBC: CPT

## 2022-07-30 PROCEDURE — 1200000000 HC SEMI PRIVATE

## 2022-07-30 PROCEDURE — 93971 EXTREMITY STUDY: CPT

## 2022-07-30 PROCEDURE — 93880 EXTRACRANIAL BILAT STUDY: CPT

## 2022-07-30 PROCEDURE — 99233 SBSQ HOSP IP/OBS HIGH 50: CPT | Performed by: NURSE PRACTITIONER

## 2022-07-30 RX ORDER — LANOLIN ALCOHOL/MO/W.PET/CERES
3 CREAM (GRAM) TOPICAL DAILY PRN
Status: DISCONTINUED | OUTPATIENT
Start: 2022-07-30 | End: 2022-08-05 | Stop reason: HOSPADM

## 2022-07-30 RX ORDER — DIAZEPAM 5 MG/1
5 TABLET ORAL EVERY 6 HOURS PRN
Status: DISCONTINUED | OUTPATIENT
Start: 2022-07-30 | End: 2022-08-05 | Stop reason: HOSPADM

## 2022-07-30 RX ADMIN — ENOXAPARIN SODIUM 30 MG: 100 INJECTION SUBCUTANEOUS at 20:21

## 2022-07-30 RX ADMIN — DIAZEPAM 5 MG: 5 TABLET ORAL at 08:57

## 2022-07-30 RX ADMIN — ASPIRIN 81 MG: 81 TABLET, COATED ORAL at 08:48

## 2022-07-30 RX ADMIN — CLONAZEPAM 0.5 MG: 1 TABLET ORAL at 20:21

## 2022-07-30 RX ADMIN — Medication 10 ML: at 08:48

## 2022-07-30 RX ADMIN — METOPROLOL TARTRATE 25 MG: 25 TABLET, FILM COATED ORAL at 08:49

## 2022-07-30 RX ADMIN — Medication 10 ML: at 20:21

## 2022-07-30 RX ADMIN — MELATONIN TAB 3 MG 3 MG: 3 TAB at 23:43

## 2022-07-30 RX ADMIN — DIAZEPAM 5 MG: 5 TABLET ORAL at 17:42

## 2022-07-30 RX ADMIN — ENOXAPARIN SODIUM 30 MG: 100 INJECTION SUBCUTANEOUS at 08:48

## 2022-07-30 RX ADMIN — DIAZEPAM 5 MG: 5 TABLET ORAL at 23:43

## 2022-07-30 RX ADMIN — PANTOPRAZOLE SODIUM 40 MG: 40 TABLET, DELAYED RELEASE ORAL at 08:48

## 2022-07-30 RX ADMIN — METOPROLOL TARTRATE 25 MG: 25 TABLET, FILM COATED ORAL at 20:21

## 2022-07-30 RX ADMIN — PERFLUTREN 1.65 MG: 6.52 INJECTION, SUSPENSION INTRAVENOUS at 10:45

## 2022-07-30 ASSESSMENT — PAIN SCALES - GENERAL
PAINLEVEL_OUTOF10: 0

## 2022-07-30 NOTE — PROGRESS NOTES
[address]   Cardiology Progress Note   Date: 7/30/2022  Admit Date: 7/29/2022     Reason for follow up: CP, CAD    Chief Complaint:   Chief Complaint   Patient presents with    Chest Pain     Patient states CP x2 month, intermittent, states pain depends \"on how worked up I get\" saw Romina Schafer and was told if pain continues to come to ER     History of Present Illness: History obtained from patient and medical record. Cele Stoner is a 47 y.o. male with a past medical history of hypertension, hutchinson's esophagus, mitral incompetence, CAD s/p stents x 4 (2010 and 2016), current smoker with nicotine and marijuana use, who presented with CP s/p LHC showed MVD and CTS was consulted for CABG. Interval Hx: Today, he is being seen for follow up. Plans for CABG on Monday. He is feeling very nervous. Reports that he takes klonopin and uses marijuana at home to treat his anxiety. Echo today. He would like to shower. No new complaints today. No major events overnight. Denies having chest pain, palpitations, shortness of breath, or dizziness. Patient seen and examined. Clinical notes reviewed. Telemetry reviewed.     Assessment and Plan:  Chest Pain   - No recurrence overnight  MVCAD   - Per [facility]   - Plans for CABG on MOnday    - No reports of angina overnight, heparin gtt if angina recurrence  Hypertension   - Controlled  Nicotine dependence, Marijuana use   - Discussed complete cessation   - He has been trying to quit   HLD   -    - Needs high intensity statin, multiple statin intolerances, consider Lipitor    - Avoid shower, he needs to remain on telemetry, he can wash up as tolerated  - Dicussed additional anti-anxiety medications with primary team  - Heparin gtt if chest pain develops  - Plans for CABG Monday per CTS, echo today     All pertinent information and plan of care discussed with the rounding/attending cardiologist.    Multiple medical conditions with risk of decompensation. All questions and concerns were addressed to the patient. Alternatives to my treatment were discussed. I have discussed the above stated plan with patient and the nurse. The patient verbalized understanding and agreed with the plan. Thank you for allowing to us to participate in the care of Barb Santiago. .    Problem List:   Patient Active Problem List    Diagnosis Date Noted    Essential hypertension 12/17/2015    Mixed hyperlipidemia 12/17/2015    Chest pain 07/29/2022    Coronary artery disease involving native coronary artery of native heart without angina pectoris 12/17/2015    Coronary artery disease due to lipid rich plaque 05/06/2020    Hypercholesteremia 09/07/2018    Tobacco abuse 09/07/2018    Unstable angina ([address] 06/22/2012      Allergies: Allergies   Allergen Reactions    Fenofibrate     Statins      Crestor, pravachol       Home Meds:  Prior to Visit Medications    Medication Sig Taking? Authorizing Provider   pantoprazole (PROTONIX) 40 MG tablet Take 40 mg by mouth daily  Historical Provider, MD   clonazePAM (KLONOPIN) 0.5 MG tablet Take 0.5 mg by mouth 2 times daily as needed. Historical Provider, MD   metoprolol tartrate (LOPRESSOR) 25 MG tablet Take 25 mg by mouth in the morning and 25 mg before bedtime. Pt takes 5 mg daily.   Historical Provider, MD   Acetylcysteine (NAC PO) Take by mouth  Historical Provider, MD   aspirin 81 MG EC tablet Take 1 tablet by mouth daily  Chester Tejada MD      Scheduled Meds:   aspirin EC  81 mg Oral Daily    metoprolol tartrate  25 mg Oral BID    pantoprazole  40 mg Oral Daily    sodium chloride flush  5-40 mL IntraVENous 2 times per day    enoxaparin  30 mg SubCUTAneous BID    nicotine  1 patch TransDERmal Daily     Continuous Infusions:   sodium chloride       PRN Meds:clonazePAM, sodium chloride flush, sodium chloride, ondansetron **OR** ondansetron, polyethylene glycol, morphine, acetaminophen, perflutren lipid microspheres Past Medical History:  Past Medical History:   Diagnosis Date    Hypertension     MI (mitral incompetence)         Past Surgical History:    has a past surgical history that includes Coronary angioplasty with stent; Appendectomy; Nasal septum surgery (11/2014); LASIK (11/15/2020); and back surgery. Social History:  Reviewed. reports that he quit smoking about 3 weeks ago. His smoking use included cigarettes. He smoked an average of .25 packs per day. He has never used smokeless tobacco. He reports current drug use. Drug: Marijuana Charmayne Stakylie). He reports that he does not drink alcohol. Family History:  Reviewed. family history includes Diabetes in his father and mother; Heart Disease in his father; High Blood Pressure in his father and mother; High Cholesterol in his father and mother. Denies family history of sudden cardiac death, arrhythmia, premature CAD    Review of Systems:  Constitutional: Negative for fever, weight changes, or weakness  Skin: Negative for bruising, bleeding, blood clots, or changes in skin pigment  HEENT: Negative for vision changes or dysphagia  Respiratory: Reviewed in HPI  Cardiovascular: Reviewed in HPI  Gastrointestinal: Negative for abdominal pain or black/tarry stools  Genito-Urinary: Negative for hematuria  Musculoskeletal: No focal weakness  Neurological/Psych: Negative for confusion or TIA-like symptoms.       Physical Examination:  Vitals:    07/30/22 0532   BP: 103/71   Pulse: 66   Resp: 16   Temp: 97.5 °F (36.4 °C)   SpO2: 96%      In: 600 [P.O.:600]  Out: 200    Wt Readings from Last 3 Encounters:   07/30/22 209 lb 14.4 oz (95.2 kg)   07/28/22 222 lb 12.8 oz (101.1 kg)   07/14/22 204 lb (92.5 kg)       Intake/Output Summary (Last 24 hours) at 7/30/2022 0740  Last data filed at 7/29/2022 2017  Gross per 24 hour   Intake 600 ml   Output 200 ml   Net 400 ml     Telemetry: Personally Reviewed Normal sinus rhythm  Constitutional: Cooperative and in no apparent distress, and appears well nourished  Skin: Warm and pink; no cyanosis, bruising, or clubbing  HEENT: Symmetric and normocephalic. Conjunctiva pink with clear sclera. Mucus membranes pink and moist.   Cardiovascular: regular and rhythm. S1 & S2, positive for murmurs. Peripheral pulses 2+, capillary refill < 3 seconds. positive elevation of JVP. Respiratory: Respirations symmetric and unlabored. Lungs clear to auscultation bilaterally, no wheezing, crackles, or rhonchi  Gastrointestinal: Abdomen soft and round. Bowel sounds normoactive in all quadrants. Musculoskeletal: No focal weakness. Neurologic/Psych: Awake and orientated to person, place and time. Calm affect, appropriate mood    Pertinent labs, diagnostic, device, and imaging results reviewed as a part of this visit    Labs:    BMP:   Recent Labs     07/29/22  0821 07/29/22  1435 07/30/22  0538    138 137   K 3.7 3.4* 3.9   CL 99 102 103   CO2 27 25 25   BUN 14 13 13   CREATININE 0.9 1.0 1.0   MG  --  2.10  --      Estimated Creatinine Clearance: 99 mL/min (based on SCr of 1 mg/dL).    CBC:   Recent Labs     07/29/22  0821 07/29/22  1435 07/30/22  0538   WBC 6.9 6.2 5.6   HGB 16.1 13.9 15.0   HCT 46.1 40.5 43.6   MCV 90.0 90.0 90.9    175 173     Thyroid: No results found for: TSH, U6ETZZA, X5SUOQI, THYROIDAB  Lipids:   Lab Results   Component Value Date/Time    CHOL 182 07/29/2022 02:35 PM    HDL 18 07/29/2022 02:35 PM    TRIG 479 07/29/2022 02:35 PM     LFTS:   Lab Results   Component Value Date/Time    ALT 38 07/29/2022 02:35 PM    AST 22 07/29/2022 02:35 PM    ALKPHOS 104 07/29/2022 02:35 PM    PROT 6.8 07/29/2022 02:35 PM    AGRATIO 1.6 07/29/2022 02:35 PM    BILITOT 0.6 07/29/2022 02:35 PM     Cardiac Enzymes:   Lab Results   Component Value Date/Time    TROPONINI <0.01 07/29/2022 08:21 AM    TROPONINI <0.01 07/14/2022 09:12 PM    TROPONINI <0.01 06/23/2012 05:25 AM    TROPONINI <0.01 06/22/2012 11:03 PM    TROPONINI <0.01 06/22/2012 04:53 PM Coags:   Lab Results   Component Value Date/Time    PROTIME 14.7 2022 02:35 PM    INR 1.16 2022 02:35 PM     EC2022\" SR, septal infarct    ECHO:  2014   Summary       Normal left ventricle size, wall thickness and systolic function with an EF    of 55%. hypokinesis of the apical anterior wall. Trivial tricuspid and mitral regurgitation.   Cath: 2022  Findings:  Artery Findings/Result   LM 50% distal, eccentric   LAD 70% prox, patent mid stents, 60% mid   Cx OM1 70% ostial, OM3 100% with L-L collaterals   RI 50% mid   RCA 95% prox, 40% instent prox, 50% mid   LVEDP 18   LVG 65%      Intervention:         None     Post Cath Dx:       Admit to hospital  CABG - consider LAD, OM1, OM3, PDA +/- ramus although appears small  Cindia Ill, APRN-CNP  [employer]   Office: [phone]

## 2022-07-30 NOTE — PROGRESS NOTES
[facility] PROGRESS NOTE    7/30/2022 2:16 PM        Name: Cassi Rojas Sr. .              Admitted: 7/29/2022  Primary Care Provider: Claudia REYES (Tel: [phone])                        Subjective:  . No acute events overnight. Resting well. Pain control. Diet ok. Labs reviewed  Denies any chest pain sob. Reviewed interval ancillary notes    Current Medications  diazePAM (VALIUM) tablet 5 mg, Q6H PRN  melatonin tablet 3 mg, Daily PRN  aspirin EC tablet 81 mg, Daily  clonazePAM (KLONOPIN) tablet 0.5 mg, BID PRN  metoprolol tartrate (LOPRESSOR) tablet 25 mg, BID  pantoprazole (PROTONIX) tablet 40 mg, Daily  sodium chloride flush 0.9 % injection 5-40 mL, 2 times per day  sodium chloride flush 0.9 % injection 5-40 mL, PRN  0.9 % sodium chloride infusion, PRN  ondansetron (ZOFRAN-ODT) disintegrating tablet 4 mg, Q8H PRN   Or  ondansetron (ZOFRAN) injection 4 mg, Q6H PRN  polyethylene glycol (GLYCOLAX) packet 17 g, Daily PRN  morphine (PF) injection 2 mg, Q4H PRN  enoxaparin Sodium (LOVENOX) injection 30 mg, BID  acetaminophen (TYLENOL) tablet 650 mg, Q4H PRN  nicotine (NICODERM CQ) 21 MG/24HR 1 patch, Daily        Objective:  /78   Pulse 73   Temp 97.7 °F (36.5 °C) (Oral)   Resp 18   Ht 6' 1\" (1.854 m)   Wt 209 lb 14.4 oz (95.2 kg)   SpO2 96%   BMI 27.69 kg/m²     Intake/Output Summary (Last 24 hours) at 7/30/2022 1416  Last data filed at 7/30/2022 0830  Gross per 24 hour   Intake 840 ml   Output 200 ml   Net 640 ml      Wt Readings from Last 3 Encounters:   07/30/22 209 lb 14.4 oz (95.2 kg)   07/28/22 222 lb 12.8 oz (101.1 kg)   07/14/22 204 lb (92.5 kg)       General appearance:  Appears comfortable  Eyes: Sclera clear. Pupils equal.  ENT: Moist oral mucosa. Trachea midline, no adenopathy. Cardiovascular: Regular rhythm, normal S1, S2. No murmur.  No edema in lower extremities  Respiratory: Not using accessory muscles. Good inspiratory effort. Clear to auscultation bilaterally, no wheeze or crackles. GI: Abdomen soft, no tenderness, not distended, normal bowel sounds  Musculoskeletal: No cyanosis in digits, neck supple  Neurology: CN 2-12 grossly intact. No speech or motor deficits  Psych: Normal affect. Alert and oriented in time, place and person  Skin: Warm, dry, normal turgor    Labs and Tests:  CBC:   Recent Labs     07/29/22  0821 07/29/22  1435 07/30/22  0538   WBC 6.9 6.2 5.6   HGB 16.1 13.9 15.0    175 173     BMP:    Recent Labs     07/29/22  0821 07/29/22  1435 07/30/22  0538    138 137   K 3.7 3.4* 3.9   CL 99 102 103   CO2 27 25 25   BUN 14 13 13   CREATININE 0.9 1.0 1.0   GLUCOSE 110* 113* 99     Hepatic:   Recent Labs     07/29/22  0821 07/29/22  1435   AST 25 22   ALT 46* 38   BILITOT 0.8 0.6   ALKPHOS 126 104       Discussed care with patient             Problem List  Principal Problem:    Chest pain  Resolved Problems:    * No resolved hospital problems. *       Assessment & Plan:   Unstable angina  S/p LHC with 3 vessels disease  - referred for CABG  - continue asa, statin       htn    Anxiety  - meds adjusted      Diet: ADULT DIET;  Regular  Code:Full Code  DVT PPX lovenox       Marisa Tello MD   7/30/2022 2:16 PM

## 2022-07-30 NOTE — PLAN OF CARE
Problem: Discharge Planning  Goal: Discharge to home or other facility with appropriate resources  Outcome: Progressing     Problem: Safety - Adult  Goal: Free from fall injury  Outcome: Progressing     Problem: Cardiovascular - Adult  Goal: Maintains optimal cardiac output and hemodynamic stability  Outcome: Progressing  Goal: Absence of cardiac dysrhythmias or at baseline  Outcome: Progressing

## 2022-07-30 NOTE — PROGRESS NOTES
CC: [place], Severe 3V CAD, HTN, HLD    No CP, NO SOB overnight  HD stable, Electrically stable    Aox3  CTAB  Abd soft    CABG work up underway    A/P: Tentative plan for urgent CABG/DEJAH clip on Monday  All questions answered

## 2022-07-31 ENCOUNTER — ANESTHESIA EVENT (OUTPATIENT)
Dept: OPERATING ROOM | Age: 52
DRG: 234 | End: 2022-07-31
Payer: COMMERCIAL

## 2022-07-31 LAB
ABO/RH: NORMAL
ANION GAP SERPL CALCULATED.3IONS-SCNC: 10 MMOL/L (ref 3–16)
ANTIBODY SCREEN: NORMAL
BUN BLDV-MCNC: 15 MG/DL (ref 7–20)
CALCIUM SERPL-MCNC: 9.5 MG/DL (ref 8.3–10.6)
CHLORIDE BLD-SCNC: 105 MMOL/L (ref 99–110)
CO2: 27 MMOL/L (ref 21–32)
CREAT SERPL-MCNC: 1 MG/DL (ref 0.9–1.3)
GFR AFRICAN AMERICAN: >60
GFR NON-AFRICAN AMERICAN: >60
GLUCOSE BLD-MCNC: 104 MG/DL (ref 70–99)
HCT VFR BLD CALC: 43.8 % (ref 40.5–52.5)
HEMOGLOBIN: 15.2 G/DL (ref 13.5–17.5)
MCH RBC QN AUTO: 31.6 PG (ref 26–34)
MCHC RBC AUTO-ENTMCNC: 34.8 G/DL (ref 31–36)
MCV RBC AUTO: 91 FL (ref 80–100)
PDW BLD-RTO: 13.3 % (ref 12.4–15.4)
PLATELET # BLD: 172 K/UL (ref 135–450)
PMV BLD AUTO: 7.8 FL (ref 5–10.5)
POTASSIUM SERPL-SCNC: 3.9 MMOL/L (ref 3.5–5.1)
RBC # BLD: 4.81 M/UL (ref 4.2–5.9)
SODIUM BLD-SCNC: 142 MMOL/L (ref 136–145)
WBC # BLD: 5.2 K/UL (ref 4–11)

## 2022-07-31 PROCEDURE — 2580000003 HC RX 258: Performed by: HOSPITALIST

## 2022-07-31 PROCEDURE — 6370000000 HC RX 637 (ALT 250 FOR IP): Performed by: INTERNAL MEDICINE

## 2022-07-31 PROCEDURE — 6370000000 HC RX 637 (ALT 250 FOR IP): Performed by: HOSPITALIST

## 2022-07-31 PROCEDURE — 86901 BLOOD TYPING SEROLOGIC RH(D): CPT

## 2022-07-31 PROCEDURE — 86850 RBC ANTIBODY SCREEN: CPT

## 2022-07-31 PROCEDURE — P9035 PLATELET PHERES LEUKOREDUCED: HCPCS

## 2022-07-31 PROCEDURE — 6360000002 HC RX W HCPCS: Performed by: HOSPITALIST

## 2022-07-31 PROCEDURE — 36415 COLL VENOUS BLD VENIPUNCTURE: CPT

## 2022-07-31 PROCEDURE — 80048 BASIC METABOLIC PNL TOTAL CA: CPT

## 2022-07-31 PROCEDURE — 99233 SBSQ HOSP IP/OBS HIGH 50: CPT | Performed by: NURSE PRACTITIONER

## 2022-07-31 PROCEDURE — 85027 COMPLETE CBC AUTOMATED: CPT

## 2022-07-31 PROCEDURE — 6370000000 HC RX 637 (ALT 250 FOR IP)

## 2022-07-31 PROCEDURE — 94150 VITAL CAPACITY TEST: CPT

## 2022-07-31 PROCEDURE — 86923 COMPATIBILITY TEST ELECTRIC: CPT

## 2022-07-31 PROCEDURE — 86900 BLOOD TYPING SEROLOGIC ABO: CPT

## 2022-07-31 PROCEDURE — 1200000000 HC SEMI PRIVATE

## 2022-07-31 RX ORDER — GABAPENTIN 300 MG/1
600 CAPSULE ORAL ONCE
Status: COMPLETED | OUTPATIENT
Start: 2022-07-31 | End: 2022-08-01

## 2022-07-31 RX ORDER — SODIUM CHLORIDE, SODIUM LACTATE, POTASSIUM CHLORIDE, CALCIUM CHLORIDE 600; 310; 30; 20 MG/100ML; MG/100ML; MG/100ML; MG/100ML
INJECTION, SOLUTION INTRAVENOUS CONTINUOUS
Status: DISCONTINUED | OUTPATIENT
Start: 2022-08-01 | End: 2022-08-02

## 2022-07-31 RX ORDER — ALPRAZOLAM 0.25 MG/1
0.25 TABLET ORAL
Status: COMPLETED | OUTPATIENT
Start: 2022-07-31 | End: 2022-07-31

## 2022-07-31 RX ORDER — CHLORHEXIDINE GLUCONATE 4 G/100ML
SOLUTION TOPICAL SEE ADMIN INSTRUCTIONS
Status: COMPLETED | OUTPATIENT
Start: 2022-07-31 | End: 2022-07-31

## 2022-07-31 RX ORDER — PANTOPRAZOLE SODIUM 40 MG/10ML
40 INJECTION, POWDER, LYOPHILIZED, FOR SOLUTION INTRAVENOUS ONCE
Status: COMPLETED | OUTPATIENT
Start: 2022-07-31 | End: 2022-08-01

## 2022-07-31 RX ADMIN — MELATONIN TAB 3 MG 3 MG: 3 TAB at 21:16

## 2022-07-31 RX ADMIN — MUPIROCIN: 20 OINTMENT TOPICAL at 21:16

## 2022-07-31 RX ADMIN — METOPROLOL TARTRATE 25 MG: 25 TABLET, FILM COATED ORAL at 08:41

## 2022-07-31 RX ADMIN — ENOXAPARIN SODIUM 30 MG: 100 INJECTION SUBCUTANEOUS at 21:16

## 2022-07-31 RX ADMIN — DIAZEPAM 5 MG: 5 TABLET ORAL at 08:41

## 2022-07-31 RX ADMIN — CHLORHEXIDINE GLUCONATE: 213 SOLUTION TOPICAL at 21:40

## 2022-07-31 RX ADMIN — ENOXAPARIN SODIUM 30 MG: 100 INJECTION SUBCUTANEOUS at 08:41

## 2022-07-31 RX ADMIN — DIAZEPAM 5 MG: 5 TABLET ORAL at 14:38

## 2022-07-31 RX ADMIN — Medication 10 ML: at 21:36

## 2022-07-31 RX ADMIN — Medication 5 ML: at 10:11

## 2022-07-31 RX ADMIN — PANTOPRAZOLE SODIUM 40 MG: 40 TABLET, DELAYED RELEASE ORAL at 08:41

## 2022-07-31 RX ADMIN — ALPRAZOLAM 0.25 MG: 0.25 TABLET ORAL at 22:40

## 2022-07-31 RX ADMIN — DIAZEPAM 5 MG: 5 TABLET ORAL at 21:16

## 2022-07-31 RX ADMIN — ACETAMINOPHEN 650 MG: 325 TABLET ORAL at 10:07

## 2022-07-31 RX ADMIN — METOPROLOL TARTRATE 25 MG: 25 TABLET, FILM COATED ORAL at 21:16

## 2022-07-31 ASSESSMENT — PAIN SCALES - GENERAL
PAINLEVEL_OUTOF10: 0
PAINLEVEL_OUTOF10: 3

## 2022-07-31 ASSESSMENT — PAIN DESCRIPTION - PAIN TYPE: TYPE: ACUTE PAIN

## 2022-07-31 ASSESSMENT — PAIN DESCRIPTION - DESCRIPTORS: DESCRIPTORS: ACHING

## 2022-07-31 ASSESSMENT — PAIN DESCRIPTION - LOCATION: LOCATION: HEAD

## 2022-07-31 NOTE — PROGRESS NOTES
[facility]   Cardiology Progress Note   Date: 7/31/2022  Admit Date: 7/29/2022     Reason for follow up: CP, CAD    Chief Complaint:   Chief Complaint   Patient presents with    Chest Pain     Patient states CP x2 month, intermittent, states pain depends \"on how worked up I get\" saw Romina Schafer and was told if pain continues to come to ER     History of Present Illness: History obtained from patient and medical record. Cele Stoner is a 47 y.o. male with a past medical history of hypertension, hutchinson's esophagus, mitral incompetence, CAD s/p stents x 4 (2010 and 2016), current smoker with nicotine and marijuana use, who presented with CP s/p LHC showed MVD and CTS was consulted for CABG. Interval Hx: Today, he is being seen for follow up. SR on telemetry without ectopy. Feeling less anxious with valium. Continues to be nervous and has many questions regarding surgery tomorrow. No new complaints today. No major events overnight. Denies having chest pain, palpitations, shortness of breath, orthopnea or dizziness. Patient seen and examined. Clinical notes reviewed. Telemetry reviewed.      Assessment and Plan:  Chest Pain   - Denies recurrence  MVCAD/Ischemic Cardiomyopathy   - Per [facility]   - Plans for CABG on Monday    - Denies any concerning angina   - echocardiogram shows EF 45%  Hypertension   - Controlled  Nicotine dependence, Marijuana use   - Discussed complete cessation   -  He has been trying to [place] and recently stopped several days before entering hospital  HLD   -    - Needs high intensity statin, multiple statin intolerances, consider Lipitor  Anxiety   - Valium added per primary team    - Avoid shower, he needs to remain on telemetry, he can wash up as tolerated  - Heparin gtt if chest pain recurrence  - Plans for CABG Monday per CTS, echo today   - Transfer to CVU later today to prep for OR    All pertinent information and plan of care discussed with the rounding/attending cardiologist.    Multiple medical conditions with risk of decompensation. All questions and concerns were addressed to the patient. Alternatives to my treatment were discussed. I have discussed the above stated plan with patient and the nurse. The patient verbalized understanding and agreed with the plan. Thank you for allowing to us to participate in the care of Aletha Wang. .    Problem List:   Patient Active Problem List    Diagnosis Date Noted    Essential hypertension 12/17/2015    Mixed hyperlipidemia 12/17/2015    Chest pain 07/29/2022    Coronary artery disease involving native coronary artery of native heart without angina pectoris 12/17/2015    Coronary artery disease due to lipid rich plaque 05/06/2020    Hypercholesteremia 09/07/2018    Tobacco abuse 09/07/2018    Unstable angina ([facility] [address] 06/22/2012      Allergies: Allergies   Allergen Reactions    Fenofibrate     Statins      Crestor, pravachol       Home Meds:  Prior to Visit Medications    Medication Sig Taking? Authorizing Provider   pantoprazole (PROTONIX) 40 MG tablet Take 40 mg by mouth daily  Historical Provider, MD   clonazePAM (KLONOPIN) 0.5 MG tablet Take 0.5 mg by mouth 2 times daily as needed. Historical Provider, MD   metoprolol tartrate (LOPRESSOR) 25 MG tablet Take 25 mg by mouth in the morning and 25 mg before bedtime. Pt takes 5 mg daily.   Historical Provider, MD   Acetylcysteine (NAC PO) Take by mouth  Historical Provider, MD   aspirin 81 MG EC tablet Take 1 tablet by mouth daily  Yue Ku MD      Scheduled Meds:   aspirin EC  81 mg Oral Daily    metoprolol tartrate  25 mg Oral BID    pantoprazole  40 mg Oral Daily    sodium chloride flush  5-40 mL IntraVENous 2 times per day    enoxaparin  30 mg SubCUTAneous BID    nicotine  1 patch TransDERmal Daily     Continuous Infusions:   sodium chloride       PRN Meds:diazePAM, melatonin, clonazePAM, sodium chloride flush, sodium chloride, ondansetron rhythm  Constitutional: Cooperative and in no apparent distress, and appears well nourished  Skin: Warm and pink; no cyanosis, bruising, or clubbing  HEENT: Symmetric and normocephalic. Conjunctiva pink with clear sclera. Mucus membranes pink and moist.   Cardiovascular: regular and rhythm. S1 & S2, positive for murmurs. Peripheral pulses 2+, capillary refill < 3 seconds. positive elevation of JVP. Respiratory: Respirations symmetric and unlabored. Lungs clear to auscultation bilaterally, no wheezing, crackles, or rhonchi  Gastrointestinal: Abdomen soft and round. Bowel sounds normoactive in all quadrants. Musculoskeletal: No focal weakness. Neurologic/Psych: Awake and orientated to person, place and time. Calm affect, appropriate mood    Pertinent labs, diagnostic, device, and imaging results reviewed as a part of this visit    Labs:    BMP:   Recent Labs     07/29/22  0821 07/29/22  1435 07/30/22  0538    138 137   K 3.7 3.4* 3.9   CL 99 102 103   CO2 27 25 25   BUN 14 13 13   CREATININE 0.9 1.0 1.0   MG  --  2.10  --      Estimated Creatinine Clearance: 99 mL/min (based on SCr of 1 mg/dL).    CBC:   Recent Labs     07/29/22  0821 07/29/22  1435 07/30/22  0538   WBC 6.9 6.2 5.6   HGB 16.1 13.9 15.0   HCT 46.1 40.5 43.6   MCV 90.0 90.0 90.9    175 173     Thyroid: No results found for: TSH, X4IACGG, E3HNTVR, THYROIDAB  Lipids:   Lab Results   Component Value Date/Time    CHOL 182 07/29/2022 02:35 PM    HDL 18 07/29/2022 02:35 PM    TRIG 479 07/29/2022 02:35 PM     LFTS:   Lab Results   Component Value Date/Time    ALT 38 07/29/2022 02:35 PM    AST 22 07/29/2022 02:35 PM    ALKPHOS 104 07/29/2022 02:35 PM    PROT 6.8 07/29/2022 02:35 PM    AGRATIO 1.6 07/29/2022 02:35 PM    BILITOT 0.6 07/29/2022 02:35 PM     Cardiac Enzymes:   Lab Results   Component Value Date/Time    TROPONINI <0.01 07/29/2022 08:21 AM    TROPONINI <0.01 07/14/2022 09:12 PM    TROPONINI <0.01 06/23/2012 05:25 AM    TROPONINI <0.01 2012 11:03 PM    TROPONINI <0.01 2012 04:53 PM     Coags:   Lab Results   Component Value Date/Time    PROTIME 14.7 2022 02:35 PM    INR 1.16 2022 02:35 PM     EC2022\" SR, septal infarct    ECHO:  2022   Summary   Normal left ventricular cavity size and wall thickness. Mild reduced ejection fraction estimated at 45%. No apical thrombus was visualized using Definity contrast.   Mild eccentric mitral regurgitation. Normal diastolic function. Incidental Finding: Large gallstone seen in the gallbladder. 2014   Summary       Normal left ventricle size, wall thickness and systolic function with an EF    of 55%. hypokinesis of the apical anterior wall. Trivial tricuspid and mitral regurgitation.   Cath: 2022  Findings:  Artery Findings/Result   LM 50% distal, eccentric   LAD 70% prox, patent mid stents, 60% mid   Cx OM1 70% ostial, OM3 100% with L-L collaterals   RI 50% mid   RCA 95% prox, 40% instent prox, 50% mid   LVEDP 18   LVG 65%      Intervention:         None     Post Cath Dx:       Admit to hospital  CABG - consider LAD, OM1, OM3, PDA +/- ramus although appears small  Olga Felty, APRN-CNP  [address]   Office: [phone]

## 2022-07-31 NOTE — PLAN OF CARE
Problem: Safety - Adult  Goal: Free from fall injury  7/31/2022 1021 by Chandra Singh RN  Outcome: Progressing     Problem: Cardiovascular - Adult  Goal: Maintains optimal cardiac output and hemodynamic stability  7/31/2022 1021 by Chandra Singh RN  Outcome: Progressing     Problem: Cardiovascular - Adult  Goal: Absence of cardiac dysrhythmias or at baseline  7/31/2022 1021 by Chandra Singh RN  Outcome: Progressing     Problem: Musculoskeletal - Adult  Goal: Return mobility to safest level of function  Outcome: Progressing     Problem: Metabolic/Fluid and Electrolytes - Adult  Goal: Hemodynamic stability and optimal renal function maintained  Outcome: Progressing     Problem: Hematologic - Adult  Goal: Maintains hematologic stability  Outcome: Progressing

## 2022-07-31 NOTE — PROGRESS NOTES
[facility] PROGRESS NOTE    7/31/2022 7:39 AM        Name: Kaylene Gutierrez Sr. .              Admitted: 7/29/2022  Primary Care Provider: Andrae REYES (Tel: [phone])                        Subjective:  . No acute events overnight. Resting well. Pain control. Diet ok. Labs reviewed  Denies any chest pain sob. Reviewed interval ancillary notes    Current Medications  diazePAM (VALIUM) tablet 5 mg, Q6H PRN  melatonin tablet 3 mg, Daily PRN  aspirin EC tablet 81 mg, Daily  clonazePAM (KLONOPIN) tablet 0.5 mg, BID PRN  metoprolol tartrate (LOPRESSOR) tablet 25 mg, BID  pantoprazole (PROTONIX) tablet 40 mg, Daily  sodium chloride flush 0.9 % injection 5-40 mL, 2 times per day  sodium chloride flush 0.9 % injection 5-40 mL, PRN  0.9 % sodium chloride infusion, PRN  ondansetron (ZOFRAN-ODT) disintegrating tablet 4 mg, Q8H PRN   Or  ondansetron (ZOFRAN) injection 4 mg, Q6H PRN  polyethylene glycol (GLYCOLAX) packet 17 g, Daily PRN  morphine (PF) injection 2 mg, Q4H PRN  enoxaparin Sodium (LOVENOX) injection 30 mg, BID  acetaminophen (TYLENOL) tablet 650 mg, Q4H PRN  nicotine (NICODERM CQ) 21 MG/24HR 1 patch, Daily      Objective:  /81   Pulse 76   Temp 97.6 °F (36.4 °C) (Oral)   Resp 18   Ht 6' 1\" (1.854 m)   Wt 209 lb 14.4 oz (95.2 kg)   SpO2 95%   BMI 27.69 kg/m²     Intake/Output Summary (Last 24 hours) at 7/31/2022 0739  Last data filed at 7/30/2022 1806  Gross per 24 hour   Intake 960 ml   Output --   Net 960 ml      Wt Readings from Last 3 Encounters:   07/31/22 209 lb 14.4 oz (95.2 kg)   07/28/22 222 lb 12.8 oz (101.1 kg)   07/14/22 204 lb (92.5 kg)       General appearance:  Appears comfortable  Eyes: Sclera clear. Pupils equal.  ENT: Moist oral mucosa. Trachea midline, no adenopathy. Cardiovascular: Regular rhythm, normal S1, S2. No murmur.  No edema in lower extremities  Respiratory: Not using accessory muscles. Good inspiratory effort. Clear to auscultation bilaterally, no wheeze or crackles. GI: Abdomen soft, no tenderness, not distended, normal bowel sounds  Musculoskeletal: No cyanosis in digits, neck supple  Neurology: CN 2-12 grossly intact. No speech or motor deficits  Psych: Normal affect. Alert and oriented in time, place and person  Skin: Warm, dry, normal turgor    Labs and Tests:  CBC:   Recent Labs     07/29/22  0821 07/29/22  1435 07/30/22  0538   WBC 6.9 6.2 5.6   HGB 16.1 13.9 15.0    175 173     BMP:    Recent Labs     07/29/22  0821 07/29/22  1435 07/30/22  0538    138 137   K 3.7 3.4* 3.9   CL 99 102 103   CO2 27 25 25   BUN 14 13 13   CREATININE 0.9 1.0 1.0   GLUCOSE 110* 113* 99     Hepatic:   Recent Labs     07/29/22  0821 07/29/22  1435   AST 25 22   ALT 46* 38   BILITOT 0.8 0.6   ALKPHOS 126 104       Discussed care with patient             Problem List  Principal Problem:    Chest pain  Resolved Problems:    * No resolved hospital problems. *       Assessment & Plan:   Unstable angina  S/p C with 3 vessels disease  - referred for CABG   -appreciate CT surg and cardiology rec  - continue asa, statin       htn  Improved       Anxiety  - meds adjusted      Diet: ADULT DIET;  Regular  Code:Full Code  DVT PPX lovenox       Jalil Snow MD   7/31/2022 7:39 AM

## 2022-07-31 NOTE — PROGRESS NOTES
Pt arrived to 2906 from 3A. Pt is alert and oriented. VSS. No chest pain or SOB at this time. Pt oriented to the room. Call light within reach. Nonskid socks on. Family at bedside.

## 2022-07-31 NOTE — PROGRESS NOTES
Pt very anxious about [facility] surgery tomorrow. Charge RN at bedside educating pt and family about procedure tomorrow.

## 2022-07-31 NOTE — PROGRESS NOTES
Pt and belonging transported to 2906 by this nurse. Face to face Report given to nurse Rosa Maria Maurer.

## 2022-07-31 NOTE — PLAN OF CARE
Problem: Discharge Planning  Goal: Discharge to home or other facility with appropriate resources  Outcome: Progressing     Problem: Safety - Adult  Goal: Free from fall injury  Outcome: Progressing     Problem: Cardiovascular - Adult  Goal: Maintains optimal cardiac output and hemodynamic stability  Outcome: Progressing  Goal: Absence of cardiac dysrhythmias or at baseline  Outcome: Progressing     Problem: Pain  Goal: Verbalizes/displays adequate comfort level or baseline comfort level  Outcome: Progressing

## 2022-08-01 ENCOUNTER — APPOINTMENT (OUTPATIENT)
Dept: GENERAL RADIOLOGY | Age: 52
DRG: 234 | End: 2022-08-01
Payer: COMMERCIAL

## 2022-08-01 ENCOUNTER — ANESTHESIA (OUTPATIENT)
Dept: OPERATING ROOM | Age: 52
DRG: 234 | End: 2022-08-01
Payer: COMMERCIAL

## 2022-08-01 PROBLEM — R07.89 CHEST DISCOMFORT: Status: ACTIVE | Noted: 2022-08-01

## 2022-08-01 LAB
ACTIVATED CLOTTING TIME: 127 SEC (ref 99–130)
ACTIVATED CLOTTING TIME: 132 SEC (ref 99–130)
ACTIVATED CLOTTING TIME: 451 SEC (ref 99–130)
ACTIVATED CLOTTING TIME: 462 SEC (ref 99–130)
ACTIVATED CLOTTING TIME: 465 SEC (ref 99–130)
ACTIVATED CLOTTING TIME: 473 SEC (ref 99–130)
ACTIVATED CLOTTING TIME: 539 SEC (ref 99–130)
ANION GAP SERPL CALCULATED.3IONS-SCNC: 12 MMOL/L (ref 3–16)
APTT: 28 SEC (ref 23–34.3)
BASE EXCESS ARTERIAL: -1 (ref -3–3)
BASE EXCESS ARTERIAL: -2 (ref -3–3)
BASE EXCESS ARTERIAL: -3 (ref -3–3)
BASE EXCESS ARTERIAL: 0 (ref -3–3)
BASE EXCESS ARTERIAL: 0 (ref -3–3)
BASE EXCESS ARTERIAL: 1 (ref -3–3)
BASE EXCESS ARTERIAL: 1 (ref -3–3)
BLOOD BANK DISPENSE STATUS: NORMAL
BLOOD BANK PRODUCT CODE: NORMAL
BPU ID: NORMAL
BUN BLDV-MCNC: 10 MG/DL (ref 7–20)
CALCIUM IONIZED: 1.04 MMOL/L (ref 1.12–1.32)
CALCIUM IONIZED: 1.04 MMOL/L (ref 1.12–1.32)
CALCIUM IONIZED: 1.08 MMOL/L (ref 1.12–1.32)
CALCIUM IONIZED: 1.09 MMOL/L (ref 1.12–1.32)
CALCIUM IONIZED: 1.09 MMOL/L (ref 1.12–1.32)
CALCIUM IONIZED: 1.11 MMOL/L (ref 1.12–1.32)
CALCIUM IONIZED: 1.19 MMOL/L (ref 1.12–1.32)
CALCIUM IONIZED: 1.29 MMOL/L (ref 1.12–1.32)
CALCIUM IONIZED: 1.39 MMOL/L (ref 1.12–1.32)
CALCIUM SERPL-MCNC: 9.5 MG/DL (ref 8.3–10.6)
CHLORIDE BLD-SCNC: 102 MMOL/L (ref 99–110)
CO2: 23 MMOL/L (ref 21–32)
CREAT SERPL-MCNC: 1.1 MG/DL (ref 0.9–1.3)
DESCRIPTION BLOOD BANK: NORMAL
GFR AFRICAN AMERICAN: >60
GFR NON-AFRICAN AMERICAN: >60
GLUCOSE BLD-MCNC: 110 MG/DL (ref 70–99)
GLUCOSE BLD-MCNC: 115 MG/DL (ref 70–99)
GLUCOSE BLD-MCNC: 117 MG/DL (ref 70–99)
GLUCOSE BLD-MCNC: 120 MG/DL (ref 70–99)
GLUCOSE BLD-MCNC: 121 MG/DL (ref 70–99)
GLUCOSE BLD-MCNC: 122 MG/DL (ref 70–99)
GLUCOSE BLD-MCNC: 124 MG/DL (ref 70–99)
GLUCOSE BLD-MCNC: 125 MG/DL (ref 70–99)
GLUCOSE BLD-MCNC: 126 MG/DL (ref 70–99)
GLUCOSE BLD-MCNC: 130 MG/DL (ref 70–99)
GLUCOSE BLD-MCNC: 132 MG/DL (ref 70–99)
GLUCOSE BLD-MCNC: 137 MG/DL (ref 70–99)
GLUCOSE BLD-MCNC: 146 MG/DL (ref 70–99)
GLUCOSE BLD-MCNC: 158 MG/DL (ref 70–99)
GLUCOSE BLD-MCNC: 160 MG/DL (ref 70–99)
GLUCOSE BLD-MCNC: 160 MG/DL (ref 70–99)
GLUCOSE BLD-MCNC: 161 MG/DL (ref 70–99)
GLUCOSE BLD-MCNC: 170 MG/DL (ref 70–99)
GLUCOSE BLD-MCNC: 188 MG/DL (ref 70–99)
HCO3 ARTERIAL: 22.3 MMOL/L (ref 21–29)
HCO3 ARTERIAL: 23.2 MMOL/L (ref 21–29)
HCO3 ARTERIAL: 23.8 MMOL/L (ref 21–29)
HCO3 ARTERIAL: 23.9 MMOL/L (ref 21–29)
HCO3 ARTERIAL: 24.2 MMOL/L (ref 21–29)
HCO3 ARTERIAL: 24.4 MMOL/L (ref 21–29)
HCO3 ARTERIAL: 25.5 MMOL/L (ref 21–29)
HCO3 ARTERIAL: 25.6 MMOL/L (ref 21–29)
HCO3 ARTERIAL: 25.6 MMOL/L (ref 21–29)
HCO3 ARTERIAL: 25.8 MMOL/L (ref 21–29)
HCT VFR BLD CALC: 38.2 % (ref 40.5–52.5)
HEMOGLOBIN: 11.4 GM/DL (ref 13.5–17.5)
HEMOGLOBIN: 12.8 G/DL (ref 13.5–17.5)
HEMOGLOBIN: 12.9 GM/DL (ref 13.5–17.5)
HEMOGLOBIN: 13.2 GM/DL (ref 13.5–17.5)
HEMOGLOBIN: 14.8 GM/DL (ref 13.5–17.5)
HEMOGLOBIN: 9.3 GM/DL (ref 13.5–17.5)
HEMOGLOBIN: 9.3 GM/DL (ref 13.5–17.5)
HEMOGLOBIN: 9.4 GM/DL (ref 13.5–17.5)
HEMOGLOBIN: 9.6 GM/DL (ref 13.5–17.5)
HEMOGLOBIN: 9.8 GM/DL (ref 13.5–17.5)
INR BLD: 1.4 (ref 0.87–1.14)
LACTATE: 0.3 MMOL/L (ref 0.4–2)
LACTATE: 1.05 MMOL/L (ref 0.4–2)
LACTATE: 1.31 MMOL/L (ref 0.4–2)
LACTATE: 1.45 MMOL/L (ref 0.4–2)
LACTATE: 1.5 MMOL/L (ref 0.4–2)
LACTATE: 1.7 MMOL/L (ref 0.4–2)
LACTATE: 2.14 MMOL/L (ref 0.4–2)
LACTATE: 2.43 MMOL/L (ref 0.4–2)
MAGNESIUM: 2.8 MG/DL (ref 1.8–2.4)
MCH RBC QN AUTO: 30.6 PG (ref 26–34)
MCHC RBC AUTO-ENTMCNC: 33.6 G/DL (ref 31–36)
MCV RBC AUTO: 91.1 FL (ref 80–100)
O2 SAT, ARTERIAL: 100 % (ref 93–100)
O2 SAT, ARTERIAL: 92 % (ref 93–100)
O2 SAT, ARTERIAL: 93 % (ref 93–100)
O2 SAT, ARTERIAL: 96 % (ref 93–100)
O2 SAT, ARTERIAL: 97 % (ref 93–100)
PCO2 ARTERIAL: 36 MM HG (ref 35–45)
PCO2 ARTERIAL: 37.6 MM HG (ref 35–45)
PCO2 ARTERIAL: 38.4 MM HG (ref 35–45)
PCO2 ARTERIAL: 39.4 MM HG (ref 35–45)
PCO2 ARTERIAL: 40 MM HG (ref 35–45)
PCO2 ARTERIAL: 40.4 MM HG (ref 35–45)
PCO2 ARTERIAL: 41.2 MM HG (ref 35–45)
PCO2 ARTERIAL: 42.4 MM HG (ref 35–45)
PCO2 ARTERIAL: 47.7 MM HG (ref 35–45)
PCO2 ARTERIAL: 48.3 MM HG (ref 35–45)
PDW BLD-RTO: 13.9 % (ref 12.4–15.4)
PERFORMED ON: ABNORMAL
PH ARTERIAL: 7.34 (ref 7.35–7.45)
PH ARTERIAL: 7.34 (ref 7.35–7.45)
PH ARTERIAL: 7.37 (ref 7.35–7.45)
PH ARTERIAL: 7.37 (ref 7.35–7.45)
PH ARTERIAL: 7.38 (ref 7.35–7.45)
PH ARTERIAL: 7.4 (ref 7.35–7.45)
PH ARTERIAL: 7.41 (ref 7.35–7.45)
PH ARTERIAL: 7.43 (ref 7.35–7.45)
PLATELET # BLD: 209 K/UL (ref 135–450)
PMV BLD AUTO: 8.4 FL (ref 5–10.5)
PO2 ARTERIAL: 277.4 MM HG (ref 75–108)
PO2 ARTERIAL: 315.6 MM HG (ref 75–108)
PO2 ARTERIAL: 346.8 MM HG (ref 75–108)
PO2 ARTERIAL: 348.7 MM HG (ref 75–108)
PO2 ARTERIAL: 348.8 MM HG (ref 75–108)
PO2 ARTERIAL: 358.6 MM HG (ref 75–108)
PO2 ARTERIAL: 65.3 MM HG (ref 75–108)
PO2 ARTERIAL: 71.3 MM HG (ref 75–108)
PO2 ARTERIAL: 78 MM HG (ref 75–108)
PO2 ARTERIAL: 90.1 MM HG (ref 75–108)
POC HEMATOCRIT: 27 % (ref 40.5–52.5)
POC HEMATOCRIT: 27 % (ref 40.5–52.5)
POC HEMATOCRIT: 28 % (ref 40.5–52.5)
POC HEMATOCRIT: 28 % (ref 40.5–52.5)
POC HEMATOCRIT: 29 % (ref 40.5–52.5)
POC HEMATOCRIT: 33 % (ref 40.5–52.5)
POC HEMATOCRIT: 38 % (ref 40.5–52.5)
POC HEMATOCRIT: 39 % (ref 40.5–52.5)
POC HEMATOCRIT: 44 % (ref 40.5–52.5)
POC POTASSIUM: 4 MMOL/L (ref 3.5–5.1)
POC POTASSIUM: 4.1 MMOL/L (ref 3.5–5.1)
POC POTASSIUM: 4.3 MMOL/L (ref 3.5–5.1)
POC POTASSIUM: 4.5 MMOL/L (ref 3.5–5.1)
POC POTASSIUM: 4.6 MMOL/L (ref 3.5–5.1)
POC POTASSIUM: 4.8 MMOL/L (ref 3.5–5.1)
POC POTASSIUM: 4.9 MMOL/L (ref 3.5–5.1)
POC POTASSIUM: 5.2 MMOL/L (ref 3.5–5.1)
POC POTASSIUM: 5.3 MMOL/L (ref 3.5–5.1)
POC SAMPLE TYPE: ABNORMAL
POC SODIUM: 137 MMOL/L (ref 136–145)
POC SODIUM: 139 MMOL/L (ref 136–145)
POC SODIUM: 139 MMOL/L (ref 136–145)
POC SODIUM: 140 MMOL/L (ref 136–145)
POC SODIUM: 141 MMOL/L (ref 136–145)
POC SODIUM: 143 MMOL/L (ref 136–145)
POC SODIUM: 144 MMOL/L (ref 136–145)
POTASSIUM SERPL-SCNC: 4.2 MMOL/L (ref 3.5–5.1)
PROTHROMBIN TIME: 17.1 SEC (ref 11.7–14.5)
RBC # BLD: 4.19 M/UL (ref 4.2–5.9)
SARS-COV-2, NAAT: NOT DETECTED
SODIUM BLD-SCNC: 137 MMOL/L (ref 136–145)
SPECIMEN STATUS: NORMAL
TCO2 ARTERIAL: 23 MMOL/L
TCO2 ARTERIAL: 25 MMOL/L
TCO2 ARTERIAL: 26 MMOL/L
TCO2 ARTERIAL: 27 MMOL/L
WBC # BLD: 18.2 K/UL (ref 4–11)

## 2022-08-01 PROCEDURE — 33268 EXCL LAA OPN OTH PX ANY METH: CPT | Performed by: THORACIC SURGERY (CARDIOTHORACIC VASCULAR SURGERY)

## 2022-08-01 PROCEDURE — 02100Z9 BYPASS CORONARY ARTERY, ONE ARTERY FROM LEFT INTERNAL MAMMARY, OPEN APPROACH: ICD-10-PCS | Performed by: THORACIC SURGERY (CARDIOTHORACIC VASCULAR SURGERY)

## 2022-08-01 PROCEDURE — 82330 ASSAY OF CALCIUM: CPT

## 2022-08-01 PROCEDURE — 2580000003 HC RX 258

## 2022-08-01 PROCEDURE — 2100000000 HC CCU R&B

## 2022-08-01 PROCEDURE — 3700000000 HC ANESTHESIA ATTENDED CARE: Performed by: THORACIC SURGERY (CARDIOTHORACIC VASCULAR SURGERY)

## 2022-08-01 PROCEDURE — 33533 CABG ARTERIAL SINGLE: CPT

## 2022-08-01 PROCEDURE — 2500000003 HC RX 250 WO HCPCS: Performed by: THORACIC SURGERY (CARDIOTHORACIC VASCULAR SURGERY)

## 2022-08-01 PROCEDURE — 85347 COAGULATION TIME ACTIVATED: CPT

## 2022-08-01 PROCEDURE — 6370000000 HC RX 637 (ALT 250 FOR IP): Performed by: THORACIC SURGERY (CARDIOTHORACIC VASCULAR SURGERY)

## 2022-08-01 PROCEDURE — 021109W BYPASS CORONARY ARTERY, TWO ARTERIES FROM AORTA WITH AUTOLOGOUS VENOUS TISSUE, OPEN APPROACH: ICD-10-PCS | Performed by: THORACIC SURGERY (CARDIOTHORACIC VASCULAR SURGERY)

## 2022-08-01 PROCEDURE — 3600000018 HC SURGERY OHS ADDTL 15MIN: Performed by: THORACIC SURGERY (CARDIOTHORACIC VASCULAR SURGERY)

## 2022-08-01 PROCEDURE — 02HV33Z INSERTION OF INFUSION DEVICE INTO SUPERIOR VENA CAVA, PERCUTANEOUS APPROACH: ICD-10-PCS | Performed by: HOSPITALIST

## 2022-08-01 PROCEDURE — 84132 ASSAY OF SERUM POTASSIUM: CPT

## 2022-08-01 PROCEDURE — 33268 EXCL LAA OPN OTH PX ANY METH: CPT

## 2022-08-01 PROCEDURE — 84295 ASSAY OF SERUM SODIUM: CPT

## 2022-08-01 PROCEDURE — 6360000002 HC RX W HCPCS: Performed by: THORACIC SURGERY (CARDIOTHORACIC VASCULAR SURGERY)

## 2022-08-01 PROCEDURE — 2580000003 HC RX 258: Performed by: THORACIC SURGERY (CARDIOTHORACIC VASCULAR SURGERY)

## 2022-08-01 PROCEDURE — 36415 COLL VENOUS BLD VENIPUNCTURE: CPT

## 2022-08-01 PROCEDURE — 02L70CK OCCLUSION OF LEFT ATRIAL APPENDAGE WITH EXTRALUMINAL DEVICE, OPEN APPROACH: ICD-10-PCS | Performed by: THORACIC SURGERY (CARDIOTHORACIC VASCULAR SURGERY)

## 2022-08-01 PROCEDURE — 94640 AIRWAY INHALATION TREATMENT: CPT

## 2022-08-01 PROCEDURE — 3700000001 HC ADD 15 MINUTES (ANESTHESIA): Performed by: THORACIC SURGERY (CARDIOTHORACIC VASCULAR SURGERY)

## 2022-08-01 PROCEDURE — 85027 COMPLETE CBC AUTOMATED: CPT

## 2022-08-01 PROCEDURE — 2500000003 HC RX 250 WO HCPCS: Performed by: ANESTHESIOLOGY

## 2022-08-01 PROCEDURE — 6370000000 HC RX 637 (ALT 250 FOR IP)

## 2022-08-01 PROCEDURE — 6360000002 HC RX W HCPCS: Performed by: ANESTHESIOLOGY

## 2022-08-01 PROCEDURE — 06BQ4ZZ EXCISION OF LEFT SAPHENOUS VEIN, PERCUTANEOUS ENDOSCOPIC APPROACH: ICD-10-PCS | Performed by: THORACIC SURGERY (CARDIOTHORACIC VASCULAR SURGERY)

## 2022-08-01 PROCEDURE — 33508 ENDOSCOPIC VEIN HARVEST: CPT

## 2022-08-01 PROCEDURE — C9113 INJ PANTOPRAZOLE SODIUM, VIA: HCPCS

## 2022-08-01 PROCEDURE — 2580000003 HC RX 258: Performed by: ANESTHESIOLOGY

## 2022-08-01 PROCEDURE — 94761 N-INVAS EAR/PLS OXIMETRY MLT: CPT

## 2022-08-01 PROCEDURE — 94669 MECHANICAL CHEST WALL OSCILL: CPT

## 2022-08-01 PROCEDURE — 2580000003 HC RX 258: Performed by: HOSPITALIST

## 2022-08-01 PROCEDURE — C9290 INJ, BUPIVACAINE LIPOSOME: HCPCS | Performed by: THORACIC SURGERY (CARDIOTHORACIC VASCULAR SURGERY)

## 2022-08-01 PROCEDURE — 2720000010 HC SURG SUPPLY STERILE: Performed by: THORACIC SURGERY (CARDIOTHORACIC VASCULAR SURGERY)

## 2022-08-01 PROCEDURE — 6360000002 HC RX W HCPCS: Performed by: HOSPITALIST

## 2022-08-01 PROCEDURE — C1713 ANCHOR/SCREW BN/BN,TIS/BN: HCPCS | Performed by: THORACIC SURGERY (CARDIOTHORACIC VASCULAR SURGERY)

## 2022-08-01 PROCEDURE — 6360000002 HC RX W HCPCS

## 2022-08-01 PROCEDURE — 2709999900 HC NON-CHARGEABLE SUPPLY: Performed by: THORACIC SURGERY (CARDIOTHORACIC VASCULAR SURGERY)

## 2022-08-01 PROCEDURE — 3600000008 HC SURGERY OHS BASE: Performed by: THORACIC SURGERY (CARDIOTHORACIC VASCULAR SURGERY)

## 2022-08-01 PROCEDURE — 94002 VENT MGMT INPAT INIT DAY: CPT

## 2022-08-01 PROCEDURE — 33533 CABG ARTERIAL SINGLE: CPT | Performed by: THORACIC SURGERY (CARDIOTHORACIC VASCULAR SURGERY)

## 2022-08-01 PROCEDURE — 82803 BLOOD GASES ANY COMBINATION: CPT

## 2022-08-01 PROCEDURE — 6360000002 HC RX W HCPCS: Performed by: INTERNAL MEDICINE

## 2022-08-01 PROCEDURE — 93005 ELECTROCARDIOGRAM TRACING: CPT | Performed by: THORACIC SURGERY (CARDIOTHORACIC VASCULAR SURGERY)

## 2022-08-01 PROCEDURE — 7100000010 HC PHASE II RECOVERY - FIRST 15 MIN

## 2022-08-01 PROCEDURE — 71045 X-RAY EXAM CHEST 1 VIEW: CPT

## 2022-08-01 PROCEDURE — C1751 CATH, INF, PER/CENT/MIDLINE: HCPCS | Performed by: THORACIC SURGERY (CARDIOTHORACIC VASCULAR SURGERY)

## 2022-08-01 PROCEDURE — P9045 ALBUMIN (HUMAN), 5%, 250 ML: HCPCS | Performed by: ANESTHESIOLOGY

## 2022-08-01 PROCEDURE — 7100000011 HC PHASE II RECOVERY - ADDTL 15 MIN

## 2022-08-01 PROCEDURE — 83605 ASSAY OF LACTIC ACID: CPT

## 2022-08-01 PROCEDURE — 0HB5XZZ EXCISION OF CHEST SKIN, EXTERNAL APPROACH: ICD-10-PCS | Performed by: THORACIC SURGERY (CARDIOTHORACIC VASCULAR SURGERY)

## 2022-08-01 PROCEDURE — 11402 EXC TR-EXT B9+MARG 1.1-2 CM: CPT | Performed by: THORACIC SURGERY (CARDIOTHORACIC VASCULAR SURGERY)

## 2022-08-01 PROCEDURE — 85730 THROMBOPLASTIN TIME PARTIAL: CPT

## 2022-08-01 PROCEDURE — 82947 ASSAY GLUCOSE BLOOD QUANT: CPT

## 2022-08-01 PROCEDURE — B24BZZ4 ULTRASONOGRAPHY OF HEART WITH AORTA, TRANSESOPHAGEAL: ICD-10-PCS | Performed by: THORACIC SURGERY (CARDIOTHORACIC VASCULAR SURGERY)

## 2022-08-01 PROCEDURE — 85610 PROTHROMBIN TIME: CPT

## 2022-08-01 PROCEDURE — 2700000000 HC OXYGEN THERAPY PER DAY

## 2022-08-01 PROCEDURE — 83735 ASSAY OF MAGNESIUM: CPT

## 2022-08-01 PROCEDURE — A4217 STERILE WATER/SALINE, 500 ML: HCPCS | Performed by: THORACIC SURGERY (CARDIOTHORACIC VASCULAR SURGERY)

## 2022-08-01 PROCEDURE — 80048 BASIC METABOLIC PNL TOTAL CA: CPT

## 2022-08-01 PROCEDURE — 5A1221Z PERFORMANCE OF CARDIAC OUTPUT, CONTINUOUS: ICD-10-PCS | Performed by: THORACIC SURGERY (CARDIOTHORACIC VASCULAR SURGERY)

## 2022-08-01 PROCEDURE — C1729 CATH, DRAINAGE: HCPCS | Performed by: THORACIC SURGERY (CARDIOTHORACIC VASCULAR SURGERY)

## 2022-08-01 PROCEDURE — 33518 CABG ARTERY-VEIN TWO: CPT

## 2022-08-01 PROCEDURE — 6370000000 HC RX 637 (ALT 250 FOR IP): Performed by: HOSPITALIST

## 2022-08-01 PROCEDURE — 88304 TISSUE EXAM BY PATHOLOGIST: CPT

## 2022-08-01 PROCEDURE — 6370000000 HC RX 637 (ALT 250 FOR IP): Performed by: INTERNAL MEDICINE

## 2022-08-01 PROCEDURE — 85014 HEMATOCRIT: CPT

## 2022-08-01 PROCEDURE — 94760 N-INVAS EAR/PLS OXIMETRY 1: CPT

## 2022-08-01 PROCEDURE — 33508 ENDOSCOPIC VEIN HARVEST: CPT | Performed by: THORACIC SURGERY (CARDIOTHORACIC VASCULAR SURGERY)

## 2022-08-01 PROCEDURE — 87635 SARS-COV-2 COVID-19 AMP PRB: CPT

## 2022-08-01 PROCEDURE — 33518 CABG ARTERY-VEIN TWO: CPT | Performed by: THORACIC SURGERY (CARDIOTHORACIC VASCULAR SURGERY)

## 2022-08-01 DEVICE — CLIP MED SUTURE LESS 40 MM SYS 1 HND STRL ATRICLIP FLX V: Type: IMPLANTABLE DEVICE | Status: FUNCTIONAL

## 2022-08-01 RX ORDER — 0.9 % SODIUM CHLORIDE 0.9 %
500 INTRAVENOUS SOLUTION INTRAVENOUS CONTINUOUS PRN
Status: DISCONTINUED | OUTPATIENT
Start: 2022-08-01 | End: 2022-08-05 | Stop reason: HOSPADM

## 2022-08-01 RX ORDER — SODIUM CHLORIDE 9 MG/ML
INJECTION, SOLUTION INTRAVENOUS CONTINUOUS PRN
Status: DISCONTINUED | OUTPATIENT
Start: 2022-08-01 | End: 2022-08-01 | Stop reason: SDUPTHER

## 2022-08-01 RX ORDER — OXYCODONE HYDROCHLORIDE 5 MG/1
10 TABLET ORAL EVERY 4 HOURS PRN
Status: DISCONTINUED | OUTPATIENT
Start: 2022-08-01 | End: 2022-08-05 | Stop reason: HOSPADM

## 2022-08-01 RX ORDER — FENTANYL CITRATE 50 UG/ML
INJECTION, SOLUTION INTRAMUSCULAR; INTRAVENOUS PRN
Status: DISCONTINUED | OUTPATIENT
Start: 2022-08-01 | End: 2022-08-01 | Stop reason: SDUPTHER

## 2022-08-01 RX ORDER — FUROSEMIDE 10 MG/ML
20 INJECTION INTRAMUSCULAR; INTRAVENOUS 2 TIMES DAILY
Status: DISCONTINUED | OUTPATIENT
Start: 2022-08-02 | End: 2022-08-05 | Stop reason: HOSPADM

## 2022-08-01 RX ORDER — CALCIUM CHLORIDE 100 MG/ML
INJECTION INTRAVENOUS; INTRAVENTRICULAR PRN
Status: DISCONTINUED | OUTPATIENT
Start: 2022-08-01 | End: 2022-08-01 | Stop reason: SDUPTHER

## 2022-08-01 RX ORDER — MAGNESIUM SULFATE IN WATER 40 MG/ML
2000 INJECTION, SOLUTION INTRAVENOUS PRN
Status: DISCONTINUED | OUTPATIENT
Start: 2022-08-01 | End: 2022-08-05 | Stop reason: HOSPADM

## 2022-08-01 RX ORDER — MEPERIDINE HYDROCHLORIDE 50 MG/ML
25 INJECTION INTRAMUSCULAR; INTRAVENOUS; SUBCUTANEOUS
Status: DISPENSED | OUTPATIENT
Start: 2022-08-01 | End: 2022-08-01

## 2022-08-01 RX ORDER — METOCLOPRAMIDE HYDROCHLORIDE 5 MG/ML
INJECTION INTRAMUSCULAR; INTRAVENOUS PRN
Status: DISCONTINUED | OUTPATIENT
Start: 2022-08-01 | End: 2022-08-01 | Stop reason: SDUPTHER

## 2022-08-01 RX ORDER — CALCIUM CHLORIDE 100 MG/ML
INJECTION INTRAVENOUS; INTRAVENTRICULAR
Status: COMPLETED | OUTPATIENT
Start: 2022-08-01 | End: 2022-08-01

## 2022-08-01 RX ORDER — FONDAPARINUX SODIUM 2.5 MG/.5ML
2.5 INJECTION SUBCUTANEOUS DAILY
Status: DISCONTINUED | OUTPATIENT
Start: 2022-08-02 | End: 2022-08-05 | Stop reason: HOSPADM

## 2022-08-01 RX ORDER — ONDANSETRON 2 MG/ML
INJECTION INTRAMUSCULAR; INTRAVENOUS PRN
Status: DISCONTINUED | OUTPATIENT
Start: 2022-08-01 | End: 2022-08-01 | Stop reason: SDUPTHER

## 2022-08-01 RX ORDER — ONDANSETRON 4 MG/1
4 TABLET, ORALLY DISINTEGRATING ORAL EVERY 8 HOURS PRN
Status: DISCONTINUED | OUTPATIENT
Start: 2022-08-01 | End: 2022-08-05 | Stop reason: HOSPADM

## 2022-08-01 RX ORDER — INSULIN GLARGINE 100 [IU]/ML
0.15 INJECTION, SOLUTION SUBCUTANEOUS NIGHTLY
Status: DISCONTINUED | OUTPATIENT
Start: 2022-08-02 | End: 2022-08-03

## 2022-08-01 RX ORDER — SODIUM CHLORIDE 0.9 % (FLUSH) 0.9 %
5-40 SYRINGE (ML) INJECTION EVERY 12 HOURS SCHEDULED
Status: DISCONTINUED | OUTPATIENT
Start: 2022-08-01 | End: 2022-08-05 | Stop reason: HOSPADM

## 2022-08-01 RX ORDER — MIDAZOLAM HYDROCHLORIDE 5 MG/ML
INJECTION INTRAMUSCULAR; INTRAVENOUS PRN
Status: DISCONTINUED | OUTPATIENT
Start: 2022-08-01 | End: 2022-08-01 | Stop reason: SDUPTHER

## 2022-08-01 RX ORDER — METOPROLOL TARTRATE 5 MG/5ML
2.5 INJECTION INTRAVENOUS EVERY 10 MIN PRN
Status: DISCONTINUED | OUTPATIENT
Start: 2022-08-01 | End: 2022-08-02

## 2022-08-01 RX ORDER — HYDRALAZINE HYDROCHLORIDE 20 MG/ML
5 INJECTION INTRAMUSCULAR; INTRAVENOUS EVERY 5 MIN PRN
Status: DISCONTINUED | OUTPATIENT
Start: 2022-08-01 | End: 2022-08-02

## 2022-08-01 RX ORDER — MIDAZOLAM HYDROCHLORIDE 2 MG/2ML
2 INJECTION, SOLUTION INTRAMUSCULAR; INTRAVENOUS ONCE
Status: COMPLETED | OUTPATIENT
Start: 2022-08-01 | End: 2022-08-01

## 2022-08-01 RX ORDER — LISINOPRIL 5 MG/1
2.5 TABLET ORAL
Status: DISCONTINUED | OUTPATIENT
Start: 2022-08-05 | End: 2022-08-05 | Stop reason: HOSPADM

## 2022-08-01 RX ORDER — DEXTROSE MONOHYDRATE 100 MG/ML
INJECTION, SOLUTION INTRAVENOUS CONTINUOUS PRN
Status: DISCONTINUED | OUTPATIENT
Start: 2022-08-01 | End: 2022-08-05 | Stop reason: HOSPADM

## 2022-08-01 RX ORDER — SODIUM CHLORIDE 9 MG/ML
INJECTION, SOLUTION INTRAVENOUS CONTINUOUS
Status: DISCONTINUED | OUTPATIENT
Start: 2022-08-01 | End: 2022-08-04

## 2022-08-01 RX ORDER — FUROSEMIDE 10 MG/ML
40 INJECTION INTRAMUSCULAR; INTRAVENOUS
Status: ACTIVE | OUTPATIENT
Start: 2022-08-01 | End: 2022-08-01

## 2022-08-01 RX ORDER — ASPIRIN 300 MG/1
300 SUPPOSITORY RECTAL ONCE
Status: DISCONTINUED | OUTPATIENT
Start: 2022-08-01 | End: 2022-08-02

## 2022-08-01 RX ORDER — ALBUTEROL SULFATE 90 UG/1
2 AEROSOL, METERED RESPIRATORY (INHALATION) EVERY 4 HOURS PRN
Status: DISCONTINUED | OUTPATIENT
Start: 2022-08-01 | End: 2022-08-02

## 2022-08-01 RX ORDER — OXYCODONE HYDROCHLORIDE 5 MG/1
5 TABLET ORAL EVERY 4 HOURS PRN
Status: DISCONTINUED | OUTPATIENT
Start: 2022-08-01 | End: 2022-08-05 | Stop reason: HOSPADM

## 2022-08-01 RX ORDER — BUPIVACAINE HYDROCHLORIDE 2.5 MG/ML
INJECTION, SOLUTION EPIDURAL; INFILTRATION; INTRACAUDAL
Status: COMPLETED | OUTPATIENT
Start: 2022-08-01 | End: 2022-08-01

## 2022-08-01 RX ORDER — MILRINONE LACTATE 0.2 MG/ML
0.38 INJECTION, SOLUTION INTRAVENOUS CONTINUOUS PRN
Status: DISCONTINUED | OUTPATIENT
Start: 2022-08-01 | End: 2022-08-02

## 2022-08-01 RX ORDER — ESMOLOL HYDROCHLORIDE 10 MG/ML
5 INJECTION INTRAVENOUS ONCE
Status: COMPLETED | OUTPATIENT
Start: 2022-08-01 | End: 2022-08-01

## 2022-08-01 RX ORDER — POTASSIUM CHLORIDE 750 MG/1
10 TABLET, FILM COATED, EXTENDED RELEASE ORAL
Status: DISCONTINUED | OUTPATIENT
Start: 2022-08-02 | End: 2022-08-05 | Stop reason: HOSPADM

## 2022-08-01 RX ORDER — ALBUTEROL SULFATE 2.5 MG/3ML
2.5 SOLUTION RESPIRATORY (INHALATION)
Status: DISCONTINUED | OUTPATIENT
Start: 2022-08-01 | End: 2022-08-01

## 2022-08-01 RX ORDER — LANOLIN ALCOHOL/MO/W.PET/CERES
400 CREAM (GRAM) TOPICAL 2 TIMES DAILY
Status: DISCONTINUED | OUTPATIENT
Start: 2022-08-02 | End: 2022-08-05 | Stop reason: HOSPADM

## 2022-08-01 RX ORDER — POTASSIUM CHLORIDE 29.8 MG/ML
20 INJECTION INTRAVENOUS PRN
Status: DISCONTINUED | OUTPATIENT
Start: 2022-08-01 | End: 2022-08-05 | Stop reason: HOSPADM

## 2022-08-01 RX ORDER — AMINOCAPROIC ACID 250 MG/ML
INJECTION, SOLUTION INTRAVENOUS PRN
Status: DISCONTINUED | OUTPATIENT
Start: 2022-08-01 | End: 2022-08-01 | Stop reason: SDUPTHER

## 2022-08-01 RX ORDER — DOBUTAMINE HYDROCHLORIDE 200 MG/100ML
2-10 INJECTION INTRAVENOUS CONTINUOUS PRN
Status: DISCONTINUED | OUTPATIENT
Start: 2022-08-01 | End: 2022-08-02

## 2022-08-01 RX ORDER — ONDANSETRON 2 MG/ML
4 INJECTION INTRAMUSCULAR; INTRAVENOUS EVERY 6 HOURS PRN
Status: DISCONTINUED | OUTPATIENT
Start: 2022-08-01 | End: 2022-08-05 | Stop reason: HOSPADM

## 2022-08-01 RX ORDER — ALBUMIN, HUMAN INJ 5% 5 %
SOLUTION INTRAVENOUS PRN
Status: DISCONTINUED | OUTPATIENT
Start: 2022-08-01 | End: 2022-08-01 | Stop reason: SDUPTHER

## 2022-08-01 RX ORDER — DEXMEDETOMIDINE HYDROCHLORIDE 4 UG/ML
.1-1.5 INJECTION, SOLUTION INTRAVENOUS CONTINUOUS
Status: DISCONTINUED | OUTPATIENT
Start: 2022-08-01 | End: 2022-08-02

## 2022-08-01 RX ORDER — LIDOCAINE HYDROCHLORIDE 20 MG/ML
INJECTION, SOLUTION INTRAVENOUS PRN
Status: DISCONTINUED | OUTPATIENT
Start: 2022-08-01 | End: 2022-08-01 | Stop reason: SDUPTHER

## 2022-08-01 RX ORDER — PROTAMINE SULFATE 10 MG/ML
INJECTION, SOLUTION INTRAVENOUS PRN
Status: DISCONTINUED | OUTPATIENT
Start: 2022-08-01 | End: 2022-08-01 | Stop reason: SDUPTHER

## 2022-08-01 RX ORDER — NOREPINEPHRINE BIT/0.9 % NACL 16MG/250ML
1-100 INFUSION BOTTLE (ML) INTRAVENOUS CONTINUOUS PRN
Status: COMPLETED | OUTPATIENT
Start: 2022-08-01 | End: 2022-08-01

## 2022-08-01 RX ORDER — FAMOTIDINE 10 MG/ML
INJECTION, SOLUTION INTRAVENOUS PRN
Status: DISCONTINUED | OUTPATIENT
Start: 2022-08-01 | End: 2022-08-01 | Stop reason: SDUPTHER

## 2022-08-01 RX ORDER — FENTANYL CITRATE 50 UG/ML
25 INJECTION, SOLUTION INTRAMUSCULAR; INTRAVENOUS
Status: DISCONTINUED | OUTPATIENT
Start: 2022-08-01 | End: 2022-08-03

## 2022-08-01 RX ORDER — PROTAMINE SULFATE 10 MG/ML
50 INJECTION, SOLUTION INTRAVENOUS
Status: DISPENSED | OUTPATIENT
Start: 2022-08-01 | End: 2022-08-01

## 2022-08-01 RX ORDER — PROPOFOL 10 MG/ML
INJECTION, EMULSION INTRAVENOUS PRN
Status: DISCONTINUED | OUTPATIENT
Start: 2022-08-01 | End: 2022-08-01 | Stop reason: SDUPTHER

## 2022-08-01 RX ORDER — NOREPINEPHRINE BIT/0.9 % NACL 16MG/250ML
1-100 INFUSION BOTTLE (ML) INTRAVENOUS CONTINUOUS PRN
Status: DISCONTINUED | OUTPATIENT
Start: 2022-08-01 | End: 2022-08-02

## 2022-08-01 RX ORDER — VECURONIUM BROMIDE 1 MG/ML
INJECTION, POWDER, LYOPHILIZED, FOR SOLUTION INTRAVENOUS PRN
Status: DISCONTINUED | OUTPATIENT
Start: 2022-08-01 | End: 2022-08-01 | Stop reason: SDUPTHER

## 2022-08-01 RX ORDER — SENNA AND DOCUSATE SODIUM 50; 8.6 MG/1; MG/1
1 TABLET, FILM COATED ORAL 2 TIMES DAILY
Status: DISCONTINUED | OUTPATIENT
Start: 2022-08-01 | End: 2022-08-05 | Stop reason: HOSPADM

## 2022-08-01 RX ORDER — HEPARIN SODIUM 1000 [USP'U]/ML
INJECTION, SOLUTION INTRAVENOUS; SUBCUTANEOUS PRN
Status: DISCONTINUED | OUTPATIENT
Start: 2022-08-01 | End: 2022-08-01 | Stop reason: SDUPTHER

## 2022-08-01 RX ORDER — ALBUTEROL SULFATE 90 UG/1
2 AEROSOL, METERED RESPIRATORY (INHALATION) EVERY 6 HOURS PRN
Status: DISCONTINUED | OUTPATIENT
Start: 2022-08-01 | End: 2022-08-01

## 2022-08-01 RX ORDER — ACETAMINOPHEN 500 MG
1000 TABLET ORAL EVERY 6 HOURS
Status: DISCONTINUED | OUTPATIENT
Start: 2022-08-01 | End: 2022-08-05 | Stop reason: HOSPADM

## 2022-08-01 RX ORDER — ALBUTEROL SULFATE 2.5 MG/3ML
2.5 SOLUTION RESPIRATORY (INHALATION) 2 TIMES DAILY
Status: DISCONTINUED | OUTPATIENT
Start: 2022-08-01 | End: 2022-08-02

## 2022-08-01 RX ORDER — SODIUM CHLORIDE 0.9 % (FLUSH) 0.9 %
5-40 SYRINGE (ML) INJECTION PRN
Status: DISCONTINUED | OUTPATIENT
Start: 2022-08-01 | End: 2022-08-05 | Stop reason: HOSPADM

## 2022-08-01 RX ORDER — NITROGLYCERIN 20 MG/100ML
5-300 INJECTION INTRAVENOUS CONTINUOUS PRN
Status: DISCONTINUED | OUTPATIENT
Start: 2022-08-01 | End: 2022-08-02

## 2022-08-01 RX ORDER — MAGNESIUM HYDROXIDE 1200 MG/15ML
LIQUID ORAL CONTINUOUS PRN
Status: COMPLETED | OUTPATIENT
Start: 2022-08-01 | End: 2022-08-01

## 2022-08-01 RX ORDER — DIPHENHYDRAMINE HCL 25 MG
25 TABLET ORAL NIGHTLY PRN
Status: DISCONTINUED | OUTPATIENT
Start: 2022-08-02 | End: 2022-08-05 | Stop reason: HOSPADM

## 2022-08-01 RX ORDER — MAGNESIUM SULFATE HEPTAHYDRATE 500 MG/ML
INJECTION, SOLUTION INTRAMUSCULAR; INTRAVENOUS PRN
Status: DISCONTINUED | OUTPATIENT
Start: 2022-08-01 | End: 2022-08-01 | Stop reason: SDUPTHER

## 2022-08-01 RX ORDER — PANTOPRAZOLE SODIUM 40 MG/1
40 TABLET, DELAYED RELEASE ORAL DAILY
Status: DISCONTINUED | OUTPATIENT
Start: 2022-08-02 | End: 2022-08-05 | Stop reason: HOSPADM

## 2022-08-01 RX ORDER — SODIUM CHLORIDE 9 MG/ML
INJECTION, SOLUTION INTRAVENOUS PRN
Status: DISCONTINUED | OUTPATIENT
Start: 2022-08-01 | End: 2022-08-05 | Stop reason: HOSPADM

## 2022-08-01 RX ORDER — ALBUMIN, HUMAN INJ 5% 5 %
25 SOLUTION INTRAVENOUS PRN
Status: DISCONTINUED | OUTPATIENT
Start: 2022-08-01 | End: 2022-08-05 | Stop reason: HOSPADM

## 2022-08-01 RX ADMIN — MELATONIN TAB 3 MG 3 MG: 3 TAB at 20:51

## 2022-08-01 RX ADMIN — ESMOLOL HYDROCHLORIDE 5 MG: 10 INJECTION, SOLUTION INTRAVENOUS at 06:47

## 2022-08-01 RX ADMIN — FENTANYL CITRATE 25 MCG: 50 INJECTION, SOLUTION INTRAMUSCULAR; INTRAVENOUS at 20:44

## 2022-08-01 RX ADMIN — SODIUM CHLORIDE 3 UNITS/HR: 9 INJECTION, SOLUTION INTRAVENOUS at 12:56

## 2022-08-01 RX ADMIN — MIDAZOLAM HYDROCHLORIDE 2 MG: 1 INJECTION, SOLUTION INTRAMUSCULAR; INTRAVENOUS at 06:14

## 2022-08-01 RX ADMIN — Medication 10 ML: at 20:15

## 2022-08-01 RX ADMIN — MIDAZOLAM 1 MG: 5 INJECTION INTRAMUSCULAR; INTRAVENOUS at 06:47

## 2022-08-01 RX ADMIN — DEXMEDETOMIDINE HYDROCHLORIDE 0.2 MCG/KG/HR: 400 INJECTION INTRAVENOUS at 12:54

## 2022-08-01 RX ADMIN — Medication 8 MCG/MIN: at 12:53

## 2022-08-01 RX ADMIN — MIDAZOLAM 1 MG: 5 INJECTION INTRAMUSCULAR; INTRAVENOUS at 07:01

## 2022-08-01 RX ADMIN — POTASSIUM CHLORIDE 20 MEQ: 29.8 INJECTION, SOLUTION INTRAVENOUS at 12:52

## 2022-08-01 RX ADMIN — OXYCODONE 10 MG: 5 TABLET ORAL at 20:09

## 2022-08-01 RX ADMIN — HEPARIN SODIUM 3000 UNITS: 1000 INJECTION INTRAVENOUS; SUBCUTANEOUS at 07:32

## 2022-08-01 RX ADMIN — AMINOCAPROIC ACID 5000 MG: 250 INJECTION, SOLUTION INTRAVENOUS at 07:01

## 2022-08-01 RX ADMIN — FENTANYL CITRATE 100 MCG: 50 INJECTION, SOLUTION INTRAMUSCULAR; INTRAVENOUS at 10:23

## 2022-08-01 RX ADMIN — FENTANYL CITRATE 50 MCG: 50 INJECTION, SOLUTION INTRAMUSCULAR; INTRAVENOUS at 06:47

## 2022-08-01 RX ADMIN — FAMOTIDINE 20 MG: 10 INJECTION, SOLUTION INTRAVENOUS at 07:32

## 2022-08-01 RX ADMIN — SENNOSIDES AND DOCUSATE SODIUM 1 TABLET: 50; 8.6 TABLET ORAL at 20:10

## 2022-08-01 RX ADMIN — MIDAZOLAM 5 MG: 5 INJECTION INTRAMUSCULAR; INTRAVENOUS at 09:10

## 2022-08-01 RX ADMIN — PROPOFOL 100 MG: 10 INJECTION, EMULSION INTRAVENOUS at 07:06

## 2022-08-01 RX ADMIN — SODIUM CHLORIDE: 9 INJECTION, SOLUTION INTRAVENOUS at 07:01

## 2022-08-01 RX ADMIN — LIDOCAINE HYDROCHLORIDE 100 MG: 20 INJECTION, SOLUTION INTRAVENOUS at 07:01

## 2022-08-01 RX ADMIN — HYDROCORTISONE SODIUM SUCCINATE 100 MG: 100 INJECTION, POWDER, FOR SOLUTION INTRAMUSCULAR; INTRAVENOUS at 07:38

## 2022-08-01 RX ADMIN — MIDAZOLAM 2 MG: 5 INJECTION INTRAMUSCULAR; INTRAVENOUS at 10:23

## 2022-08-01 RX ADMIN — HEPARIN SODIUM 3000 UNITS: 1000 INJECTION INTRAVENOUS; SUBCUTANEOUS at 08:42

## 2022-08-01 RX ADMIN — FENTANYL CITRATE 200 MCG: 50 INJECTION, SOLUTION INTRAMUSCULAR; INTRAVENOUS at 09:10

## 2022-08-01 RX ADMIN — PANTOPRAZOLE SODIUM 40 MG: 40 INJECTION, POWDER, FOR SOLUTION INTRAVENOUS at 05:12

## 2022-08-01 RX ADMIN — GABAPENTIN 600 MG: 300 CAPSULE ORAL at 05:12

## 2022-08-01 RX ADMIN — FENTANYL CITRATE 150 MCG: 50 INJECTION, SOLUTION INTRAMUSCULAR; INTRAVENOUS at 07:06

## 2022-08-01 RX ADMIN — SODIUM CHLORIDE: 9 INJECTION, SOLUTION INTRAVENOUS at 12:45

## 2022-08-01 RX ADMIN — CEFAZOLIN 2000 MG: 2 INJECTION, POWDER, FOR SOLUTION INTRAMUSCULAR; INTRAVENOUS at 11:03

## 2022-08-01 RX ADMIN — ACETAMINOPHEN 1000 MG: 500 TABLET ORAL at 18:40

## 2022-08-01 RX ADMIN — SODIUM BICARBONATE 50 MEQ: 84 INJECTION, SOLUTION INTRAVENOUS at 10:25

## 2022-08-01 RX ADMIN — CEFAZOLIN 2000 MG: 2 INJECTION, POWDER, FOR SOLUTION INTRAMUSCULAR; INTRAVENOUS at 20:13

## 2022-08-01 RX ADMIN — CEFAZOLIN 2000 MG: 2 INJECTION, POWDER, FOR SOLUTION INTRAMUSCULAR; INTRAVENOUS at 07:03

## 2022-08-01 RX ADMIN — PROTAMINE SULFATE 400 MG: 10 INJECTION, SOLUTION INTRAVENOUS at 11:15

## 2022-08-01 RX ADMIN — AMINOCAPROIC ACID 1 G/HR: 250 INJECTION, SOLUTION INTRAVENOUS at 07:02

## 2022-08-01 RX ADMIN — MAGNESIUM SULFATE HEPTAHYDRATE 1 G: 500 INJECTION, SOLUTION INTRAMUSCULAR; INTRAVENOUS at 07:38

## 2022-08-01 RX ADMIN — ALBUMIN (HUMAN) 250 ML: 12.5 INJECTION, SOLUTION INTRAVENOUS at 11:20

## 2022-08-01 RX ADMIN — MUPIROCIN: 20 OINTMENT TOPICAL at 20:11

## 2022-08-01 RX ADMIN — ASPIRIN 325 MG: 325 TABLET, COATED ORAL at 18:42

## 2022-08-01 RX ADMIN — MIDAZOLAM 1 MG: 5 INJECTION INTRAMUSCULAR; INTRAVENOUS at 06:39

## 2022-08-01 RX ADMIN — Medication 0.5 MCG/MIN: at 07:31

## 2022-08-01 RX ADMIN — DIAZEPAM 5 MG: 5 TABLET ORAL at 20:51

## 2022-08-01 RX ADMIN — SODIUM CHLORIDE 2 UNITS/HR: 9 INJECTION, SOLUTION INTRAVENOUS at 07:16

## 2022-08-01 RX ADMIN — OXYCODONE 10 MG: 5 TABLET ORAL at 15:54

## 2022-08-01 RX ADMIN — MORPHINE SULFATE 2 MG: 2 INJECTION, SOLUTION INTRAMUSCULAR; INTRAVENOUS at 23:10

## 2022-08-01 RX ADMIN — VANCOMYCIN HYDROCHLORIDE 1500 MG: 10 INJECTION, POWDER, LYOPHILIZED, FOR SOLUTION INTRAVENOUS at 18:34

## 2022-08-01 RX ADMIN — CLONAZEPAM 0.5 MG: 1 TABLET ORAL at 21:48

## 2022-08-01 RX ADMIN — CLONAZEPAM 0.5 MG: 1 TABLET ORAL at 00:27

## 2022-08-01 RX ADMIN — DESMOPRESSIN ACETATE 16 MCG: 4 SOLUTION INTRAVENOUS at 11:20

## 2022-08-01 RX ADMIN — VECURONIUM BROMIDE 10 MG: 1 INJECTION, POWDER, LYOPHILIZED, FOR SOLUTION INTRAVENOUS at 07:13

## 2022-08-01 RX ADMIN — CALCIUM CHLORIDE 0.5 G: 100 INJECTION INTRAVENOUS; INTRAVENTRICULAR at 11:12

## 2022-08-01 RX ADMIN — ALBUTEROL SULFATE 2.5 MG: 2.5 SOLUTION RESPIRATORY (INHALATION) at 20:29

## 2022-08-01 RX ADMIN — ONDANSETRON 4 MG: 2 INJECTION INTRAMUSCULAR; INTRAVENOUS at 07:32

## 2022-08-01 RX ADMIN — SODIUM CHLORIDE, POTASSIUM CHLORIDE, SODIUM LACTATE AND CALCIUM CHLORIDE: 600; 310; 30; 20 INJECTION, SOLUTION INTRAVENOUS at 05:24

## 2022-08-01 RX ADMIN — SODIUM BICARBONATE 50 MEQ: 84 INJECTION, SOLUTION INTRAVENOUS at 09:14

## 2022-08-01 RX ADMIN — DEXMEDETOMIDINE HYDROCHLORIDE 0.4 MCG/KG/HR: 400 INJECTION INTRAVENOUS at 07:17

## 2022-08-01 RX ADMIN — CALCIUM CHLORIDE 0.5 G: 100 INJECTION INTRAVENOUS; INTRAVENTRICULAR at 11:03

## 2022-08-01 RX ADMIN — FENTANYL CITRATE 25 MCG: 50 INJECTION, SOLUTION INTRAMUSCULAR; INTRAVENOUS at 15:17

## 2022-08-01 RX ADMIN — METOCLOPRAMIDE 10 MG: 5 INJECTION, SOLUTION INTRAMUSCULAR; INTRAVENOUS at 07:38

## 2022-08-01 RX ADMIN — VANCOMYCIN HYDROCHLORIDE 1500 MG: 1 INJECTION, POWDER, LYOPHILIZED, FOR SOLUTION INTRAVENOUS at 06:57

## 2022-08-01 ASSESSMENT — PAIN SCALES - GENERAL
PAINLEVEL_OUTOF10: 8
PAINLEVEL_OUTOF10: 4
PAINLEVEL_OUTOF10: 9
PAINLEVEL_OUTOF10: 9

## 2022-08-01 ASSESSMENT — PAIN DESCRIPTION - LOCATION
LOCATION: INCISION;CHEST
LOCATION: CHEST;STERNUM
LOCATION: CHEST;INCISION
LOCATION: INCISION;CHEST
LOCATION: INCISION;CHEST

## 2022-08-01 ASSESSMENT — PAIN DESCRIPTION - DESCRIPTORS: DESCRIPTORS: ACHING

## 2022-08-01 ASSESSMENT — PAIN DESCRIPTION - ORIENTATION: ORIENTATION: MID

## 2022-08-01 NOTE — ANESTHESIA PROCEDURE NOTES
Procedure Performed: TYLER       Start Time:  8/1/2022 7:35 AM       End Time:   8/1/2022 8:05 AM    Preanesthesia Checklist:  Patient identified, IV assessed, risks and benefits discussed, monitors and equipment assessed, procedure being performed at surgeon's request and anesthesia consent obtained. General Procedure Information  Diagnostic Indications for Echo:  assessment of surgical repair, hemodynamic monitoring and assessment of valve function  Physician Requesting Echo: Justin Graves MD  Location performed:  OR  Intubated  Bite block placed  Heart visualized  Probe Insertion:  Easy  Probe Type:  3D and mulitplane  Modalities:  3D, color flow mapping, pulse wave Doppler and continuous wave Doppler    Echocardiographic and Doppler Measurements    Ventricles    Right Ventricle:  Cavity size normal.  Hypertrophy not present. Thrombus not present. Global function normal.  Ejection Fraction 40%. Left Ventricle:  Cavity size normal.  Hypertrophy not present. Thrombus not present. Global Function normal.  Ejection Fraction 55%. Ventricular Regional Function:  1- Basal Anteroseptal:  normal  2- Basal Anterior:  normal  3- Basal Anterolateral:  normal  4- Basal Inferolateral:  normal  5- Basal Inferior:  normal  6- Basal Inferoseptal:  normal  7- Mid Anteroseptal:  normal  8- Mid Anterior:  normal  9- Mid Anterolateral:  hypokinetic  10- Mid Inferolateral:  hypokinetic  11- Mid Inferior:  normal  12- Mid Inferoseptal:  normal  13- Apical Anterior:  normal  14- Apical Lateral:  hypokinetic  15- Apical Inferior:  hypokinetic  16- Apical Septal:  normal  17- [place]:  normal      Valves    Aortic Valve: Annulus normal.  Stenosis not present. Regurgitation none. Leaflets normal.  Leaflet motions normal.      Mitral Valve: Annulus normal.  Stenosis not present. Regurgitation none. Leaflets normal.  Leaflet motions normal.      Tricuspid Valve: Annulus normal.  Stenosis not present.   Regurgitation none.  Leaflets normal.  Leaflet motions normal.    Pulmonic Valve: Annulus normal.  Stenosis not present. Regurgitation none. Aorta    Ascending Aorta:  Size normal.  Dissection not present. Aortic Arch:  Size normal.  Dissection not present. Descending Aorta:  Size normal.  Dissection not present. Atria    Right Atrium:  Size normal.  Spontaneous echo contrast not present. Thrombus not present. Tumor not present. Device not present. Left Atrium:  Size normal.  Spontaneous echo contrast not present. Thrombus not present. Tumor not present. Device not present.     Left atrial appendage normal.      Septa    Atrial Septum:  Intra-atrial septal morphology normal.      Ventricular Septum:  Intra-ventricular septum morphology normal.          Other Findings  Pericardium:  normal  Pleural Effusion:  none  Pulmonary Arteries:  normal  Pulmonary Venous Flow:  normal    Anesthesia Information  Performed Personally

## 2022-08-01 NOTE — PROGRESS NOTES
Weaning parameters initiated RSBI of 40, and NIF- 25. ABG within normal ranges. Pt extubated to 3L 96%. Will continue to monitor.

## 2022-08-01 NOTE — PROGRESS NOTES
Pt remains anxious about surgery. PRN medications given, extra time at bedside. Answered pt's questions. Denies any pain/discomfort.

## 2022-08-01 NOTE — RT PROTOCOL NOTE
RT Inhaler-Nebulizer Bronchodilator Protocol Note    There is a bronchodilator order in the chart from a provider indicating to follow the RT Bronchodilator Protocol and there is an Initiate RT Inhaler-Nebulizer Bronchodilator Protocol order as well (see protocol at bottom of note). CXR Findings:  XR CHEST PORTABLE    Result Date: 8/1/2022  1. Recent cardiac postoperative changes. The findings from the last RT Protocol Assessment were as follows:   History Pulmonary Disease: Smoker 15 pack years or more  Respiratory Pattern: Regular pattern and RR 12-20 bpm  Breath Sounds: Slightly diminished and/or crackles  Cough: Weak, non-productive  Indication for Bronchodilator Therapy:    Bronchodilator Assessment Score: 6    Aerosolized bronchodilator medication orders have been revised according to the RT Inhaler-Nebulizer Bronchodilator Protocol below. Respiratory Therapist to perform RT Therapy Protocol Assessment initially then follow the protocol. Repeat RT Therapy Protocol Assessment PRN for score 0-3 or on second treatment, BID, and PRN for scores above 3. No Indications - adjust the frequency to every 6 hours PRN wheezing or bronchospasm, if no treatments needed after 48 hours then discontinue using Per Protocol order mode. If indication present, adjust the RT bronchodilator orders based on the Bronchodilator Assessment Score as indicated below. Use Inhaler orders unless patient has one or more of the following: on home nebulizer, not able to hold breath for 10 seconds, is not alert and oriented, cannot activate and use MDI correctly, or respiratory rate 25 breaths per minute or more, then use the equivalent nebulizer order(s) with same Frequency and PRN reasons based on the score. If a patient is on this medication at home then do not decrease Frequency below that used at home.     0-3 - enter or revise RT bronchodilator order(s) to equivalent RT Bronchodilator order with Frequency of every 4 hours PRN for wheezing or increased work of breathing using Per Protocol order mode. 4-6 - enter or revise RT Bronchodilator order(s) to two equivalent RT bronchodilator orders with one order with BID Frequency and one order with Frequency of every 4 hours PRN wheezing or increased work of breathing using Per Protocol order mode. 7-10 - enter or revise RT Bronchodilator order(s) to two equivalent RT bronchodilator orders with one order with TID Frequency and one order with Frequency of every 4 hours PRN wheezing or increased work of breathing using Per Protocol order mode. 11-13 - enter or revise RT Bronchodilator order(s) to one equivalent RT bronchodilator order with QID Frequency and an Albuterol order with Frequency of every 4 hours PRN wheezing or increased work of breathing using Per Protocol order mode. Greater than 13 - enter or revise RT Bronchodilator order(s) to one equivalent RT bronchodilator order with every 4 hours Frequency and an Albuterol order with Frequency of every 2 hours PRN wheezing or increased work of breathing using Per Protocol order mode. RT to enter RT Home Evaluation for COPD & MDI Assessment order using Per Protocol order mode.     Electronically signed by Fadi Zacarias RCP on 8/1/2022 at 3:23 PM

## 2022-08-01 NOTE — ANESTHESIA PROCEDURE NOTES
Arterial Line:    An arterial line was placed using surface landmarks, in the pre-op for the following indication(s): continuous blood pressure monitoring and blood sampling needed. A 20 gauge (size), 1 and 3/8 inch (length), Arrow (type) catheter was placed, Seldinger technique used, into the right radial artery, secured by tape and Tegaderm. Anesthesia type: Local  Local infiltration: Topical and Injection    Events:  patient tolerated procedure well with no complications and EBL < 5mL. 8/1/2022 6:34 AM8/1/2022 6:39 AM  Anesthesiologist: Laura Hamlin MD  Performed: Anesthesiologist   Preanesthetic Checklist  Completed: patient identified, IV checked, site marked, risks and benefits discussed, surgical/procedural consents, equipment checked, pre-op evaluation, timeout performed, anesthesia consent given, oxygen available and monitors applied/VS acknowledged

## 2022-08-01 NOTE — PROGRESS NOTES
08/01/22 1523   RT Protocol   History Pulmonary Disease 1   Respiratory pattern 0   Breath sounds 2   Cough 3   Bronchodilator Assessment Score 6

## 2022-08-01 NOTE — ANESTHESIA PROCEDURE NOTES
Procedure Performed: TYLER       Start Time:  8/1/2022 11:13 AM       End Time:   8/1/2022 11:27 AM    Preanesthesia Checklist:  Patient identified, IV assessed, risks and benefits discussed, monitors and equipment assessed, procedure being performed at surgeon's request and anesthesia consent obtained. General Procedure Information  Diagnostic Indications for Echo:  assessment of surgical repair, hemodynamic monitoring and assessment of valve function  Physician Requesting Echo: Justin Graves MD  Location performed:  OR  Intubated  Bite block placed  Heart visualized  Probe Insertion:  Easy  Probe Type:  3D and mulitplane  Modalities:  3D, color flow mapping, pulse wave Doppler and continuous wave Doppler    Echocardiographic and Doppler Measurements    Ventricles    Right Ventricle:  Cavity size normal.  Hypertrophy not present. Thrombus not present. Global function normal.  Ejection Fraction 50%. Left Ventricle:  Cavity size normal.  Hypertrophy not present. Thrombus not present. Global Function normal.  Ejection Fraction 70%. Ventricular Regional Function:  1- Basal Anteroseptal:  normal  2- Basal Anterior:  normal  3- Basal Anterolateral:  normal  4- Basal Inferolateral:  normal  5- Basal Inferior:  normal  6- Basal Inferoseptal:  normal  7- Mid Anteroseptal:  normal  8- Mid Anterior:  normal  9- Mid Anterolateral:  normal  10- Mid Inferolateral:  normal  11- Mid Inferior:  normal  12- Mid Inferoseptal:  normal  13- Apical Anterior:  normal  14- Apical Lateral:  normal  15- Apical Inferior:  normal  16- Apical Septal:  normal  17- [place]:  normal      Valves    Aortic Valve: Annulus normal.  Stenosis not present. Regurgitation none. Leaflets normal.  Leaflet motions normal.      Mitral Valve: Annulus normal.  Stenosis not present. Regurgitation none. Leaflets normal.  Leaflet motions normal.      Tricuspid Valve: Annulus normal.  Stenosis not present. Regurgitation none.   Leaflets normal.  Leaflet motions normal.    Pulmonic Valve: Annulus normal.  Stenosis not present. Regurgitation none. Aorta    Ascending Aorta:  Size normal.  Dissection not present. Aortic Arch:  Size normal.  Dissection not present. Descending Aorta:  Size normal.  Dissection not present. Atria    Right Atrium:  Size normal.  Spontaneous echo contrast not present. Thrombus not present. Tumor not present. Device not present. Left Atrium:  Size normal.  Spontaneous echo contrast not present. Thrombus not present. Tumor not present. Device not present.     Left atrial appendage normal.      Septa    Atrial Septum:  Intra-atrial septal morphology normal.      Other atrial septal defect findings:       Left atrial appendage occluded    Ventricular Septum:  Intra-ventricular septum morphology normal.          Other Findings  Pericardium:  normal  Pleural Effusion:  none  Pulmonary Arteries:  normal  Pulmonary Venous Flow:  normal    Anesthesia Information  Performed Personally

## 2022-08-01 NOTE — PROGRESS NOTES
CC: [place], Severe 3V CAD, HTN, HLD, presternal sebaceous non infected cyst    No CP, No SOB overnight  HD stable, Electrically stable    Aox3  CTAB  Abd soft    CABG work up completed, studies reviewed    A/P: For urgent CABG/DEJAH clip/ presternal Sebaceous cyst excision today  All questions answered

## 2022-08-01 NOTE — OP NOTE
Operative Note      Patient: Mandy Adamson Sr. YOB: 1970  MRN: [id number]      Date of Procedure: 8/1/2022     Pre-Op Diagnosis: Coronary artery disease involving native heart, unspecified vessel or lesion type, unspecified whether angina present [I25.10]     Post-Op Diagnosis: Same       Procedure(s):  URGENT CABG CORONARY ARTERY BYPASS X 3 (LIMA GRAFT TO DISTAL LAD, VEIN GRAFT TO RCA, VEIN GRAFT TO OM1)   LIGATION DEJAH WITH 40MM ATRICLIP  EVH LEFT LEG  EXCISION OF PRESTERNAL SEBACEOUS CYST  TYLER  TOTAL CPB  INSERTION OF TEMPORARY VENTRICULAR PACING WIRES  DOPPLER BYPASS GRAFT FLOW VERIFICATION   5 LEVEL BILATERAL INTERCOSTAL NERVE BLOCK USING EXPAREL AND MARCAINE    CBP Time:   117 min         Aortic Cross Clamp Time:  95  min     Surgeon(s):  Justin Graves MD     Assistant:  Surgical Assistant: Rock Medley  First Assistant: Dashawn Jain RN  Physician Assistant: Anayeli Judd PA-C due to the complexity of the case he scrubbed in and did the [facility] and assisted with the closure     Anesthesia: General     Estimated Blood Loss (mL): Autotransfusion     Complications: None      Specimens:   ID Type Source Tests Collected by Time Destination   A : A) PRESTERNAL SEBACEOUS CYST Tissue Tissue SURGICAL PATHOLOGY Justin Graves MD 8/1/2022 4095        Implants:  Implant Name Type Inv.  Item Serial No.  Lot No. LRB No. Used Action   CLIP MED SUTURE LESS 40 MM SYS 1 HND STRL ATRICLIP FLX V - [id number]  CLIP MED SUTURE LESS 40 MM SYS 1 HND STRL ATRICLIP FLX V  [employer]- [id number] N/A 1 Implanted         Drains:   Chest Tube Anterior Pleural;Mediastinal 2 (Active)   Chest Tube Airleak Yes 08/01/22 1824   Status Continuous Suction 08/01/22 1245   Suction -20 cm H2O 08/01/22 1245   Y Connector Used No 08/01/22 1824   Drainage Description Sanguinous 08/01/22 1824   Dressing Status Clean, dry & intact 08/01/22 1824   Chest Tube Dressing Dry 08/01/22 1245   Site Assessment Clean, dry & intact 08/01/22 1824   Surrounding Skin Clean, dry & intact 08/01/22 1824   Output (ml) 5 ml 08/01/22 1824       Urinary Catheter Parra-Temperature (Active)   Catheter Indications Perioperative use for selected surgical procedures 08/01/22 1600   Site Assessment [place] 08/01/22 1600   Urine Color Yellow 08/01/22 1600   Urine Appearance Clear 08/01/22 1600   Collection Container Standard 08/01/22 1600   Securement Method Securing device (Describe) 08/01/22 1600   Catheter Care Completed Yes 08/01/22 1245   Catheter Best Practices  Drainage tube clipped to bed;Catheter secured to thigh; Tamper seal intact; Bag below bladder;Bag not on floor; Lack of dependent loop in tubing;Drainage bag less than half full 08/01/22 1600   Status Draining 08/01/22 1600   Output (mL) 100 mL 08/01/22 1824     Indications for the procedure:    Patient is a 52-year-old male with history of coronary artery disease and prior PCI's who presented with unstable angina and was found on cardiac cath to have severe three-vessel coronary artery disease. After standard preoperative work-up and risk stratification he was deemed an appropriate candidate for surgical revascularization with CABG      Findings: There was no pericardial adhesions or effusion, the ascending aorta was free of calcifications. There was no visible myocardial scar to suggest prior MI. The OM3 was a <1mm non graftable target, the  bifurcating Ramus was also < 1mm non graftable target. The OM1 was a 1.25mm target vessel of good moderate quality. The distal RCA was a 2.25mm target vessle of moderate quality. The distal LAD was a 1 mm small target vessel of moderate quality. The EF was 50% pre and 70% post procedure with improved lateral hypokinesis. The LIMA was of quiet small and of just adequate size and quality but  had excellent flow and was adequate to be used for by pass conduit. The vein was of adequate quality and caliber and good to be used for a conduit.  There was no flow into the DEJAH at the end of the procedure. There was excellent doppler flow on all bypass grafts at the end of the procedure. Detailed Description of Procedure:   After informed consent was obtained and placement of monitoring lines, the patient was brought to the operating room and was placed on the OR table in supine position. General  endotracheal anesthesia was induced, and a Parra catheter and TYLER probe were inserted. Preoperative antibiotics were administered. The patient was prepped and draped in the usual sterile fashion from chin to toes. A proper time out was performed. A standard median sternotomy was carried out. During this, a presternal sebaceous cyst was sharply excised using cautery through the same skin incision and was sent to pathology as a permanent specimen. There was no signs of infection of the sebaceous cyst. At the same time with endoscopic vein harvest from the left leg. At the end of the vein harvest, the vein was found to be of adequate caliber and quality to be used for bypass. The leg incisions were closed in layers in standard fashion. The LIMA retractor was inserted, and the LIMA was harvested as a pedicle. At the end of the LIMA harvest, full heparin dose was administered. The LIMA was transected distally and was found to have excellent flow. It was spatulated, wrapped in papaverine soaked sponge, clamped and set aside. Its distal stump was doubly ligated. The LIMA retractor was removed and a standard sternal retractor was inserted. The pericardium was opened in an inverted-T fashion and it was suspended with stay sutures. The aorta was inspected and palpated and was found normal without any calcifications. Standard aortic and right atrial cannulation was carried out in preparation for cardiopulmonary bypass. An antegrade DLP needle was placed on the ascending aorta. With adequate ACT, we commenced cardiopulmonary bypass.   We had excellent forward flow and drainage. The systemic temperature was allowed to drift down to 34 degrees centigrade. The coronary artery targets were marked on the epicardium. Aortic cross-clamp was applied, and prompt diastolic arrest was achieved with administration of 1.5 liters of antegrade cardioplegia. Cardiac standstill was maintained throughout the case with topical ice cooling and intermittent doses of  antegrade cardioplegia through the DLP needle and down through the vein grafts in 15-20 minutes intervals. Our attention was then turned to the distal RCA which was our first target. A 7 mm arteriotomy was performed and a spatulated vein graft was anastomosed to  the RCA with a running 8-0 Prolene suture. Before tying the sutures, all three anastomotic lumens were probed to ensure there was no compromise of the anastomosis. The heart was allowed to fill up with volume and the vein graft was measured and cut to length in order to reach the ascending aorta. A proximal anastomotic site was created on the ascending aorta with an 11 Blade followed by a 4mm punch. The first proximal aorto saphenous anastomosis to the RPDA was then carried out with a running 7-0 Prolene suture. Of note the graft to the RCA was brought out anteriorly and along the acute margin of the heart. Our attention was then turned to the lateral wall. The OM 3 and bifurcating ramus were very small and not graftable targets. The OM1 was exposed. A 7 mm arteriotomy was performed and the vein graft was anastomosed to the OM1 with a running 7-0 Prolene suture. Before tying the sutures, all three anastomotic lumens were probed to ensure there was no compromise of the anastomosis. The left atrial appendage was then measured and a 40 mm AtriCure clip was applied at the base of the left atrial appendage. The heart was then allowed to fill up with volume and the vein graft to the OM1  was measured and cut to length in order to reach the ascending aorta.   A second proximal anastomotic site was created on the ascending aorta with an 11 blade followed by a 4 mm punch. The second proximal aorto saphenous anastomosis was carried out with a running 6-0 Prolene suture. Systemic rewarming was commenced. Our attention was then turned to the LAD. The distal  LAD was exposed and was found a small 1 mm target vessel of poor to moderate quality. It accepted a 1 mm probe. The LIMA was brought into the field through a generous hole into the left pericardium and the LIMA to LAD distal anastomosis was carried out with a running 8-0 Prolene suture. Of note, the LIMA was also a small conduit given the patient's size but had adequate flow. Before tying all sutures all 3 anastomotic lumens were probed to ensure there was no compromise of anastomosis. Hemostasis was checked by releasing the atraumatic bulldog from the LIMA. The LIMA was tacked on the epicardium with interrupted 6-0 Prolene sutures. A hot shot  antegrade cardioplegia dose was administered, de airing maneuvers were carried out and the cross-clamp was removed. The heart started beating on its own accord; however, in sinus bradycardia. A bipolar ventricular pacing wire was placed on the diaphragmatic aspect of the right ventricle and the heart was paced at 80 beats per minute. After adequate time of rewarming and reperfusion, the patient was weaned off cardiopulmonary bypass with inotropic support. There was excellent Doppler flow on all bypass grafts. The heparin was reversed with protamine. All cardiopulmonary bypass circuit volume was returned to the patient and serial decannulation was carried out and all sutures were tied. Thorough hemostasis was achieved at the internal mammary bed, which was also treated with platelet poor gel. By this time went  the patient regained normal sinus rhythm and no more pacing was required. The pericardium was then re approximated with several interrupted sutures.  The sternal edges were treated with platelet rich gel and bilateral intercostal parasternal nerve block was carried out with a mixture of Exparel and Marcaine after thorough hemostasis was ensured. A 24 [place] drain was placed in the left pleural space and a  24 [place] Carri Juan drain was placed in the anterior mediastinum with its tip into the right pleural space which was widely opened and suctioned out. The sternum was then reapproximated with stainless steel wires. The sternotomy incision was then irrigated and closed in between layers in the standard fashion. A prevena incisional VAC was placed on the closed incision and was connected to its negative pressure system in the usual fashion. All needles, instruments and sponge counts were found to be correct.   The patient was brought into the intensive care unit in critical, but stable condition     Electronically signed by Lilliana Ospina MD on 8/1/2022 at 7:28 PM

## 2022-08-01 NOTE — BRIEF OP NOTE
Brief Postoperative Note      Patient: Leland Odom Sr. YOB: 1970  MRN: [id number]    Date of Procedure: 8/1/2022    Pre-Op Diagnosis: Coronary artery disease involving native heart, unspecified vessel or lesion type, unspecified whether angina present [I25.10]    Post-Op Diagnosis: Same       Procedure(s):  URGENT CABG CORONARY ARTERY BYPASS X 3, LIMA GRAFT TO DISTAL LAD, VEIN GRAFT TO RCA, VEIN GRAFT TO OM1, LIGATION DEJAH WITH 40MM ATRICLIP, EVH LEFT LEG, EXCISION OF PRESTERNAL SEBACEOUS CYST, TYLER, TOTAL CPB, INSERTION OF TEMPORARY VENTRICULAR PACING WIRES, DOPPLER BYPASS GRAFT FLOW VERIFICATION, 5 LEVEL BILATERAL INTERCOSTAL NERVE BLOCK USING EXPAREL AND MARCAINE  CBP Time:   117 min         Aortic Cross Clamp Time:  95  min    Surgeon(s):  Justin Graves MD    Assistant:  Surgical Assistant: Carlos Mae  First Assistant: Kevyn Maurer RN  Physician Assistant: Sherren Harris, PA-C due to the complexity of the case he scrubbed in and did the [facility] and assisted with the closure    Anesthesia: General    Estimated Blood Loss (mL): Autotransfusion    Complications: None    Specimens:   ID Type Source Tests Collected by Time Destination   A : A) PRESTERNAL SEBACEOUS CYST Tissue Tissue SURGICAL PATHOLOGY Justin Graves MD 8/1/2022 7134        Implants:  Implant Name Type Inv. Item Serial No.  Lot No. LRB No. Used Action   CLIP MED SUTURE LESS 40 MM SYS 1 HND STRL ATRICLIP FLX V - [id number]  CLIP MED SUTURE LESS 40 MM SYS 1 HND STRL ATRICLIP FLX V  [employer]- [id number] N/A 1 Implanted         Drains:   Chest Tube Anterior Pleural;Mediastinal 2 (Active)       Urinary Catheter Parra-Temperature (Active)       Findings: There was no pericardial adhesions or effusion, the ascending aorta was free of calcifications. There was no visible myocardial scar to suggest prior MI. The OM3 was a <1mm non graftable target, the  bifurcating Ramus was also < 1mm non graftable target. The OM1 was a 1.25mm target vessel of good moderate quality. The distal RCA was a 2.25mm target vessle of moderate quality. The distal LAD was a 1 mm small target vessel of moderate quality. The EF was 50% pre and 70% post procedure with improved lateral hypokinesis. The LIMA was of quiet small and of just adequate size and quality but  had excellent flow and was adequate to be used for by pass conduit. The vein was of adequate quality and caliber and good to be used for a conduit. There was no flow into the DEJAH at the end of the procedure. There was excellent doppler flow on all bypass grafts at the end of the procedure.       Electronically signed by Rian Reyes MD on 8/1/2022 at 11:55 AM

## 2022-08-01 NOTE — CARE COORDINATION
CM reviewed chart for d/c planning. Pt had CABG today. Will need hc services at discharge. Cm will monitor for pt's progress and any other needs.       Trena Ortiz RN, BSN  [phone]

## 2022-08-01 NOTE — PROGRESS NOTES
Patient admitted to CVU from [place] [address] and attached to ventilator and monitors. Report received from anesthesiologist.  Chest x-ray ordered. Labs drawn and sent. Assessment complete. Continue monitoring hemodynamics. Family let back to see patient. Visiting hours reviewed and all questions answered. Family aware of discharge class. Primary RN Antonieta/Maria Del Carmen.     [address]

## 2022-08-01 NOTE — ANESTHESIA PROCEDURE NOTES
Central Venous Line:    A central venous line was placed using surface landmarks, in the pre-op for the following indication(s): central venous access and CVP monitoring. 8/1/2022 6:41 AM8/1/2022 6:45 AM    Sterility preparation included the following: hand hygiene performed prior to procedure, maximum sterile barriers used and sterile technique used to drape from head to toe. The patient was placed in Trendelenburg position. The    The site was prepped with Chloraprep. A 9 Fr (size), introducer triple lumen was placed. During the procedure, the following specific steps were taken: target vein identified, needle advanced into vein and blood aspirated and guidewire advanced into vein. Intravenous verification was obtained by ultrasound and venous blood return. Post insertion care included: all ports aspirated, all ports flushed easily, guidewire removed intact, Biopatch applied, line sutured in place and dressing applied. During the procedure the patient experienced: patient tolerated procedure well with no complications. Outcomes: uncomplicated  Real-time US image taken/store: yes  Anesthesia type: generalA(n) oximetric, 7.5 (size) Pulmonary Artery Catheter (PAC) was placed through the Introducer CVL in the right internal jugular vein. The PAC placement was confirmed by pressure tracing changes. The patient experienced the following events during the procedure: patient tolerated procedure well with no complications. PA Cath placed?: Yes  Staffing  Anesthesiologist: Rafaela Roman MD  Preanesthetic Checklist  Completed: patient identified, timeout performed and monitors applied/VS acknowledged

## 2022-08-01 NOTE — ANESTHESIA POSTPROCEDURE EVALUATION
Department of Anesthesiology  Postprocedure Note    Patient: Kingston Babinski Sr. MRN: [id number]  YOB: 1970  Date of evaluation: 8/1/2022      Procedure Summary     Date: 08/01/22 Room / Location: [address]    Anesthesia Start: 5704 Anesthesia Stop: 9622    Procedure: URGENT CABG CORONARY ARTERY BYPASS X 3, LIMA GRAFT TO DISTAL LAD, VEIN GRAFT TO RCA, VEIN GRAFT TO OM1, LIGATION DJEAH WITH 40MM ATRICLIP, EVH LEFT LEG, EXCISION OF PRESTERNAL SEBACEOUS CYST, TYLER, TOTAL CPB, INSERTION OF TEMPORARY VENTRICULAR PACING WIRES, DOPPLER BYPASS GRAFT FLOW VERIFICATION, 5 LEVEL BILATERAL INTERCOSTAL NERVE BLOCK USING EXPAREL AND MARCAINE Diagnosis:       Coronary artery disease involving native heart, unspecified vessel or lesion type, unspecified whether angina present      (Coronary artery disease involving native heart, unspecified vessel or lesion type, unspecified whether angina present [I25.10])    Surgeons: Esperanza Aguilar MD Responsible Provider: Michael Pena MD    Anesthesia Type: general ASA Status: 4          Anesthesia Type: No value filed.     Tariq Phase I: Tariq Score: 10    Tariq Phase II:        Anesthesia Post Evaluation    Patient location during evaluation: ICU  Patient participation: complete - patient cannot participate  Level of consciousness: sedated and ventilated  Pain score: 2  Airway patency: patent  Nausea & Vomiting: no vomiting  Complications: no  Cardiovascular status: blood pressure returned to baseline  Respiratory status: intubated, acceptable and ventilator  Hydration status: euvolemic  Multimodal analgesia pain management approach

## 2022-08-01 NOTE — ANESTHESIA PRE PROCEDURE
Department of Anesthesiology  Preprocedure Note       Name:  Suni Escoto Sr.   Age:  47 y.o.  :  1970                                          MRN:  [id number]         Date:  2022      Surgeon: Nacho Romeo):  Justin Graves MD    Procedure: Procedure(s):  CABG CORONARY ARTERY BYPASS/POSS DEJAH/POSS TYLER    Medications prior to admission:   Prior to Admission medications    Medication Sig Start Date End Date Taking? Authorizing Provider   pantoprazole (PROTONIX) 40 MG tablet Take 40 mg by mouth daily 22   Historical Provider, MD   clonazePAM (KLONOPIN) 0.5 MG tablet Take 0.5 mg by mouth 2 times daily as needed. Historical Provider, MD   metoprolol tartrate (LOPRESSOR) 25 MG tablet Take 25 mg by mouth in the morning and 25 mg before bedtime. Pt takes 5 mg daily.     Historical Provider, MD   Acetylcysteine (NAC PO) Take by mouth    Historical Provider, MD   aspirin 81 MG EC tablet Take 1 tablet by mouth daily 17   Gerson Balderrama MD       Current medications:    Current Facility-Administered Medications   Medication Dose Route Frequency Provider Last Rate Last Admin    insulin regular (HUMULIN R;NOVOLIN R) 100 Units in sodium chloride 0.9 % 100 mL infusion  0.1 Units/kg/hr IntraVENous Continuous PRN Justin Graves MD        phenylephrine (FABIAN-SYNEPHRINE) 50 mg in dextrose 5 % 250 mL infusion  100 mcg/min IntraVENous Once PRN Justin Graves MD        aminocaproic acid (AMICAR) 5,000 mg in sodium chloride 0.9 % 250 mL infusion bolus  5,000 mg IntraVENous Once PRN Justin Garves MD        desmopressin (DDAVP) 16 mcg in sodium chloride 0.9 % 50 mL IVPB  16 mcg IntraVENous Once PRN Justin Graves MD        norepinephrine (LEVOPHED) 16 mg in sodium chloride 0.9 % 250 mL infusion  1-100 mcg/min IntraVENous Continuous PRN Justin Graves MD        lactated ringers infusion   IntraVENous Continuous Luly Lai PA-C 50 mL/hr at 22 0524 New Bag at 08/01/22 0524    ceFAZolin (ANCEF) 2,000 mg in dextrose 5 % 50 mL IVPB (mini-bag)  2,000 mg IntraVENous On Call to [address] [place] [address] MD Star        vancomycin (VANCOCIN) 1,500 mg in dextrose 5 % 250 mL IVPB  1,500 mg IntraVENous On Call to [address] [place] [address] MD Star        diazePAM (VALIUM) tablet 5 mg  5 mg Oral Q6H PRN Yuridia Barreto MD   5 mg at 07/31/22 2116    melatonin tablet 3 mg  3 mg Oral Daily PRN Yuridia Barreto MD   3 mg at 07/31/22 2116    aspirin EC tablet 81 mg  81 mg Oral Daily Yuridia Barreto MD   81 mg at 07/30/22 0848    clonazePAM (KLONOPIN) tablet 0.5 mg  0.5 mg Oral BID PRN Magalys Maurer MD   0.5 mg at 08/01/22 0027    metoprolol tartrate (LOPRESSOR) tablet 25 mg  25 mg Oral BID Magalys Maurer MD   25 mg at 07/31/22 2116    pantoprazole (PROTONIX) tablet 40 mg  40 mg Oral Daily Magalys Maurer MD   40 mg at 07/31/22 0841    sodium chloride flush 0.9 % injection 5-40 mL  5-40 mL IntraVENous 2 times per day Magalys Maurer MD   10 mL at 07/31/22 2136    sodium chloride flush 0.9 % injection 5-40 mL  5-40 mL IntraVENous PRN Yuridia Barreto MD        0.9 % sodium chloride infusion   IntraVENous PRN Yuridia Barreto MD        ondansetron (ZOFRAN-ODT) disintegrating tablet 4 mg  4 mg Oral Q8H PRN Yuridia Barreto MD        Or    ondansetron (ZOFRAN) injection 4 mg  4 mg IntraVENous Q6H PRN Yuridia Barreto MD        polyethylene glycol (GLYCOLAX) packet 17 g  17 g Oral Daily PRN Yuridia Barreto MD        morphine (PF) injection 2 mg  2 mg IntraVENous Q4H PRN Yuridia Barreto MD        enoxaparin Sodium (LOVENOX) injection 30 mg  30 mg SubCUTAneous BID Yuridia Barreto MD   30 mg at 07/31/22 2116    acetaminophen (TYLENOL) tablet 650 mg  650 mg Oral Q4H PRN Tan Gibbs MD   650 mg at 07/31/22 1007    nicotine (NICODERM CQ) 21 MG/24HR 1 patch  1 patch TransDERmal Daily Magalys Maurer MD   1 patch at 07/31/22 0841       Allergies:     Allergies   Allergen Reactions    Fenofibrate     Statins Crestor, pravachol       Problem List:    Patient Active Problem List   Diagnosis Code    Unstable angina ([facility] [address] I20.0    Coronary artery disease involving native coronary artery of native heart without angina pectoris I25.10    Essential hypertension I10    Mixed hyperlipidemia E78.2    Hypercholesteremia E78.00    Tobacco abuse Z72.0    Coronary artery disease due to lipid rich plaque I25.10, I25.83    Chest pain R07.9       Past Medical History:        Diagnosis Date    Hypertension     MI (mitral incompetence)        Past Surgical History:        Procedure Laterality Date    APPENDECTOMY      BACK SURGERY      CORONARY ANGIOPLASTY WITH STENT PLACEMENT      LASIK  11/15/2020    NASAL SEPTUM SURGERY  2014       Social History:    Social History     Tobacco Use    Smoking status: Former     Packs/day: 0.25     Types: Cigarettes     Quit date: 2022     Years since quittin.0    Smokeless tobacco: Never    Tobacco comments:     1 ppd   Substance Use Topics    Alcohol use: No                                Counseling given: Not Answered  Tobacco comments: 1 ppd      Vital Signs (Current):   Vitals:    22 0000 22 0400 22 0524 22 0526   BP:    (!) 148/91   Pulse: 81 72 93 (!) 101   Resp:    18   Temp:    97.6 °F (36.4 °C)   TempSrc:    Temporal   SpO2:       Weight:   211 lb 3.2 oz (95.8 kg)    Height:                                                  BP Readings from Last 3 Encounters:   22 (!) 148/91   22 122/80   22 (!) 156/107       NPO Status:                                                                                 BMI:   Wt Readings from Last 3 Encounters:   22 211 lb 3.2 oz (95.8 kg)   22 222 lb 12.8 oz (101.1 kg)   22 204 lb (92.5 kg)     Body mass index is 27.86 kg/m².     CBC:   Lab Results   Component Value Date/Time    WBC 5.2 2022 08:03 AM    RBC 4.81 2022 08:03 AM    HGB 15.2 2022 08:03 AM    HCT 43.8 07/31/2022 08:03 AM    MCV 91.0 07/31/2022 08:03 AM    RDW 13.3 07/31/2022 08:03 AM     07/31/2022 08:03 AM       CMP:   Lab Results   Component Value Date/Time     07/31/2022 08:04 AM    K 3.9 07/31/2022 08:04 AM    K 3.9 07/30/2022 05:38 AM     07/31/2022 08:04 AM    CO2 27 07/31/2022 08:04 AM    BUN 15 07/31/2022 08:04 AM    CREATININE 1.0 07/31/2022 08:04 AM    GFRAA >60 07/31/2022 08:04 AM    GFRAA >60 06/23/2012 05:25 AM    AGRATIO 1.6 07/29/2022 02:35 PM    LABGLOM >60 07/31/2022 08:04 AM    GLUCOSE 104 07/31/2022 08:04 AM    PROT 6.8 07/29/2022 02:35 PM    CALCIUM 9.5 07/31/2022 08:04 AM    BILITOT 0.6 07/29/2022 02:35 PM    ALKPHOS 104 07/29/2022 02:35 PM    AST 22 07/29/2022 02:35 PM    ALT 38 07/29/2022 02:35 PM       POC Tests: No results for input(s): POCGLU, POCNA, POCK, POCCL, POCBUN, POCHEMO, POCHCT in the last 72 hours.     Coags:   Lab Results   Component Value Date/Time    PROTIME 14.7 07/29/2022 02:35 PM    INR 1.16 07/29/2022 02:35 PM    APTT 56.2 07/29/2022 02:35 PM       HCG (If Applicable): No results found for: PREGTESTUR, PREGSERUM, HCG, HCGQUANT     ABGs:   Lab Results   Component Value Date/Time    PHART 7.381 07/29/2022 02:35 PM    PO2ART 88.9 07/29/2022 02:35 PM    UXK4GXR 44.0 07/29/2022 02:35 PM    GRI9JWN 26.1 07/29/2022 02:35 PM    BEART 0.6 07/29/2022 02:35 PM    P0ZVBWMG 97.1 07/29/2022 02:35 PM        Type & Screen (If Applicable):  No results found for: LABABO, LABRH    Drug/Infectious Status (If Applicable):  No results found for: HIV, HEPCAB    COVID-19 Screening (If Applicable):   Lab Results   Component Value Date/Time    COVID19 Not Detected 08/01/2022 05:20 AM           Anesthesia Evaluation  Patient summary reviewed and Nursing notes reviewed  Airway: Mallampati: II  TM distance: >3 FB   Neck ROM: full  Mouth opening: > = 3 FB   Dental:          Pulmonary:                              Cardiovascular:  Exercise tolerance: good (>4 METS),   (+) hypertension: moderate, angina:, CAD: obstructive,                   Neuro/Psych:               GI/Hepatic/Renal:             Endo/Other:                     Abdominal:             Vascular: Other Findings:           Anesthesia Plan      general     ASA 4     (Smokes weed)  Induction: intravenous and rapid sequence. arterial line, central line, PA catheter and TYLER  MIPS: Postoperative opioids intended, Prophylactic antiemetics administered and Postoperative ventilation. Anesthetic plan and risks discussed with patient.               Post-op pain plan if not by surgeon: single peripheral nerve block            Sp Lozano MD   8/1/2022

## 2022-08-02 ENCOUNTER — APPOINTMENT (OUTPATIENT)
Dept: GENERAL RADIOLOGY | Age: 52
DRG: 234 | End: 2022-08-02
Payer: COMMERCIAL

## 2022-08-02 LAB
ANION GAP SERPL CALCULATED.3IONS-SCNC: 6 MMOL/L (ref 3–16)
BUN BLDV-MCNC: 10 MG/DL (ref 7–20)
CALCIUM IONIZED: 1.12 MMOL/L (ref 1.12–1.32)
CALCIUM SERPL-MCNC: 8.5 MG/DL (ref 8.3–10.6)
CHLORIDE BLD-SCNC: 101 MMOL/L (ref 99–110)
CO2: 26 MMOL/L (ref 21–32)
CREAT SERPL-MCNC: 1.1 MG/DL (ref 0.9–1.3)
GFR AFRICAN AMERICAN: >60
GFR NON-AFRICAN AMERICAN: >60
GLUCOSE BLD-MCNC: 101 MG/DL (ref 70–99)
GLUCOSE BLD-MCNC: 101 MG/DL (ref 70–99)
GLUCOSE BLD-MCNC: 103 MG/DL (ref 70–99)
GLUCOSE BLD-MCNC: 104 MG/DL (ref 70–99)
GLUCOSE BLD-MCNC: 106 MG/DL (ref 70–99)
GLUCOSE BLD-MCNC: 108 MG/DL (ref 70–99)
GLUCOSE BLD-MCNC: 109 MG/DL (ref 70–99)
GLUCOSE BLD-MCNC: 112 MG/DL (ref 70–99)
GLUCOSE BLD-MCNC: 113 MG/DL (ref 70–99)
GLUCOSE BLD-MCNC: 113 MG/DL (ref 70–99)
GLUCOSE BLD-MCNC: 116 MG/DL (ref 70–99)
GLUCOSE BLD-MCNC: 117 MG/DL (ref 70–99)
GLUCOSE BLD-MCNC: 119 MG/DL (ref 70–99)
GLUCOSE BLD-MCNC: 120 MG/DL (ref 70–99)
GLUCOSE BLD-MCNC: 121 MG/DL (ref 70–99)
GLUCOSE BLD-MCNC: 123 MG/DL (ref 70–99)
GLUCOSE BLD-MCNC: 124 MG/DL (ref 70–99)
GLUCOSE BLD-MCNC: 128 MG/DL (ref 70–99)
GLUCOSE BLD-MCNC: 137 MG/DL (ref 70–99)
GLUCOSE BLD-MCNC: 91 MG/DL (ref 70–99)
GLUCOSE BLD-MCNC: 96 MG/DL (ref 70–99)
GLUCOSE BLD-MCNC: 99 MG/DL (ref 70–99)
GLUCOSE BLD-MCNC: 99 MG/DL (ref 70–99)
HCT VFR BLD CALC: 31.6 % (ref 40.5–52.5)
HEMOGLOBIN: 10.6 GM/DL (ref 13.5–17.5)
HEMOGLOBIN: 10.8 G/DL (ref 13.5–17.5)
INR BLD: 1.24 (ref 0.87–1.14)
MAGNESIUM: 1.8 MG/DL (ref 1.8–2.4)
MCH RBC QN AUTO: 31.4 PG (ref 26–34)
MCHC RBC AUTO-ENTMCNC: 34.4 G/DL (ref 31–36)
MCV RBC AUTO: 91.4 FL (ref 80–100)
PDW BLD-RTO: 13.5 % (ref 12.4–15.4)
PERFORMED ON: ABNORMAL
PERFORMED ON: NORMAL
PLATELET # BLD: 141 K/UL (ref 135–450)
PMV BLD AUTO: 8.2 FL (ref 5–10.5)
POC HEMATOCRIT: 31 % (ref 40.5–52.5)
POC POTASSIUM: 4.1 MMOL/L (ref 3.5–5.1)
POC SAMPLE TYPE: ABNORMAL
POC SODIUM: 141 MMOL/L (ref 136–145)
POTASSIUM SERPL-SCNC: 4.4 MMOL/L (ref 3.5–5.1)
PROTHROMBIN TIME: 15.5 SEC (ref 11.7–14.5)
RBC # BLD: 3.45 M/UL (ref 4.2–5.9)
SODIUM BLD-SCNC: 133 MMOL/L (ref 136–145)
WBC # BLD: 8.6 K/UL (ref 4–11)

## 2022-08-02 PROCEDURE — 2000000000 HC ICU R&B

## 2022-08-02 PROCEDURE — 6360000002 HC RX W HCPCS: Performed by: THORACIC SURGERY (CARDIOTHORACIC VASCULAR SURGERY)

## 2022-08-02 PROCEDURE — 6360000002 HC RX W HCPCS: Performed by: HOSPITALIST

## 2022-08-02 PROCEDURE — 6370000000 HC RX 637 (ALT 250 FOR IP): Performed by: HOSPITALIST

## 2022-08-02 PROCEDURE — 6370000000 HC RX 637 (ALT 250 FOR IP): Performed by: THORACIC SURGERY (CARDIOTHORACIC VASCULAR SURGERY)

## 2022-08-02 PROCEDURE — 6370000000 HC RX 637 (ALT 250 FOR IP): Performed by: NURSE PRACTITIONER

## 2022-08-02 PROCEDURE — 6360000002 HC RX W HCPCS

## 2022-08-02 PROCEDURE — 2580000003 HC RX 258: Performed by: HOSPITALIST

## 2022-08-02 PROCEDURE — 71045 X-RAY EXAM CHEST 1 VIEW: CPT

## 2022-08-02 PROCEDURE — 94640 AIRWAY INHALATION TREATMENT: CPT

## 2022-08-02 PROCEDURE — 84295 ASSAY OF SERUM SODIUM: CPT

## 2022-08-02 PROCEDURE — 6360000002 HC RX W HCPCS: Performed by: INTERNAL MEDICINE

## 2022-08-02 PROCEDURE — 80048 BASIC METABOLIC PNL TOTAL CA: CPT

## 2022-08-02 PROCEDURE — 94669 MECHANICAL CHEST WALL OSCILL: CPT

## 2022-08-02 PROCEDURE — 2700000000 HC OXYGEN THERAPY PER DAY

## 2022-08-02 PROCEDURE — 82947 ASSAY GLUCOSE BLOOD QUANT: CPT

## 2022-08-02 PROCEDURE — 94761 N-INVAS EAR/PLS OXIMETRY MLT: CPT

## 2022-08-02 PROCEDURE — 94010 BREATHING CAPACITY TEST: CPT

## 2022-08-02 PROCEDURE — 2500000003 HC RX 250 WO HCPCS: Performed by: THORACIC SURGERY (CARDIOTHORACIC VASCULAR SURGERY)

## 2022-08-02 PROCEDURE — 2100000000 HC CCU R&B

## 2022-08-02 PROCEDURE — 85014 HEMATOCRIT: CPT

## 2022-08-02 PROCEDURE — 83735 ASSAY OF MAGNESIUM: CPT

## 2022-08-02 PROCEDURE — 85027 COMPLETE CBC AUTOMATED: CPT

## 2022-08-02 PROCEDURE — 82330 ASSAY OF CALCIUM: CPT

## 2022-08-02 PROCEDURE — 84132 ASSAY OF SERUM POTASSIUM: CPT

## 2022-08-02 PROCEDURE — 2580000003 HC RX 258: Performed by: THORACIC SURGERY (CARDIOTHORACIC VASCULAR SURGERY)

## 2022-08-02 PROCEDURE — 85610 PROTHROMBIN TIME: CPT

## 2022-08-02 RX ORDER — GABAPENTIN 100 MG/1
200 CAPSULE ORAL 2 TIMES DAILY
Status: DISCONTINUED | OUTPATIENT
Start: 2022-08-02 | End: 2022-08-05 | Stop reason: HOSPADM

## 2022-08-02 RX ORDER — ALBUTEROL SULFATE 2.5 MG/3ML
2.5 SOLUTION RESPIRATORY (INHALATION) EVERY 4 HOURS PRN
Status: DISCONTINUED | OUTPATIENT
Start: 2022-08-02 | End: 2022-08-05 | Stop reason: HOSPADM

## 2022-08-02 RX ORDER — KETOROLAC TROMETHAMINE 30 MG/ML
INJECTION, SOLUTION INTRAMUSCULAR; INTRAVENOUS
Status: COMPLETED
Start: 2022-08-02 | End: 2022-08-02

## 2022-08-02 RX ORDER — ALBUTEROL SULFATE 90 UG/1
2 AEROSOL, METERED RESPIRATORY (INHALATION) 4 TIMES DAILY
Status: DISCONTINUED | OUTPATIENT
Start: 2022-08-02 | End: 2022-08-05

## 2022-08-02 RX ORDER — METHOCARBAMOL 500 MG/1
500 TABLET, FILM COATED ORAL 4 TIMES DAILY
Status: DISCONTINUED | OUTPATIENT
Start: 2022-08-02 | End: 2022-08-05 | Stop reason: HOSPADM

## 2022-08-02 RX ORDER — KETOROLAC TROMETHAMINE 15 MG/ML
15 INJECTION, SOLUTION INTRAMUSCULAR; INTRAVENOUS ONCE
Status: DISCONTINUED | OUTPATIENT
Start: 2022-08-02 | End: 2022-08-03

## 2022-08-02 RX ADMIN — METHOCARBAMOL 500 MG: 500 TABLET ORAL at 20:46

## 2022-08-02 RX ADMIN — MUPIROCIN: 20 OINTMENT TOPICAL at 12:34

## 2022-08-02 RX ADMIN — GABAPENTIN 200 MG: 100 CAPSULE ORAL at 20:46

## 2022-08-02 RX ADMIN — KETOROLAC TROMETHAMINE 15 MG: 30 INJECTION, SOLUTION INTRAMUSCULAR at 09:36

## 2022-08-02 RX ADMIN — CEFAZOLIN 2000 MG: 2 INJECTION, POWDER, FOR SOLUTION INTRAMUSCULAR; INTRAVENOUS at 18:42

## 2022-08-02 RX ADMIN — METOPROLOL TARTRATE 12.5 MG: 25 TABLET, FILM COATED ORAL at 08:57

## 2022-08-02 RX ADMIN — ACETAMINOPHEN 1000 MG: 500 TABLET ORAL at 12:24

## 2022-08-02 RX ADMIN — VANCOMYCIN HYDROCHLORIDE 1500 MG: 10 INJECTION, POWDER, LYOPHILIZED, FOR SOLUTION INTRAVENOUS at 08:38

## 2022-08-02 RX ADMIN — PANTOPRAZOLE SODIUM 40 MG: 40 TABLET, DELAYED RELEASE ORAL at 08:58

## 2022-08-02 RX ADMIN — SENNOSIDES AND DOCUSATE SODIUM 1 TABLET: 50; 8.6 TABLET ORAL at 08:58

## 2022-08-02 RX ADMIN — OXYCODONE 10 MG: 5 TABLET ORAL at 07:58

## 2022-08-02 RX ADMIN — MELATONIN TAB 3 MG 3 MG: 3 TAB at 22:38

## 2022-08-02 RX ADMIN — ACETAMINOPHEN 1000 MG: 500 TABLET ORAL at 07:07

## 2022-08-02 RX ADMIN — POTASSIUM CHLORIDE 10 MEQ: 750 TABLET, FILM COATED, EXTENDED RELEASE ORAL at 17:40

## 2022-08-02 RX ADMIN — SENNOSIDES AND DOCUSATE SODIUM 1 TABLET: 50; 8.6 TABLET ORAL at 20:45

## 2022-08-02 RX ADMIN — Medication 10 ML: at 12:33

## 2022-08-02 RX ADMIN — METOPROLOL TARTRATE 12.5 MG: 25 TABLET, FILM COATED ORAL at 20:46

## 2022-08-02 RX ADMIN — Medication 10 ML: at 12:34

## 2022-08-02 RX ADMIN — FONDAPARINUX SODIUM 2.5 MG: 2.5 INJECTION, SOLUTION SUBCUTANEOUS at 08:58

## 2022-08-02 RX ADMIN — FENTANYL CITRATE 25 MCG: 50 INJECTION, SOLUTION INTRAMUSCULAR; INTRAVENOUS at 00:38

## 2022-08-02 RX ADMIN — ACETAMINOPHEN 1000 MG: 500 TABLET ORAL at 18:42

## 2022-08-02 RX ADMIN — METHOCARBAMOL 500 MG: 500 TABLET ORAL at 17:40

## 2022-08-02 RX ADMIN — METHOCARBAMOL 500 MG: 500 TABLET ORAL at 12:24

## 2022-08-02 RX ADMIN — CALCIUM CHLORIDE 1000 MG: 100 INJECTION, SOLUTION INTRAVENOUS at 00:02

## 2022-08-02 RX ADMIN — MUPIROCIN: 20 OINTMENT TOPICAL at 20:48

## 2022-08-02 RX ADMIN — POTASSIUM CHLORIDE 10 MEQ: 750 TABLET, FILM COATED, EXTENDED RELEASE ORAL at 12:25

## 2022-08-02 RX ADMIN — Medication 10 ML: at 20:49

## 2022-08-02 RX ADMIN — Medication 2 PUFF: at 20:33

## 2022-08-02 RX ADMIN — METHOCARBAMOL 500 MG: 500 TABLET ORAL at 09:17

## 2022-08-02 RX ADMIN — DIAZEPAM 5 MG: 5 TABLET ORAL at 03:03

## 2022-08-02 RX ADMIN — CEFAZOLIN 2000 MG: 2 INJECTION, POWDER, FOR SOLUTION INTRAMUSCULAR; INTRAVENOUS at 12:29

## 2022-08-02 RX ADMIN — Medication 400 MG: at 08:58

## 2022-08-02 RX ADMIN — DIPHENHYDRAMINE HYDROCHLORIDE 25 MG: 25 TABLET ORAL at 00:38

## 2022-08-02 RX ADMIN — DIPHENHYDRAMINE HYDROCHLORIDE 25 MG: 25 TABLET ORAL at 22:38

## 2022-08-02 RX ADMIN — Medication 10 ML: at 20:48

## 2022-08-02 RX ADMIN — Medication 2 PUFF: at 16:12

## 2022-08-02 RX ADMIN — VANCOMYCIN HYDROCHLORIDE 1500 MG: 10 INJECTION, POWDER, LYOPHILIZED, FOR SOLUTION INTRAVENOUS at 22:03

## 2022-08-02 RX ADMIN — CEFAZOLIN 2000 MG: 2 INJECTION, POWDER, FOR SOLUTION INTRAMUSCULAR; INTRAVENOUS at 03:09

## 2022-08-02 RX ADMIN — FENTANYL CITRATE 25 MCG: 50 INJECTION, SOLUTION INTRAMUSCULAR; INTRAVENOUS at 04:50

## 2022-08-02 RX ADMIN — CLONAZEPAM 0.5 MG: 1 TABLET ORAL at 17:44

## 2022-08-02 RX ADMIN — OXYCODONE 10 MG: 5 TABLET ORAL at 04:08

## 2022-08-02 RX ADMIN — CLONAZEPAM 0.5 MG: 1 TABLET ORAL at 22:38

## 2022-08-02 RX ADMIN — OXYCODONE 5 MG: 5 TABLET ORAL at 12:26

## 2022-08-02 RX ADMIN — FUROSEMIDE 20 MG: 10 INJECTION, SOLUTION INTRAMUSCULAR; INTRAVENOUS at 08:58

## 2022-08-02 RX ADMIN — Medication 400 MG: at 20:46

## 2022-08-02 RX ADMIN — ACETAMINOPHEN 1000 MG: 500 TABLET ORAL at 00:10

## 2022-08-02 RX ADMIN — GABAPENTIN 200 MG: 100 CAPSULE ORAL at 08:58

## 2022-08-02 RX ADMIN — ALBUTEROL SULFATE 2.5 MG: 2.5 SOLUTION RESPIRATORY (INHALATION) at 08:18

## 2022-08-02 RX ADMIN — Medication 2 PUFF: at 12:06

## 2022-08-02 RX ADMIN — FUROSEMIDE 20 MG: 10 INJECTION, SOLUTION INTRAMUSCULAR; INTRAVENOUS at 17:40

## 2022-08-02 RX ADMIN — POTASSIUM CHLORIDE 10 MEQ: 750 TABLET, FILM COATED, EXTENDED RELEASE ORAL at 09:16

## 2022-08-02 RX ADMIN — MORPHINE SULFATE 2 MG: 2 INJECTION, SOLUTION INTRAMUSCULAR; INTRAVENOUS at 03:03

## 2022-08-02 RX ADMIN — OXYCODONE 10 MG: 5 TABLET ORAL at 00:09

## 2022-08-02 RX ADMIN — ASPIRIN 325 MG: 325 TABLET, COATED ORAL at 08:59

## 2022-08-02 RX ADMIN — OXYCODONE 10 MG: 5 TABLET ORAL at 20:45

## 2022-08-02 ASSESSMENT — PAIN DESCRIPTION - DESCRIPTORS: DESCRIPTORS: ACHING

## 2022-08-02 ASSESSMENT — PAIN SCALES - GENERAL
PAINLEVEL_OUTOF10: 4
PAINLEVEL_OUTOF10: 5
PAINLEVEL_OUTOF10: 8
PAINLEVEL_OUTOF10: 5
PAINLEVEL_OUTOF10: 6
PAINLEVEL_OUTOF10: 8
PAINLEVEL_OUTOF10: 8
PAINLEVEL_OUTOF10: 4
PAINLEVEL_OUTOF10: 8
PAINLEVEL_OUTOF10: 9
PAINLEVEL_OUTOF10: 8

## 2022-08-02 ASSESSMENT — PAIN DESCRIPTION - LOCATION
LOCATION: CHEST;INCISION

## 2022-08-02 NOTE — PROGRESS NOTES
Patient walked around unit with walker, and tolerated fairly well. Patient is unable to take deep breathes with chest tubes in place, upon entering the room and getting the patient settled, his oxygen was in the mid-80's. Patient was tachypneic and required a non-rebreather to recover. Chest xray ordered, and additional meds for pain. Will monitor.     Herrera DOANN, RN, CCRN

## 2022-08-02 NOTE — PROGRESS NOTES
Patient out of bed to chair X 2 assists. Patient with steady gait and tolerated well. Hemodynamics remain stable. Call light within reach.

## 2022-08-02 NOTE — PROGRESS NOTES
Patient met criteria per open heart protocol to transition to stepdown level of care. Procedures explained to patient. RIJ Hortense Lucero discontinued and obturator inserted. Patient tolerated well and minimal ectopy on monitor. Patient tolerated well.

## 2022-08-02 NOTE — PROGRESS NOTES
Order received for arterial line removal.  Right arterial line discontinued. Manual pressure held for 20 minutes and tegaderm on site. No hematoma, no oozing noted. Patient tolerated well.        Latrice Ac BSN, RN, CCRN

## 2022-08-02 NOTE — PROGRESS NOTES
Criteria met per clinical pathway to remove epicardial pacing wires & MD order received. Procedure explained to patient. INR is less than 2.0 (if on coumadin). Pacing wire site within normal limits. Cleansed site with Chloroprep. Ventricular pacing wires removed per policy without difficulty. Dry sterile dressing applied. Vital signs will be monitored and recorded per open heart protocol (every 15 minutes X 2, every 30 minutes X 1, and every hour X 1). Patient tolerated procedure well, heart tones easily auscultated, oxygen saturation within normal limits. Signs/symtoms for cardiac tamponade will be monitored closely.         Latrice Ac BSN, RN, CCRN

## 2022-08-02 NOTE — PROGRESS NOTES
Chest tubes meet criteria to remove per open heart protocol. If chest tubes do not meet criteria, a specific order has been entered to remove by Dr. Kiera Gardner. No air leak and NO crepitus noted. Pt instructed on procedure. Dressings removed. Incision within normal limits. Site cleansed and prepped per protocol. Chest tubes X 2 removed without difficulty. Vaseline gauze and dry sterile dressing applied. Bilateral breath sounds audible. O2Sats 94% on 6L NC  Patient tolerated well.     Chest Xray scheduled for George Farooq. 97. BSN, RN, CCRN

## 2022-08-02 NOTE — PROGRESS NOTES
Nutrition Note    RECOMMENDATIONS  PO Diet: Added fluid restriction to current diet  ONS: Please order ONS if po intake is consistently <50%. NUTRITION ASSESSMENT   Pt is s/p urgent CABGx3 8/1. Currently on 2 gm Na cardiac restricted diet with documented intakes of % prior to surgery. Upon visiting this morning, reported poor appetite currently, did not eat anything for breakfast. Reported usually only eats 1 meal a day (dinner) at home d/t work/busy. Wt tends to fluctuates 5-7 lb d/t fluids, UBW of 201 lb, admission wt of 213 lb. Per wt hx in EMR, wt appears stable. RD will continue to monitor for adequate po intake and provide cardiac nutrition prior to discharge. Nutrition Related Findings: Receiving NS at 50 mL/hr, insulin gtt. +4.8L. Na 133. HbA1c of 5.5% on 7/29. LBM 7/31, BS hypoactive. Non-pitting generalized edema. Wounds: Multiple  Nutrition Education:  Education needed (caregiver to attend cardiac class prior to discharge)   Nutrition Goals: PO intake 50% or greater, by next RD assessment     MALNUTRITION ASSESSMENT   Acute Illness  Malnutrition Status: No malnutrition    NUTRITION DIAGNOSIS   Increased nutrient needs related to increase demand for energy/nutrients as evidenced by other (comment) (s/p OHS)    CURRENT NUTRITION THERAPIES  ADULT DIET; Regular; Low Fat/Low Chol/High Fiber/2 gm Na     PO Intake: %   PO Supplement Intake:None Ordered    ANTHROPOMETRICS  Current Height: 6' 1\" (185.4 cm)  Current Weight: 223 lb 5.2 oz (101.3 kg)    Admission weight: 213 lb (96.6 kg) (bed wt)  Ideal Body Weight (IBW): 184 lbs  (84 kg)    Usual Bodyweight 201 lb (91.2 kg)       BMI: 29.4    COMPARATIVE STANDARDS  Energy (kcal):  8552-2363     Protein (g):  109-167       Fluid (mL/day):  2020    The patient will be monitored per nutrition standards of care. Consult dietitian if additional nutrition interventions are needed prior to RD reassessment.      Claudell Mcgill, MS, RD, LD    Contact: [phone]

## 2022-08-02 NOTE — PROGRESS NOTES
Another episode after walking where patient desaturated to the mid 80's. Patient explained that breathing was getting hard and pain was unbearable. Patient is not due for pain medication at this time. Currently on a non-rebreather of 15L. Patient is resting in chair.

## 2022-08-03 ENCOUNTER — PREP FOR PROCEDURE (OUTPATIENT)
Dept: CARDIOTHORACIC SURGERY | Age: 52
End: 2022-08-03

## 2022-08-03 LAB
ANION GAP SERPL CALCULATED.3IONS-SCNC: 8 MMOL/L (ref 3–16)
BUN BLDV-MCNC: 13 MG/DL (ref 7–20)
CALCIUM SERPL-MCNC: 8.3 MG/DL (ref 8.3–10.6)
CHLORIDE BLD-SCNC: 96 MMOL/L (ref 99–110)
CO2: 29 MMOL/L (ref 21–32)
CREAT SERPL-MCNC: 1 MG/DL (ref 0.9–1.3)
EKG ATRIAL RATE: 79 BPM
EKG DIAGNOSIS: NORMAL
EKG P AXIS: 33 DEGREES
EKG P-R INTERVAL: 196 MS
EKG Q-T INTERVAL: 376 MS
EKG QRS DURATION: 82 MS
EKG QTC CALCULATION (BAZETT): 431 MS
EKG R AXIS: 33 DEGREES
EKG T AXIS: 32 DEGREES
EKG VENTRICULAR RATE: 79 BPM
GFR AFRICAN AMERICAN: >60
GFR NON-AFRICAN AMERICAN: >60
GLUCOSE BLD-MCNC: 106 MG/DL (ref 70–99)
GLUCOSE BLD-MCNC: 109 MG/DL (ref 70–99)
GLUCOSE BLD-MCNC: 119 MG/DL (ref 70–99)
GLUCOSE BLD-MCNC: 121 MG/DL (ref 70–99)
GLUCOSE BLD-MCNC: 146 MG/DL (ref 70–99)
GLUCOSE BLD-MCNC: 89 MG/DL (ref 70–99)
HCT VFR BLD CALC: 30.4 % (ref 40.5–52.5)
HEMOGLOBIN: 10.4 G/DL (ref 13.5–17.5)
INR BLD: 1.33 (ref 0.87–1.14)
MAGNESIUM: 1.7 MG/DL (ref 1.8–2.4)
MCH RBC QN AUTO: 31.6 PG (ref 26–34)
MCHC RBC AUTO-ENTMCNC: 34.2 G/DL (ref 31–36)
MCV RBC AUTO: 92.2 FL (ref 80–100)
PDW BLD-RTO: 13.9 % (ref 12.4–15.4)
PERFORMED ON: ABNORMAL
PERFORMED ON: NORMAL
PLATELET # BLD: 122 K/UL (ref 135–450)
PMV BLD AUTO: 8.5 FL (ref 5–10.5)
POTASSIUM SERPL-SCNC: 4 MMOL/L (ref 3.5–5.1)
PROTHROMBIN TIME: 16.4 SEC (ref 11.7–14.5)
RBC # BLD: 3.3 M/UL (ref 4.2–5.9)
SODIUM BLD-SCNC: 133 MMOL/L (ref 136–145)
WBC # BLD: 11.7 K/UL (ref 4–11)

## 2022-08-03 PROCEDURE — 80048 BASIC METABOLIC PNL TOTAL CA: CPT

## 2022-08-03 PROCEDURE — 6360000002 HC RX W HCPCS: Performed by: HOSPITALIST

## 2022-08-03 PROCEDURE — 6360000002 HC RX W HCPCS: Performed by: THORACIC SURGERY (CARDIOTHORACIC VASCULAR SURGERY)

## 2022-08-03 PROCEDURE — 97166 OT EVAL MOD COMPLEX 45 MIN: CPT

## 2022-08-03 PROCEDURE — 97530 THERAPEUTIC ACTIVITIES: CPT

## 2022-08-03 PROCEDURE — 2000000000 HC ICU R&B

## 2022-08-03 PROCEDURE — 94640 AIRWAY INHALATION TREATMENT: CPT

## 2022-08-03 PROCEDURE — 83735 ASSAY OF MAGNESIUM: CPT

## 2022-08-03 PROCEDURE — 85610 PROTHROMBIN TIME: CPT

## 2022-08-03 PROCEDURE — 2580000003 HC RX 258: Performed by: THORACIC SURGERY (CARDIOTHORACIC VASCULAR SURGERY)

## 2022-08-03 PROCEDURE — 2580000003 HC RX 258: Performed by: HOSPITALIST

## 2022-08-03 PROCEDURE — 6370000000 HC RX 637 (ALT 250 FOR IP): Performed by: HOSPITALIST

## 2022-08-03 PROCEDURE — 97161 PT EVAL LOW COMPLEX 20 MIN: CPT

## 2022-08-03 PROCEDURE — 36592 COLLECT BLOOD FROM PICC: CPT

## 2022-08-03 PROCEDURE — 94669 MECHANICAL CHEST WALL OSCILL: CPT

## 2022-08-03 PROCEDURE — 6370000000 HC RX 637 (ALT 250 FOR IP): Performed by: NURSE PRACTITIONER

## 2022-08-03 PROCEDURE — 93010 ELECTROCARDIOGRAM REPORT: CPT | Performed by: INTERNAL MEDICINE

## 2022-08-03 PROCEDURE — 94150 VITAL CAPACITY TEST: CPT

## 2022-08-03 PROCEDURE — 94761 N-INVAS EAR/PLS OXIMETRY MLT: CPT

## 2022-08-03 PROCEDURE — 2700000000 HC OXYGEN THERAPY PER DAY

## 2022-08-03 PROCEDURE — 97116 GAIT TRAINING THERAPY: CPT

## 2022-08-03 PROCEDURE — 85027 COMPLETE CBC AUTOMATED: CPT

## 2022-08-03 PROCEDURE — 6370000000 HC RX 637 (ALT 250 FOR IP): Performed by: THORACIC SURGERY (CARDIOTHORACIC VASCULAR SURGERY)

## 2022-08-03 RX ORDER — CALCIUM CARBONATE 200(500)MG
500 TABLET,CHEWABLE ORAL 3 TIMES DAILY PRN
Status: DISCONTINUED | OUTPATIENT
Start: 2022-08-03 | End: 2022-08-05 | Stop reason: HOSPADM

## 2022-08-03 RX ADMIN — MUPIROCIN: 20 OINTMENT TOPICAL at 08:29

## 2022-08-03 RX ADMIN — SENNOSIDES AND DOCUSATE SODIUM 1 TABLET: 50; 8.6 TABLET ORAL at 08:26

## 2022-08-03 RX ADMIN — Medication 2 PUFF: at 09:08

## 2022-08-03 RX ADMIN — POTASSIUM CHLORIDE 10 MEQ: 750 TABLET, FILM COATED, EXTENDED RELEASE ORAL at 08:26

## 2022-08-03 RX ADMIN — FUROSEMIDE 20 MG: 10 INJECTION, SOLUTION INTRAMUSCULAR; INTRAVENOUS at 17:52

## 2022-08-03 RX ADMIN — PANTOPRAZOLE SODIUM 40 MG: 40 TABLET, DELAYED RELEASE ORAL at 08:26

## 2022-08-03 RX ADMIN — OXYCODONE 10 MG: 5 TABLET ORAL at 05:10

## 2022-08-03 RX ADMIN — FENTANYL CITRATE 25 MCG: 50 INJECTION, SOLUTION INTRAMUSCULAR; INTRAVENOUS at 00:04

## 2022-08-03 RX ADMIN — POTASSIUM CHLORIDE 20 MEQ: 29.8 INJECTION, SOLUTION INTRAVENOUS at 05:20

## 2022-08-03 RX ADMIN — FONDAPARINUX SODIUM 2.5 MG: 2.5 INJECTION, SOLUTION SUBCUTANEOUS at 08:26

## 2022-08-03 RX ADMIN — Medication 2 PUFF: at 23:31

## 2022-08-03 RX ADMIN — ACETAMINOPHEN 1000 MG: 500 TABLET ORAL at 17:52

## 2022-08-03 RX ADMIN — CEFAZOLIN 2000 MG: 2 INJECTION, POWDER, FOR SOLUTION INTRAMUSCULAR; INTRAVENOUS at 02:05

## 2022-08-03 RX ADMIN — OXYCODONE 10 MG: 5 TABLET ORAL at 14:52

## 2022-08-03 RX ADMIN — CLONAZEPAM 0.5 MG: 1 TABLET ORAL at 08:25

## 2022-08-03 RX ADMIN — Medication 2 PUFF: at 16:27

## 2022-08-03 RX ADMIN — OXYCODONE 10 MG: 5 TABLET ORAL at 10:36

## 2022-08-03 RX ADMIN — FUROSEMIDE 20 MG: 10 INJECTION, SOLUTION INTRAMUSCULAR; INTRAVENOUS at 08:26

## 2022-08-03 RX ADMIN — Medication 10 ML: at 20:09

## 2022-08-03 RX ADMIN — Medication 10 ML: at 08:28

## 2022-08-03 RX ADMIN — OXYCODONE 10 MG: 5 TABLET ORAL at 21:54

## 2022-08-03 RX ADMIN — METHOCARBAMOL 500 MG: 500 TABLET ORAL at 12:50

## 2022-08-03 RX ADMIN — ACETAMINOPHEN 1000 MG: 500 TABLET ORAL at 06:07

## 2022-08-03 RX ADMIN — Medication 400 MG: at 20:06

## 2022-08-03 RX ADMIN — OXYCODONE 10 MG: 5 TABLET ORAL at 01:01

## 2022-08-03 RX ADMIN — ASPIRIN 325 MG: 325 TABLET, COATED ORAL at 08:26

## 2022-08-03 RX ADMIN — METHOCARBAMOL 500 MG: 500 TABLET ORAL at 08:26

## 2022-08-03 RX ADMIN — MORPHINE SULFATE 2 MG: 2 INJECTION, SOLUTION INTRAMUSCULAR; INTRAVENOUS at 04:08

## 2022-08-03 RX ADMIN — GABAPENTIN 200 MG: 100 CAPSULE ORAL at 08:26

## 2022-08-03 RX ADMIN — Medication 10 ML: at 20:08

## 2022-08-03 RX ADMIN — POTASSIUM CHLORIDE 10 MEQ: 750 TABLET, FILM COATED, EXTENDED RELEASE ORAL at 12:50

## 2022-08-03 RX ADMIN — MAGNESIUM SULFATE HEPTAHYDRATE 2000 MG: 40 INJECTION, SOLUTION INTRAVENOUS at 05:13

## 2022-08-03 RX ADMIN — Medication 400 MG: at 08:26

## 2022-08-03 RX ADMIN — Medication 2 PUFF: at 12:10

## 2022-08-03 RX ADMIN — MUPIROCIN: 20 OINTMENT TOPICAL at 20:08

## 2022-08-03 RX ADMIN — ANTACID TABLETS 500 MG: 500 TABLET, CHEWABLE ORAL at 04:47

## 2022-08-03 RX ADMIN — SENNOSIDES AND DOCUSATE SODIUM 1 TABLET: 50; 8.6 TABLET ORAL at 20:06

## 2022-08-03 RX ADMIN — ANTACID TABLETS 500 MG: 500 TABLET, CHEWABLE ORAL at 08:38

## 2022-08-03 RX ADMIN — ACETAMINOPHEN 1000 MG: 500 TABLET ORAL at 12:50

## 2022-08-03 RX ADMIN — METOPROLOL TARTRATE 12.5 MG: 25 TABLET, FILM COATED ORAL at 08:26

## 2022-08-03 RX ADMIN — POTASSIUM CHLORIDE 10 MEQ: 750 TABLET, FILM COATED, EXTENDED RELEASE ORAL at 17:52

## 2022-08-03 RX ADMIN — METHOCARBAMOL 500 MG: 500 TABLET ORAL at 20:06

## 2022-08-03 RX ADMIN — METHOCARBAMOL 500 MG: 500 TABLET ORAL at 17:52

## 2022-08-03 ASSESSMENT — PAIN SCALES - GENERAL
PAINLEVEL_OUTOF10: 8
PAINLEVEL_OUTOF10: 5
PAINLEVEL_OUTOF10: 8
PAINLEVEL_OUTOF10: 4
PAINLEVEL_OUTOF10: 7
PAINLEVEL_OUTOF10: 0
PAINLEVEL_OUTOF10: 0
PAINLEVEL_OUTOF10: 6
PAINLEVEL_OUTOF10: 5
PAINLEVEL_OUTOF10: 8

## 2022-08-03 ASSESSMENT — PAIN DESCRIPTION - LOCATION
LOCATION: CHEST;INCISION
LOCATION: STERNUM
LOCATION: INCISION;CHEST

## 2022-08-03 ASSESSMENT — PAIN DESCRIPTION - DESCRIPTORS: DESCRIPTORS: ACHING

## 2022-08-03 ASSESSMENT — PAIN DESCRIPTION - PAIN TYPE: TYPE: SURGICAL PAIN

## 2022-08-03 ASSESSMENT — PAIN DESCRIPTION - FREQUENCY: FREQUENCY: INTERMITTENT

## 2022-08-03 ASSESSMENT — PAIN DESCRIPTION - ORIENTATION: ORIENTATION: ANTERIOR;POSTERIOR

## 2022-08-03 ASSESSMENT — PAIN DESCRIPTION - ONSET: ONSET: PROGRESSIVE

## 2022-08-03 NOTE — PROGRESS NOTES
Shift assessment complete. See flow sheet. Ambulated x2 laps around the unit. Voided per urinal.  C/o acid reflux, PRN tums given. Also c/o anxiety, medicated per eMAR. Up to chair post walk. Discussed POC for this shift. Patient encouraged to use call light with any needs. Patient states understanding, call light in reach, will continue with current plan of care.

## 2022-08-03 NOTE — PROCEDURES
[facility]                     [facility], [address]                               PULMONARY FUNCTION    PATIENT NAME: Chitra Mitchell                    :        1970  MED REC NO:   [id number]                          ROOM:       2906  ACCOUNT NO:   [de-identified]                           ADMIT DATE: 2022  PROVIDER:     Patti Gross MD    DATE OF PROCEDURE:  2022    Spirometry reveals normal FVC and FEV1.  FEV1/FVC ratio is normal.   Expiratory flow rates are normal.    IMPRESSION:  Normal spirometry.         Gaby Olson MD    D: 2022 8:40:02       T: 2022 12:42:42     [id number]  Job#: [id number]     Doc#: [id number]    CC:

## 2022-08-03 NOTE — PROGRESS NOTES
short mobility form. Current research shows that an AM-PAC score of 18 or greater is typically associated with a discharge to the patient's home setting. Based on the patient's AM-PAC score and their current functional mobility deficits, it is recommended that the patient have 2-3 sessions per week of Physical Therapy at d/c to increase the patient's independence. At this time, this patient demonstrates the endurance and safety to discharge home with home services and a follow up treatment frequency of 2-3x/wk. Please see assessment section for further patient specific details. If patient discharges prior to next session this note will serve as a discharge summary. Please see below for the latest assessment towards goals. DME Required For Discharge: rollator (4 wheel walker)  Precautions/Restrictions: medium fall risk  Weight Bearing Restrictions: no restrictions  [] Right Upper Extremity  [] Left Upper Extremity [] Right Lower Extremity  [] Left Lower Extremity     Required Braces/Orthotics: no braces required   [] Right  [] Left  Positional Restrictions:Sternal Precautions - No Pushing, Pulling, Lifting > 5 lbs    Pre-Admission Information   Lives With: alone                     Type of Home: house  Home Layout: tri-level  Home Access:  1 step to enter with handrail. Handrails are located on right side. Bathroom Layout: tub/shower unit  Bathroom Equipment: . Comment: Pt does not have any bathroom equipment, discussed tub bench/shower chair for energy conservation/safety at home. Toilet Height: standard height  Home Equipment: no prior equipment  Transfer Assistance: Independent without use of device  Ambulation Assistance:Independent without use of device  ADL Assistance: independent with all ADL's  IADL Assistance: independent with homemaking tasks  Active :        [x] Yes                 [] No  Hand Dominance: [] Left                 [x] Right  Current Employment: full time employment. Occupation:   Hobbies:   Recent Falls: no falls     Examination   Vision:   Vision Gross Assessment: Impaired and Vision Corrective Device: wears glasses for reading  Hearing:   WFL  Posture:   Fair  Sensation:   WFL  ROM:   (B) LE AROM WFL  Strength:   (B) LE strength grossly WFL  Decision Making: low complexity  Clinical Presentation: stable      Subjective  General: Pt sitting in chair upon arrival with family present. Pt agreeable to PT/OT eval.  Pain: 0/10  Pain Interventions: not applicable       Functional Mobility  Bed Mobility  Bed mobility not completed on this date. Comments:  Transfers  Sit to stand transfer: contact guard assistance  Stand to sit transfer: contact guard assistance  Comments:  Ambulation  Surface:level surface  Assistive Device: rollator (9VFR)  Assistance: contact guard assistance  Distance: >600 ft  Gait Mechanics: Pt ambulates with decreased step length, slow yelitza, no LOB. Comments:    Stair Mobility  Stair mobility not completed on this date. Comments:  Wheelchair Mobility:  No w/c mobility completed on this date.   Comments:  Balance  Static Sitting Balance: good: independent with functional balance in unsupported position  Dynamic Sitting Balance: good: independent with functional balance in unsupported position  Static Standing Balance: fair (-): maintains balance at CGA with use of UE support  Dynamic Standing Balance: fair (-): maintains balance at CGA with use of UE support  Comments:    Other Therapeutic Interventions    Functional Outcomes  AM-PAC Inpatient Mobility Raw Score : 19              Cognition  WFL  Orientation:    alert and oriented x 4  Command Following:   [facility]    Education  Barriers To Learning: none  Patient Education: patient educated on goals, PT role and benefits, plan of care, precautions, discharge recommendations  Learning Assessment:  patient verbalizes and demonstrates understanding    Assessment  Activity Tolerance: Fair  Impairments Requiring Therapeutic Intervention: decreased functional mobility, decreased strength, decreased endurance, decreased balance  Prognosis: good  Clinical Assessment: Pt presents as below his baseline function and would benefit from skilled PT services to promote safe return to PLOF. Safety Interventions: patient left in chair, chair alarm in place, call light within reach, gait belt, patient at risk for falls, nurse notified, and family/caregiver present    Plan  Frequency: 3-5 x/per week  Current Treatment Recommendations: strengthening, ROM, balance training, functional mobility training, transfer training, gait training, endurance training, patient/caregiver education, and safety education    Goals  Patient Goals:  To return to home and work   Short Term Goals:  Time Frame: Prior to D/C  Patient will complete bed mobility at modified independent   Patient will complete transfers at [facility]   Patient will ambulate 150 ft with use of LRAD at [facility]  Patient will ascend/descend 14 stairs with (R) ascending handrail at supervision    Therapy Session Time      Individual Group Co-treatment   Time In     1407   Time Out     1440   Minutes     33     Timed Code Treatment Minutes:  18  Total Treatment Minutes:  33       Electronically Signed By: ROYCE Caldera PT, DPT, [id number]

## 2022-08-03 NOTE — PROGRESS NOTES
Incentive Spirometry education and demonstration completed by Respiratory Therapy Yes      Response to education: Excellent     Teaching Time: 5 minutes    Minimum Predicted Vital Capacity - 799 mL. Patient's Actual Vital Capacity - 800 mL. Turning over to Nursing for routine follow-up Yes.     Comments: IS goal met IS turned to nursing    Electronically signed by Cresencio Vaz on 8/3/2022 at 9:23 AM

## 2022-08-03 NOTE — PROGRESS NOTES
Criteria met per open heart protocol to discontinue romero catheter. Procedure explained to patient. 10cc deflated from balloon. Romero catheter discontinued without any difficulty. Call light in reach and patient instructed to call RN for assistance.

## 2022-08-03 NOTE — PROGRESS NOTES
Cardiothoracic Surgery Progress Note    CC: [place], Severe 3V CAD, HTN, HLD    S/P: URGENT CABG CORONARY ARTERY BYPASS X 3/DEJAH  EXCISION OF PRESTERNAL SEBACEOUS CYST    POD# 2    Aggressive Pulmonary toilet     Subjective:  Hemodynamically stable, 5L NC, O2 Sats 92%, afebrile. Alert and oriented X 3. Weight  102.7 kg this am.     WT:101.3  kg   pre-op 95.8 kg    Vital Signs:   /79   Pulse (!) 101   Temp 98.7 °F (37.1 °C) (Temporal)   Resp 20   Ht 6' 1\" (1.854 m)   Wt 226 lb 6.6 oz (102.7 kg)   SpO2 92%   BMI 29.87 kg/m²     IJ    Physical Exam:   Cardiac: regular, no murmur, NSR/ST  Lungs: clear to auscultation, decreased bases bilaterally  Abdomen:  no BM, ABD soft non-tender  Vascular:  pulses all palpable   Extremities: generalized, non-pitting edema 1+  :  /935/200   Lasix 20 IV BID  Chest Tube(s) & Incision(s):    Sternum: stable,C/D/I   Edges approximated, no drainage  Chest tube site: C/D/I  Purse string intact    Left Leg incision:   C/D/I  Edges well approximated, no drainage  Prevena Dressing w/ scant ss drainage      Labs:   CBC:   Recent Labs     08/02/22  0400 08/03/22  0415   WBC 8.6 11.7*   HGB 10.8* 10.4*   HCT 31.6* 30.4*    122*     BMP:   Recent Labs     08/02/22  0400 08/03/22  0415   K 4.4 4.0   CREATININE 1.1 1.0   CALCIUM 8.5 8.3   MG 1.80 1.70*     PT/INR:   Recent Labs     08/02/22  0400 08/03/22  0415   PROTIME 15.5* 16.4*   INR 1.24* 1.33*     CXR: 8/2/22  Redemonstration of bibasilar opacities, which may reflect underlying  atelectasis. Correlate clinically with signs of pneumonia. Suggestion of small left pleural effusion.   No evidence of pneumothorax seen    Assessment/Plan:  As per CC:     Plan:   S/P CABG X 3 DEJAH; EXCISION OF PRESTERNAL SEBACEOUS CYST  Aggressive IS - EZPAP  Diuresis - strict I/O, fluid restriction   Wean oxygen  Laxative of choice   Ambulate  Labs: WBC 11.7/H/H 10/30, K+ 4.0, Creat 1.0, Mg 1.70 replace  Meds: Lopressor 12.5/ASA/ACE 8/5, Pain Meds: Oxy/tony/Robaxin/ one time dose of Toradol on 8/2    Disp:  Plan for home with home care       Electronically signed by AZALIA Geiger CNP on 8/3/2022 at 10:01 AM      Attending Supervising Physicians Attestation Statement  The patient met the criteria for direct supervision. I saw and examined the patient and discussed the findings and plans with the nurse practitioner and agree as documented in his note .

## 2022-08-03 NOTE — PROGRESS NOTES
[facility]   Phone: [phone]    Occupational Therapy    [x] Initial Evaluation            [] Daily Treatment Note         [] Discharge Summary      Patient: Qian Wilder Sr.   : 1970   MRN: [id number]   Date of Service:  8/3/2022    Admitting Diagnosis:  Chest pain  Current Admission Summary: The patient is a 47 y.o. male with significant past medical history of HTN, CAD w/ coronary angioplasty with 4x stents ( and ), and Mitral incompetence, GERD, Peptic Ulcer disease, Gracia's Esophagus, Plaque Psoriasis, neck surgery, ankle surgery, and appendectomy who presented to the Emergency Department on 22 with intermittent left sided chest pain that radiated to his left neck and shoulder. This pain was alleviated with rest.  Endorses shortness of breath with exertion. Patient saw Dr. Naoma Mortimer the day prior to admission in the office and was instructed to go to the emergency department if symptoms continued for evaluation and heart cath. Left heart angiogram found severe multivessel coronary artery disease. Pt s/p CABGx3 on 2022. Patient is currently hemodynamically stable and denying any complaints of chest pain or shortness  Past Medical History:  has a past medical history of Hypertension and MI (mitral incompetence). Past Surgical History:  has a past surgical history that includes Coronary angioplasty with stent; Appendectomy; Nasal septum surgery (2014); LASIK (11/15/2020); back surgery; and Coronary artery bypass graft (N/A, 2022). Discharge Recommendations: Qian Wilder Sr. scored a 22/24 on the AM-PAC ADL Inpatient form. Current research shows that an AM-PAC score of 18 or greater is typically associated with a discharge to the patient's home setting.  Based on the patient's AM-PAC score, and their current ADL deficits, it is recommended that the patient have 1-2 sessions per week of Occupational Therapy at d/c to increase the patient's independence. At this time, this patient demonstrates the endurance and safety to discharge home with family support. see assessment section for further patient specific details. If patient discharges prior to next session this note will serve as a discharge summary. Please see below for the latest assessment towards goals. DME Required For Discharge: DME to be determined pending patient progress Pt may benefit from shower chair/tub bench for energy conservation/safety. Positional Restrictions:Sternal Precautions - No Pushing, Pulling, Lifting > 5 lbs    Pre-Admission Information   Lives With: alone    Type of Home: house  Home Layout: tri-level  Home Access:  1 step to enter with handrail. Handrails are located on right side. Bathroom Layout: tub/shower unit  Bathroom Equipment: . Comment: Pt does not have any bathroom equipment, discussed tub bench/shower chair for energy conservation/safety at home. Toilet Height: standard height  Home Equipment: no prior equipment  Transfer Assistance: Independent without use of device  Ambulation Assistance:Independent without use of device  ADL Assistance: independent with all ADL's  IADL Assistance: independent with homemaking tasks  Active :        [x] Yes  [] No  Hand Dominance: [] Left  [x] Right  Current Employment: full time employment. Occupation:   Hobbies:   Recent Falls: no falls     Examination   Vision:   Vision Gross Assessment: WFL and Vision Corrective Device: wears glasses for reading  Hearing:   WFL      ROM:   (B) UE AROM WFL  Strength:   (B) UE strength grossly WFL    Decision Making: medium complexity  Clinical Presentation: stable      Subjective  General: Pt received seated in recliner chair agreeable to OT / PT evaluation. Pain: 4/10.   Location: sternal incision  Pt able to tolerate   Pain Interventions: RN notified post evaluation to provide pain cognition        Activities of Daily 1-2 x/per week  Current Treatment Recommendations: endurance training, patient/caregiver education, and home management training    Goals  Patient Goals: go home   Short Term Goals:  Time Frame: upon discharge  Patient will complete lower body ADL at Independent   Patient will complete functional transfers at Independent   Patient will complete functional mobility at modified independent   Patient will increase [facility] ADL score = to or > than 24/24    Therapy Session Time     Individual Group Co-treatment   Time In    0207   Time Out     0240   Minutes     33        Timed Code Treatment Minutes:   18  Total Treatment Minutes:  33       Electronically Signed By: Parish Virk, OT

## 2022-08-04 LAB
ANION GAP SERPL CALCULATED.3IONS-SCNC: 6 MMOL/L (ref 3–16)
BLOOD BANK DISPENSE STATUS: NORMAL
BLOOD BANK DISPENSE STATUS: NORMAL
BLOOD BANK PRODUCT CODE: NORMAL
BLOOD BANK PRODUCT CODE: NORMAL
BPU ID: NORMAL
BPU ID: NORMAL
BUN BLDV-MCNC: 14 MG/DL (ref 7–20)
CALCIUM SERPL-MCNC: 8.2 MG/DL (ref 8.3–10.6)
CHLORIDE BLD-SCNC: 97 MMOL/L (ref 99–110)
CO2: 30 MMOL/L (ref 21–32)
CREAT SERPL-MCNC: 0.9 MG/DL (ref 0.9–1.3)
DESCRIPTION BLOOD BANK: NORMAL
DESCRIPTION BLOOD BANK: NORMAL
GFR AFRICAN AMERICAN: >60
GFR NON-AFRICAN AMERICAN: >60
GLUCOSE BLD-MCNC: 143 MG/DL (ref 70–99)
HCT VFR BLD CALC: 29.4 % (ref 40.5–52.5)
HEMOGLOBIN: 10 G/DL (ref 13.5–17.5)
MCH RBC QN AUTO: 31.3 PG (ref 26–34)
MCHC RBC AUTO-ENTMCNC: 33.9 G/DL (ref 31–36)
MCV RBC AUTO: 92.2 FL (ref 80–100)
PDW BLD-RTO: 13.7 % (ref 12.4–15.4)
PLATELET # BLD: 116 K/UL (ref 135–450)
PMV BLD AUTO: 8.1 FL (ref 5–10.5)
POTASSIUM SERPL-SCNC: 4.3 MMOL/L (ref 3.5–5.1)
POTASSIUM SERPL-SCNC: 4.3 MMOL/L (ref 3.5–5.1)
POTASSIUM SERPL-SCNC: 4.5 MMOL/L (ref 3.5–5.1)
RBC # BLD: 3.19 M/UL (ref 4.2–5.9)
SODIUM BLD-SCNC: 133 MMOL/L (ref 136–145)
WBC # BLD: 8.8 K/UL (ref 4–11)

## 2022-08-04 PROCEDURE — 80048 BASIC METABOLIC PNL TOTAL CA: CPT

## 2022-08-04 PROCEDURE — 2000000000 HC ICU R&B

## 2022-08-04 PROCEDURE — 94640 AIRWAY INHALATION TREATMENT: CPT

## 2022-08-04 PROCEDURE — 2580000003 HC RX 258: Performed by: HOSPITALIST

## 2022-08-04 PROCEDURE — 85027 COMPLETE CBC AUTOMATED: CPT

## 2022-08-04 PROCEDURE — 6370000000 HC RX 637 (ALT 250 FOR IP): Performed by: NURSE PRACTITIONER

## 2022-08-04 PROCEDURE — 97530 THERAPEUTIC ACTIVITIES: CPT

## 2022-08-04 PROCEDURE — 6370000000 HC RX 637 (ALT 250 FOR IP): Performed by: THORACIC SURGERY (CARDIOTHORACIC VASCULAR SURGERY)

## 2022-08-04 PROCEDURE — 6360000002 HC RX W HCPCS: Performed by: THORACIC SURGERY (CARDIOTHORACIC VASCULAR SURGERY)

## 2022-08-04 PROCEDURE — 97535 SELF CARE MNGMENT TRAINING: CPT

## 2022-08-04 PROCEDURE — 94669 MECHANICAL CHEST WALL OSCILL: CPT

## 2022-08-04 PROCEDURE — 84132 ASSAY OF SERUM POTASSIUM: CPT

## 2022-08-04 PROCEDURE — 97110 THERAPEUTIC EXERCISES: CPT

## 2022-08-04 PROCEDURE — 2580000003 HC RX 258: Performed by: THORACIC SURGERY (CARDIOTHORACIC VASCULAR SURGERY)

## 2022-08-04 PROCEDURE — 6370000000 HC RX 637 (ALT 250 FOR IP): Performed by: HOSPITALIST

## 2022-08-04 PROCEDURE — 97116 GAIT TRAINING THERAPY: CPT

## 2022-08-04 PROCEDURE — 2700000000 HC OXYGEN THERAPY PER DAY

## 2022-08-04 PROCEDURE — 94761 N-INVAS EAR/PLS OXIMETRY MLT: CPT

## 2022-08-04 RX ORDER — TRAMADOL HYDROCHLORIDE 50 MG/1
50 TABLET ORAL EVERY 4 HOURS PRN
Status: DISCONTINUED | OUTPATIENT
Start: 2022-08-04 | End: 2022-08-05 | Stop reason: HOSPADM

## 2022-08-04 RX ADMIN — Medication 10 ML: at 20:49

## 2022-08-04 RX ADMIN — OXYCODONE 10 MG: 5 TABLET ORAL at 20:47

## 2022-08-04 RX ADMIN — Medication 10 ML: at 21:00

## 2022-08-04 RX ADMIN — OXYCODONE 10 MG: 5 TABLET ORAL at 14:50

## 2022-08-04 RX ADMIN — MUPIROCIN: 20 OINTMENT TOPICAL at 20:51

## 2022-08-04 RX ADMIN — Medication 2 PUFF: at 20:16

## 2022-08-04 RX ADMIN — Medication 2 PUFF: at 07:51

## 2022-08-04 RX ADMIN — FONDAPARINUX SODIUM 2.5 MG: 2.5 INJECTION, SOLUTION SUBCUTANEOUS at 09:30

## 2022-08-04 RX ADMIN — ACETAMINOPHEN 1000 MG: 500 TABLET ORAL at 06:16

## 2022-08-04 RX ADMIN — ACETAMINOPHEN 1000 MG: 500 TABLET ORAL at 17:51

## 2022-08-04 RX ADMIN — POTASSIUM CHLORIDE 10 MEQ: 750 TABLET, FILM COATED, EXTENDED RELEASE ORAL at 12:31

## 2022-08-04 RX ADMIN — ASPIRIN 325 MG: 325 TABLET, COATED ORAL at 08:58

## 2022-08-04 RX ADMIN — MUPIROCIN: 20 OINTMENT TOPICAL at 12:32

## 2022-08-04 RX ADMIN — Medication 2 PUFF: at 15:28

## 2022-08-04 RX ADMIN — METOPROLOL TARTRATE 12.5 MG: 25 TABLET, FILM COATED ORAL at 08:57

## 2022-08-04 RX ADMIN — DIPHENHYDRAMINE HYDROCHLORIDE 25 MG: 25 TABLET ORAL at 04:19

## 2022-08-04 RX ADMIN — CLONAZEPAM 0.5 MG: 1 TABLET ORAL at 00:03

## 2022-08-04 RX ADMIN — GABAPENTIN 200 MG: 100 CAPSULE ORAL at 20:48

## 2022-08-04 RX ADMIN — PANTOPRAZOLE SODIUM 40 MG: 40 TABLET, DELAYED RELEASE ORAL at 08:58

## 2022-08-04 RX ADMIN — Medication 400 MG: at 08:58

## 2022-08-04 RX ADMIN — POTASSIUM CHLORIDE 10 MEQ: 750 TABLET, FILM COATED, EXTENDED RELEASE ORAL at 17:51

## 2022-08-04 RX ADMIN — OXYCODONE 10 MG: 5 TABLET ORAL at 04:05

## 2022-08-04 RX ADMIN — CLONAZEPAM 0.5 MG: 1 TABLET ORAL at 16:53

## 2022-08-04 RX ADMIN — Medication 10 ML: at 20:48

## 2022-08-04 RX ADMIN — POTASSIUM CHLORIDE 20 MEQ: 29.8 INJECTION, SOLUTION INTRAVENOUS at 12:33

## 2022-08-04 RX ADMIN — METOPROLOL TARTRATE 25 MG: 25 TABLET, FILM COATED ORAL at 20:48

## 2022-08-04 RX ADMIN — ACETAMINOPHEN 1000 MG: 500 TABLET ORAL at 14:50

## 2022-08-04 RX ADMIN — OXYCODONE 10 MG: 5 TABLET ORAL at 08:15

## 2022-08-04 RX ADMIN — METHOCARBAMOL 500 MG: 500 TABLET ORAL at 08:57

## 2022-08-04 RX ADMIN — TRAMADOL HYDROCHLORIDE 50 MG: 50 TABLET, COATED ORAL at 12:31

## 2022-08-04 RX ADMIN — METHOCARBAMOL 500 MG: 500 TABLET ORAL at 12:31

## 2022-08-04 RX ADMIN — SENNOSIDES AND DOCUSATE SODIUM 1 TABLET: 50; 8.6 TABLET ORAL at 08:58

## 2022-08-04 RX ADMIN — METHOCARBAMOL 500 MG: 500 TABLET ORAL at 20:48

## 2022-08-04 RX ADMIN — METHOCARBAMOL 500 MG: 500 TABLET ORAL at 17:52

## 2022-08-04 RX ADMIN — POTASSIUM CHLORIDE 20 MEQ: 29.8 INJECTION, SOLUTION INTRAVENOUS at 06:41

## 2022-08-04 RX ADMIN — GABAPENTIN 200 MG: 100 CAPSULE ORAL at 08:58

## 2022-08-04 RX ADMIN — Medication 400 MG: at 20:48

## 2022-08-04 RX ADMIN — TRAMADOL HYDROCHLORIDE 50 MG: 50 TABLET, COATED ORAL at 16:46

## 2022-08-04 RX ADMIN — FUROSEMIDE 20 MG: 10 INJECTION, SOLUTION INTRAMUSCULAR; INTRAVENOUS at 08:58

## 2022-08-04 RX ADMIN — METOPROLOL TARTRATE 12.5 MG: 25 TABLET, FILM COATED ORAL at 14:50

## 2022-08-04 RX ADMIN — FUROSEMIDE 20 MG: 10 INJECTION, SOLUTION INTRAMUSCULAR; INTRAVENOUS at 17:51

## 2022-08-04 RX ADMIN — POTASSIUM CHLORIDE 10 MEQ: 750 TABLET, FILM COATED, EXTENDED RELEASE ORAL at 08:57

## 2022-08-04 RX ADMIN — MAGNESIUM CITRATE 296 ML: 1.75 LIQUID ORAL at 12:19

## 2022-08-04 RX ADMIN — SENNOSIDES AND DOCUSATE SODIUM 1 TABLET: 50; 8.6 TABLET ORAL at 20:48

## 2022-08-04 ASSESSMENT — PAIN SCALES - GENERAL
PAINLEVEL_OUTOF10: 0
PAINLEVEL_OUTOF10: 8
PAINLEVEL_OUTOF10: 5
PAINLEVEL_OUTOF10: 6
PAINLEVEL_OUTOF10: 0
PAINLEVEL_OUTOF10: 6
PAINLEVEL_OUTOF10: 0
PAINLEVEL_OUTOF10: 6
PAINLEVEL_OUTOF10: 0
PAINLEVEL_OUTOF10: 7
PAINLEVEL_OUTOF10: 0
PAINLEVEL_OUTOF10: 8

## 2022-08-04 ASSESSMENT — PAIN DESCRIPTION - DESCRIPTORS
DESCRIPTORS: ACHING
DESCRIPTORS: ACHING

## 2022-08-04 ASSESSMENT — PAIN DESCRIPTION - ORIENTATION: ORIENTATION: ANTERIOR

## 2022-08-04 ASSESSMENT — PAIN DESCRIPTION - LOCATION
LOCATION: STERNUM
LOCATION: STERNUM
LOCATION: CHEST

## 2022-08-04 ASSESSMENT — PAIN DESCRIPTION - PAIN TYPE: TYPE: SURGICAL PAIN

## 2022-08-04 ASSESSMENT — PAIN DESCRIPTION - FREQUENCY: FREQUENCY: INTERMITTENT

## 2022-08-04 ASSESSMENT — PAIN DESCRIPTION - ONSET: ONSET: PROGRESSIVE

## 2022-08-04 NOTE — CARE COORDINATION
Lorna Weber with [facility] in [phone] called back and states they can accept patient. Information placed on cristofer. Will need to faxed cristofer and avs on discharge if over the weekend. BARBRA provided her with CM office number for weekend. Barbra updated pt.        eDnilson Quiroz RN, BSN  [phone]

## 2022-08-04 NOTE — DISCHARGE INSTR - COC
Continuity of Care Form    Patient Name: Cyril Herzog Sr.   :  1970  MRN:  [id number]    Admit date:  2022  Discharge date:  2022      Code Status Order: Full Code   Advance Directives:     Admitting Physician:  No admitting provider for patient encounter. PCP: Guevara REYES    Discharging Nurse: [facility] Unit/Room#: CVU-2906/2906-01  Discharging Unit Phone Number: [phone]    Emergency Contact:   Extended Emergency Contact Information  Primary Emergency Contact: Sam Guo  Address: [place] [address]  Home Phone: [phone]  Relation: Brother/Sister    Past Surgical History:  Past Surgical History:   Procedure Laterality Date    APPENDECTOMY      BACK SURGERY      CORONARY ANGIOPLASTY WITH STENT PLACEMENT      CORONARY ARTERY BYPASS GRAFT N/A 2022    URGENT CABG CORONARY ARTERY BYPASS X 3, LIMA GRAFT TO DISTAL LAD, VEIN GRAFT TO RCA, VEIN GRAFT TO OM1, LIGATION DEJAH WITH 40MM ATRICLIP, EVH LEFT LEG, EXCISION OF PRESTERNAL SEBACEOUS CYST, TYLER, TOTAL CPB, INSERTION OF TEMPORARY VENTRICULAR PACING WIRES, DOPPLER BYPASS GRAFT FLOW VERIFICATION, 5 LEVEL BILATERAL INTERCOSTAL NERVE BLOCK USING EXPAREL AND MARCAINE performed by MD JEFFREY Long  11/15/2020    NASAL SEPTUM SURGERY  2014       Immunization History: There is no immunization history on file for this patient.     Active Problems:  Patient Active Problem List   Diagnosis Code    Unstable angina (HCC) I20.0    Coronary artery disease involving native coronary artery of native heart without angina pectoris I25.10    Essential hypertension I10    Mixed hyperlipidemia E78.2    Hypercholesteremia E78.00    Tobacco abuse Z72.0    Coronary artery disease due to lipid rich plaque I25.10, I25.83    Chest pain R07.9    Chest discomfort R07.89       Isolation/Infection:   Isolation            No Isolation          Patient Infection Status       None to display Nurse Assessment:  Last Vital Signs: /69   Pulse 99   Temp 97.5 °F (36.4 °C) (Temporal)   Resp 18   Ht 6' 1\" (1.854 m)   Wt 218 lb 0.6 oz (98.9 kg)   SpO2 94%   BMI 28.77 kg/m²     Last documented pain score (0-10 scale): Pain Level: 8  Last Weight:   Wt Readings from Last 1 Encounters:   08/04/22 218 lb 0.6 oz (98.9 kg)     Mental Status:  oriented, alert, coherent, logical, thought processes intact, and able to concentrate and follow conversation    IV Access:  - None    Nursing Mobility/ADLs:  Walking   Independent  Transfer  Independent  [facility]  Med Delivery   whole    Wound Care Documentation and Therapy:  Incision Sternum (Active)   Dressing Status Clean;Dry; Intact 08/04/22 0800   Dressing/Treatment Negative pressure wound therapy; Tegaderm/transparent film dressing 08/04/22 0800   Closure Other (Comment) 08/04/22 0800   Drainage Amount None 08/04/22 0800   Oliva-incision Assessment Intact 08/04/22 0800   Number of days:        Incision Knee Anterior;Left;Medial (Active)   Dressing Status Clean;Dry; Intact 08/04/22 0800   Incision Cleansed Soap and water 08/04/22 0400   Dressing/Treatment Open to air 08/04/22 0800   Closure Steri-Strips 08/04/22 0800   Oliva-incision Assessment Intact 08/04/22 0400   Number of days:         Elimination:  Continence: Bowel: Yes  Bladder: Yes  Urinary Catheter: None   Colostomy/Ileostomy/Ileal Conduit: No       Date of Last BM: 8/5/2022      Intake/Output Summary (Last 24 hours) at 8/4/2022 1524  Last data filed at 8/4/2022 1250  Gross per 24 hour   Intake 1000 ml   Output 2850 ml   Net -1850 ml     I/O last 3 completed shifts:   In: 4222 [P.O.:2400; I.V.:1171.9; IV Piggyback:650.1]  Out: 6762 [Urine:2725]    Safety Concerns:     None    Impairments/Disabilities:      None    Nutrition Therapy:  Current Nutrition Therapy:   - Oral Diet: General    Routes of Feeding: Oral  Liquids: No Restrictions  Daily Fluid Restriction: no  Last Modified Barium Swallow with Video (Video Swallowing Test): not done    Treatments at the Time of Hospital Discharge:   Respiratory Treatments: n/a  Oxygen Therapy:  is not on home oxygen therapy. Ventilator:    - No ventilator support    Rehab Therapies: Physical Therapy and Occupational Therapy  Weight Bearing Status/Restrictions: No weight bearing restrictions  Other Medical Equipment (for information only, NOT a DME order):  n/a    Other Treatments: n/a    Patient's personal belongings (please select all that are sent with patient):  Shirt, pants, sock, shirts, underwear, cell phone,     RN SIGNATURE:  {Esignature:[id number]}    CASE MANAGEMENT/SOCIAL WORK SECTION    Inpatient Status Date: 7/29/2022    Readmission Risk Assessment Score:  Readmission Risk              Risk of Unplanned Readmission:  [id number]           Discharging to Facility/ Agency   Name: [facility]   Address:  [facility]:[phone]  MARIA ALEJANDRA:[phone]    Dialysis Facility (if applicable)   Name:  Address:  Dialysis Schedule:  Phone:  Fax:    / signature: Belkys Muhammad RN, BSN  [phone]     PHYSICIAN SECTION    Prognosis: Good    Condition at Discharge: Stable    Rehab Potential (if transferring to Rehab): Good    Recommended Labs or Other Treatments After Discharge:     Physician Certification: I certify the above information and transfer of Hari Fairbanks.  is necessary for the continuing treatment of the diagnosis listed and that he requires Home Care for less 30 days.      Update Admission H&P: No change in H&P    PHYSICIAN SIGNATURE:  Electronically signed by AZALIA Matamoros CNP on 8/5/22 at 12:58 PM EDT

## 2022-08-04 NOTE — PROGRESS NOTES
[facility]   Phone: [phone]    Occupational Therapy    [x] Initial Evaluation            [] Daily Treatment Note         [] Discharge Summary      Patient: Car Blanca Sr.   : 1970   MRN: [id number]   Date of Service:  2022    Admitting Diagnosis:  Chest pain  Current Admission Summary: The patient is a 47 y.o. male with significant past medical history of HTN, CAD w/ coronary angioplasty with 4x stents ( and ), and Mitral incompetence, GERD, Peptic Ulcer disease, Gracia's Esophagus, Plaque Psoriasis, neck surgery, ankle surgery, and appendectomy who presented to the Emergency Department on 22 with intermittent left sided chest pain that radiated to his left neck and shoulder. This pain was alleviated with rest.  Endorses shortness of breath with exertion. Patient saw Dr. Morenita Gordon the day prior to admission in the office and was instructed to go to the emergency department if symptoms continued for evaluation and heart cath. Left heart angiogram found severe multivessel coronary artery disease. Pt s/p CABGx3 on 2022. Patient is currently hemodynamically stable and denying any complaints of chest pain or shortness  Past Medical History:  has a past medical history of Hypertension and MI (mitral incompetence). Past Surgical History:  has a past surgical history that includes Coronary angioplasty with stent; Appendectomy; Nasal septum surgery (2014); LASIK (11/15/2020); back surgery; and Coronary artery bypass graft (N/A, 2022). Discharge Recommendations: Robe Lerma scored a 19/24 on the AM-PAC ADL Inpatient form. Current research shows that an AM-PAC score of 18 or greater is typically associated with a discharge to the patient's home setting.  Based on the patient's AM-PAC score, and their current ADL deficits, it is recommended that the patient have 1-2 sessions per week of Occupational Therapy at d/c to increase the patient's independence. At this time, this patient demonstrates the endurance and safety to discharge home with family support. see assessment section for further patient specific details. If patient discharges prior to next session this note will serve as a discharge summary. Please see below for the latest assessment towards goals. DME Required For Discharge: DME to be determined pending patient progress Pt may benefit from shower chair/tub bench for energy conservation/safety. Positional Restrictions:Sternal Precautions - No Pushing, Pulling, Lifting > 5 lbs    Pre-Admission Information   Lives With: alone    Type of Home: house  Home Layout: tri-level  Home Access:  1 step to enter with handrail. Handrails are located on right side. Bathroom Layout: tub/shower unit  Bathroom Equipment: . Comment: Pt does not have any bathroom equipment, discussed tub bench/shower chair for energy conservation/safety at home. Toilet Height: standard height  Home Equipment: no prior equipment  Transfer Assistance: Independent without use of device  Ambulation Assistance:Independent without use of device  ADL Assistance: independent with all ADL's  IADL Assistance: independent with homemaking tasks  Active :        [x] Yes  [] No  Hand Dominance: [] Left  [x] Right  Current Employment: full time employment. Occupation:   Hobbies:   Recent Falls: no falls       Subjective  General: Pt supine in bed upon entry, pleasant and agreeable to OT session.    Pain: Patient does not rate upon questioning   Pain Interventions: not applicable         Activities of Daily Living  Basic Activities of Daily Living  Upper Extremity Dressing: minimal assistance requires verbal cueing Increased time to complete task  Lower Extremity Dressing: setup assistance supervision  Dressing Comments: min A to fully kyara shirt over trunk, pt donned/doffed T-shirt seated EOB with increased time and cueing for sequence    Pt reports he showered last night with RN. Instrumental Activities of Daily Living  No IADL completed on this date. Functional Mobility  Bed Mobility  Supine to Sit: maximum assistance  Rolling Left: moderate assistance  Scooting: stand by assistance  Comments: Pt performed log roll technique towards (L) side-max A required, cueing for sequence-use of heart pillow  Transfers  Sit to stand transfer:stand by assistance, contact guard assistance  Stand to sit transfer: stand by assistance, contact guard assistance  Stand step transfer: stand by assistance  Comments: CGA progressing to SBA  Functional Mobility:  Sitting Balance: Independent, modified independent. Standing Balance: stand by assistance. Functional Mobility: .  stand by assistance  Functional Mobility Device Use: no device, short bout of ambulation performed in room w/o device-use of heart pillow     Other Therapeutic Interventions    Pt performed item retrieval/transportation to closet to gather clothing items for dressing. Pt performed SBA, increased time to complete-maintained sternal precautions. Pt provided with ADL/IADL safety packet addressing functional tasks (bed mobility, transfers, ADL's and IADL tasks) while maintaining sternal precautions.  Pt verbalized understanding     Functional Outcomes  AM-PAC Inpatient Daily Activity Raw Score: 19    Cognition  WFL  Orientation:    alert and oriented x 4  Command Following:   [facility]     Education  Barriers To Learning: none  Patient Education: patient educated on goals, OT role and benefits, plan of care, precautions, ADL adaptive strategies, IADL safety, energy conservation, disease specific education, transfer training, discharge recommendations  Learning Assessment:  patient verbalizes and demonstrates understanding    Assessment  Activity Tolerance: pt with fair activity tolerance for transfers and functional mobility, pt encouraged to self monitor fatigue and pace himself, initiate rest breaks as needed  Impairments Requiring Therapeutic Intervention: decreased functional mobility, decreased endurance  Prognosis: excellent  Clinical Assessment: 47year old male s/p CABGx3 [id number]. Pt requiring max A to perform sup > sit to the (L) CGA progressing to SBA to perform functional transfers/mobility in room. Improving in endurance to improve performance in ADL/IADLs. Pt presents to OT with decreased endurance and activity tolerance for functional mobility and multi step ADLs.   Safety Interventions: patient left in chair, call light within reach, and nurse notified    Plan  Frequency: 1-2 x/per week  Current Treatment Recommendations: endurance training, patient/caregiver education, and home management training    Goals  Patient Goals: go home   Short Term Goals:  Time Frame: upon discharge  Patient will complete lower body ADL at Independent   Patient will complete functional transfers at Independent   Patient will complete functional mobility at modified independent   Patient will increase [facility] ADL score = to or > than 24/24    All goals are ongoing 8/4    Therapy Session Time     Individual Group Co-treatment   Time In 1053     Time Out 1156      Minutes 63           Timed Code Treatment Minutes:   63 Minutes  Total Treatment Minutes:  63 Minutes     Electronically Signed By: Nayan Witt, [address] [place] [address]

## 2022-08-04 NOTE — PROGRESS NOTES
Cardiothoracic Surgery Progress Note    CC: [place], Severe 3V CAD, HTN, HLD    S/P: URGENT CABG CORONARY ARTERY BYPASS X 3/DEJAH  EXCISION OF PRESTERNAL SEBACEOUS CYST    POD# 3    Continue Aggressive Pulmonary Toilet    Subjective:  Hemodynamically stable, 1L NC, O2 Sats  96% afebrile. Alert and oriented X 3.  Weight  98.9 kg this am.     WT:102.7 kg/101.3 kg   pre-op 95.8 kg    Vital Signs:   /74   Pulse (!) 101   Temp 98.5 °F (36.9 °C) (Temporal)   Resp 16   Ht 6' 1\" (1.854 m)   Wt 218 lb 0.6 oz (98.9 kg)   SpO2 96%   BMI 28.77 kg/m²     IJ    Physical Exam:   Cardiac: regular, no murmur; NSR, ST  Lungs: clear to auscultation, decreased bases bilaterally  Abdomen:  No BM, ABD soft/non-tender gas noted  Vascular:  pulses all palpable   Extremities: generalized, non-pitting edema 1+  :  /750/ 400   Lasix 20 IV BID    Chest Tube(s) & Incision(s):    Sternum: stable,C/D/I   Edges approximated, no drainage  Chest tube site: C/D/I  Purse string intact    Left leg incision:   C/D/I  Edges well approximated, no drainage   Prevena Dressing w/ scant ss drainage       Labs:   CBC:   Recent Labs     08/03/22  0415 08/04/22  0416   WBC 11.7* 8.8   HGB 10.4* 10.0*   HCT 30.4* 29.4*   * 116*     BMP:   Recent Labs     08/02/22  0400 08/03/22  0415 08/04/22  0416 08/04/22  0906   K 4.4 4.0 4.3 4.3   CREATININE 1.1 1.0 0.9  --    CALCIUM 8.5 8.3 8.2*  --    MG 1.80 1.70*  --   --      PT/INR:   Recent Labs     08/02/22  0400 08/03/22  0415   PROTIME 15.5* 16.4*   INR 1.24* 1.33*     Path:  sebaceous cyst  Pre-sternal sebaceous cyst, excision:      - Epidermal inclusion cyst    Assessment/Plan:  As per CC:     Plan:   CABG X 3/ DEJAH/removal of sebaceous cyst  Aggressive IS - EZPAP  Diuresis - strict I/O, fluid restriction   Wean oxygen  Laxative of choice - will add Mag citrate  Ambulate  Will add Ultram as alterative   May be able to add 12.5 of Lopressor at noon  Will discuss w/Dr. Jennifer Lucero regarding appropriate Cholesterol control  PLC down from the 3rd 122, today 116  K+ 4.3     Disp:  Plan for home with home care       Electronically signed by AZALIA Matamoros CNP on 8/4/2022 at 10:53 AM      Attending Supervising Physicians Attestation Statement  The patient met the criteria for direct supervision. I saw and examined the patient and discussed the findings and plans with the nurse practitioner and agree as documented in his note .

## 2022-08-04 NOTE — CARE COORDINATION
CM called [facility] in [address] [phone] and was transferred to intake and left  and call back number. CM called [facility] in [address] [place] [address] W [phone] and was transferred to clinical manager and left  and call back number.       Amanda Murphy RN, BSN  [phone]

## 2022-08-04 NOTE — CARE COORDINATION
CM met with pt. Pt had CABG and will need home care services. Pt lives locally but will be going to brother's house on discharge. The address he will be discharging to is: [address], [address] [id number].     BARBRA sent epic referrals to the following hc agencies in [place] : [facility], [facility], [facility]    Brian Vásquez RN, BSN  [phone]

## 2022-08-04 NOTE — RT PROTOCOL NOTE
RT Inhaler-Nebulizer Bronchodilator Protocol Note    There is a bronchodilator order in the chart from a provider indicating to follow the RT Bronchodilator Protocol and there is an Initiate RT Inhaler-Nebulizer Bronchodilator Protocol order as well (see protocol at bottom of note). CXR Findings:  XR CHEST PORTABLE    Result Date: 8/2/2022  Redemonstration of bibasilar opacities, which may reflect underlying atelectasis. Correlate clinically with signs of pneumonia. Suggestion of small left pleural effusion. No evidence of pneumothorax seen. Low lung volumes. XR CHEST PORTABLE    Result Date: 8/2/2022  1. Worsening bibasilar airspace disease favoring atelectasis. The findings from the last RT Protocol Assessment were as follows:   History Pulmonary Disease: Chronic pulmonary disease  Respiratory Pattern: Mild dyspnea at rest, irregular pattern, or RR 21-25 bpm  Breath Sounds: Slightly diminished and/or crackles  Cough: Weak, productive  Indication for Bronchodilator Therapy:    Bronchodilator Assessment Score: 10    Aerosolized bronchodilator medication orders have been revised according to the RT Inhaler-Nebulizer Bronchodilator Protocol below. Respiratory Therapist to perform RT Therapy Protocol Assessment initially then follow the protocol. Repeat RT Therapy Protocol Assessment PRN for score 0-3 or on second treatment, BID, and PRN for scores above 3. No Indications - adjust the frequency to every 6 hours PRN wheezing or bronchospasm, if no treatments needed after 48 hours then discontinue using Per Protocol order mode. If indication present, adjust the RT bronchodilator orders based on the Bronchodilator Assessment Score as indicated below.   Use Inhaler orders unless patient has one or more of the following: on home nebulizer, not able to hold breath for 10 seconds, is not alert and oriented, cannot activate and use MDI correctly, or respiratory rate 25 breaths per minute or more, then use the equivalent nebulizer order(s) with same Frequency and PRN reasons based on the score. If a patient is on this medication at home then do not decrease Frequency below that used at home. 0-3 - enter or revise RT bronchodilator order(s) to equivalent RT Bronchodilator order with Frequency of every 4 hours PRN for wheezing or increased work of breathing using Per Protocol order mode. 4-6 - enter or revise RT Bronchodilator order(s) to two equivalent RT bronchodilator orders with one order with BID Frequency and one order with Frequency of every 4 hours PRN wheezing or increased work of breathing using Per Protocol order mode. 7-10 - enter or revise RT Bronchodilator order(s) to two equivalent RT bronchodilator orders with one order with TID Frequency and one order with Frequency of every 4 hours PRN wheezing or increased work of breathing using Per Protocol order mode. 11-13 - enter or revise RT Bronchodilator order(s) to one equivalent RT bronchodilator order with QID Frequency and an Albuterol order with Frequency of every 4 hours PRN wheezing or increased work of breathing using Per Protocol order mode. Greater than 13 - enter or revise RT Bronchodilator order(s) to one equivalent RT bronchodilator order with every 4 hours Frequency and an Albuterol order with Frequency of every 2 hours PRN wheezing or increased work of breathing using Per Protocol order mode. RT to enter RT Home Evaluation for COPD & MDI Assessment order using Per Protocol order mode.     Electronically signed by Tian Alexander RCP on 8/4/2022 at 8:19 AM

## 2022-08-04 NOTE — PROGRESS NOTES
[facility]   Phone: [phone]    Physical Therapy    [] Initial Evaluation            [x] Daily Treatment Note         [] Discharge Summary      Patient: Corinne Pringle Sr.   : 1970   MRN: [id number]   Date of Service:  2022  Admitting Diagnosis: Chest pain  Current Admission Summary: Cora Gordon is a 47 y.o. male with past medical history of hypertension and CAD who presents to the ED with complaint of chest pain. He states he has had intermittent exertional chest pain to his left-sided chest that radiates into his left-sided neck/shoulder for the past several months. He states pain wax and wanes in intensity and seems to worsen with exertion. States alleviated with rest.  He states he saw a cardiologist, Dr. Maxine Ortega, yesterday in the office and was told to come to the emergency department should he have continued symptoms. Patient states he currently has pain that he rates as a 1.5 out of 10 and described as a burning to his left-sided chest with radiation into the neck. He states when pain gets worse he has associated shortness of breath. He denies shortness of breath currently. Denies cough, pleuritic pain, hemoptysis, orthopnea, pedal edema or calf tenderness. Denies diaphoresis, lightheadedness/dizziness, headache, syncope, near syncope, abdominal pain, nausea/vomiting, urinary symptoms or changes in bowel movements. States has history of previous CAD with stents x4. States last cardiac work-up was in 2016. States he is a smoker. Past Medical History:  has a past medical history of Hypertension and MI (mitral incompetence). Past Surgical History:  has a past surgical history that includes Coronary angioplasty with stent; Appendectomy; Nasal septum surgery (2014); LASIK (11/15/2020); back surgery; and Coronary artery bypass graft (N/A, 2022).     Discharge Recommendations: Corinne Pringle Sr. scored a 18/24 on the AM-PAC short mobility form. Current research shows that an AM-PAC score of 18 or greater is typically associated with a discharge to the patient's home setting. Based on the patient's AM-PAC score and their current functional mobility deficits, it is recommended that the patient have 2-3 sessions per week of Physical Therapy at d/c to increase the patient's independence. At this time, this patient demonstrates the endurance and safety to discharge home with home services and a follow up treatment frequency of 2-3x/wk. Please see assessment section for further patient specific details. If patient discharges prior to next session this note will serve as a discharge summary. Please see below for the latest assessment towards goals. DME Required For Discharge: rollator (4 wheel walker)  Precautions/Restrictions: medium fall risk, fluid restirction  Weight Bearing Restrictions: no restrictions  [] Right Upper Extremity  [] Left Upper Extremity [] Right Lower Extremity  [] Left Lower Extremity     Required Braces/Orthotics: no braces required   [] Right  [] Left  Positional Restrictions:Sternal Precautions - No Pushing, Pulling, Lifting > 5 lbs    Pre-Admission Information   Lives With: alone                     Type of Home: house  Home Layout: tri-level  Home Access:  1 step to enter with handrail. Handrails are located on right side. Bathroom Layout: tub/shower unit  Bathroom Equipment: . Comment: Pt does not have any bathroom equipment, discussed tub bench/shower chair for energy conservation/safety at home.    Toilet Height: standard height  Home Equipment: no prior equipment  Transfer Assistance: Independent without use of device  Ambulation Assistance:Independent without use of device  ADL Assistance: independent with all ADL's  IADL Assistance: independent with homemaking tasks  Active :        [x] Yes                 [] No  Hand Dominance: [] Left                 [x] Right  Current Employment: full time employment. Occupation:   Hobbies:   Recent Falls: no falls     8/4/22: Pt plans to d/c to brother's house. Will have 1 ANURADHA and 17 steps with unilateral rail to 2nd floor for shower. 1/2 bath on main floor and pt able to sleep in electric recliner. Bedrooms are upstairs but pt doesn't plan to stay up there. Subjective  General: Pt sitting in chair upon arrival with brother present. Pt agreeable to PT. Care package from family arrived however pt requests therapist throw it out as he states the family that provided it has COVID at this time. Therapist did as patient asked. Pain: 0/10  Pain Interventions: not applicable       Functional Mobility  Bed Mobility  Bed mobility not completed on this date. Comments: Pt up in chair at beginning/end of session  Transfers  Sit to stand transfer: stand by assistance  Stand to sit transfer: stand by assistance  Comments: Good adherence to sternal precautions with minimal cues from therapist. 1 trial from recliner, 1 trial from [facility]. Ambulation  Surface:level surface  Assistive Device: rollator (7CIH)  Assistance: contact guard assistance  Distance: >1,000 ft with stairs  Gait Mechanics: Pt ambulates with decreased step length, decreased step height (B), decreased heel strike (B), no LOB. Comments: Pt took one standing rest break for O2 check during stair training on 1st lap of unit. Moderate RESENDEZ noted on 2nd and 3rd lap of unit. Stair Mobility  Number of Steps: 9 (break after first 3 steps for O2 check)  Step Height: 6 inch  Hand Rails: (L) ascending handrail  Assistance: contact guard assistance  Comments: No LOB. Use of reciprocal pattern. Pt educated on step-to pattern for energy conservation. Discussed how to navigate single step with Rollator. Pt verbalized understanding. Wheelchair Mobility:  No w/c mobility completed on this date.   Comments:  Balance  Static Sitting Balance: good: independent with functional balance in unsupported position  Dynamic Sitting Balance: good: independent with functional balance in unsupported position  Static Standing Balance: fair: maintains balance at CGA without use of UE support  Dynamic Standing Balance: fair (-): maintains balance at CGA-SBA with use of UE support  Comments: Pt able to maintain static standing without UE support 2 x30 sec without sway. Unilateral-bilateral UE support for dynamic standing tasks    Other Therapeutic Interventions  Pt able to verbalize no pushing/pulling on sternal precautions - cues needed for no lifting. All reviewed. Seated marches x10 reps (B), LAQ x10 reps (B). Discussed techniques for getting in/out of bed. Pt plans to d/c to brother's house, note added to PLOF. Functional Outcomes  AM-PAC Inpatient Mobility Raw Score : 18              Cognition  WFL  Orientation:    alert and oriented x 4  Command Following:   [facility]    Education  Barriers To Learning: none  Patient Education: patient educated on goals, PT role and benefits, plan of care, precautions, HEP, general safety, family education, transfer training, discharge recommendations  Learning Assessment:  patient verbalizes and demonstrates understanding    Assessment  Activity Tolerance: Fair - moderate RESENDEZ noted on 2nd half of ambulation and after ascending/descending stairs. Impairments Requiring Therapeutic Intervention: decreased functional mobility, decreased strength, decreased endurance, decreased balance  Prognosis: good  Clinical Assessment: Pt is making good progress with tolerance to activity. He desaturates mildly with extended time on his feet and more strenuous activities (I.e. stairs). The patient would benefit from further stair training at next session. skilled PT services to promote safe return to OF.   Safety Interventions: patient left in chair, call light within reach, gait belt, patient at risk for falls, nurse notified, and family/caregiver present    Plan  Frequency: 3-5 x/per week  Current Treatment Recommendations: strengthening, ROM, balance training, functional mobility training, transfer training, gait training, stair training, endurance training, patient/caregiver education, safety education, and equipment evaluation/education    Goals  Patient Goals: To return to home and work   Short Term Goals:  Time Frame: Prior to D/C  Patient will complete bed mobility at modified independent   Patient will complete transfers at [facility]   Patient will ambulate 150 ft with use of LRAD at [facility]  Patient will ascend/descend 14 stairs with (R) ascending handrail at supervision  No goals met this date but progressing.     Therapy Session Time      Individual Group Co-treatment   Time In 1339       Time Out 1420       Minutes 41         Timed Code Treatment Minutes:  41 minutes  Total Treatment Minutes:  41 minutes       Electronically Signed By: Gabe Lara PT      Vincent Mccarthy PT, DPT #[id number]

## 2022-08-05 VITALS
DIASTOLIC BLOOD PRESSURE: 77 MMHG | RESPIRATION RATE: 16 BRPM | HEART RATE: 83 BPM | SYSTOLIC BLOOD PRESSURE: 108 MMHG | HEIGHT: 73 IN | TEMPERATURE: 97.6 F | WEIGHT: 219.13 LBS | OXYGEN SATURATION: 94 % | BODY MASS INDEX: 29.04 KG/M2

## 2022-08-05 LAB
ANION GAP SERPL CALCULATED.3IONS-SCNC: 8 MMOL/L (ref 3–16)
BUN BLDV-MCNC: 16 MG/DL (ref 7–20)
CALCIUM SERPL-MCNC: 8.2 MG/DL (ref 8.3–10.6)
CHLORIDE BLD-SCNC: 95 MMOL/L (ref 99–110)
CO2: 30 MMOL/L (ref 21–32)
CREAT SERPL-MCNC: 0.9 MG/DL (ref 0.9–1.3)
GFR AFRICAN AMERICAN: >60
GFR NON-AFRICAN AMERICAN: >60
GLUCOSE BLD-MCNC: 114 MG/DL (ref 70–99)
HCT VFR BLD CALC: 27.2 % (ref 40.5–52.5)
HEMOGLOBIN: 9.3 G/DL (ref 13.5–17.5)
MCH RBC QN AUTO: 31.5 PG (ref 26–34)
MCHC RBC AUTO-ENTMCNC: 34.3 G/DL (ref 31–36)
MCV RBC AUTO: 91.9 FL (ref 80–100)
PDW BLD-RTO: 13.2 % (ref 12.4–15.4)
PLATELET # BLD: 156 K/UL (ref 135–450)
PMV BLD AUTO: 8.5 FL (ref 5–10.5)
POTASSIUM SERPL-SCNC: 4.1 MMOL/L (ref 3.5–5.1)
RBC # BLD: 2.96 M/UL (ref 4.2–5.9)
SODIUM BLD-SCNC: 133 MMOL/L (ref 136–145)
WBC # BLD: 7.1 K/UL (ref 4–11)

## 2022-08-05 PROCEDURE — 6370000000 HC RX 637 (ALT 250 FOR IP): Performed by: NURSE PRACTITIONER

## 2022-08-05 PROCEDURE — 6370000000 HC RX 637 (ALT 250 FOR IP): Performed by: THORACIC SURGERY (CARDIOTHORACIC VASCULAR SURGERY)

## 2022-08-05 PROCEDURE — 80048 BASIC METABOLIC PNL TOTAL CA: CPT

## 2022-08-05 PROCEDURE — 6370000000 HC RX 637 (ALT 250 FOR IP): Performed by: HOSPITALIST

## 2022-08-05 PROCEDURE — 2580000003 HC RX 258: Performed by: HOSPITALIST

## 2022-08-05 PROCEDURE — 94761 N-INVAS EAR/PLS OXIMETRY MLT: CPT

## 2022-08-05 PROCEDURE — 85027 COMPLETE CBC AUTOMATED: CPT

## 2022-08-05 PROCEDURE — 94640 AIRWAY INHALATION TREATMENT: CPT

## 2022-08-05 PROCEDURE — 6360000002 HC RX W HCPCS: Performed by: THORACIC SURGERY (CARDIOTHORACIC VASCULAR SURGERY)

## 2022-08-05 PROCEDURE — 2580000003 HC RX 258: Performed by: THORACIC SURGERY (CARDIOTHORACIC VASCULAR SURGERY)

## 2022-08-05 RX ORDER — EZETIMIBE 10 MG/1
10 TABLET ORAL DAILY
Qty: 30 TABLET | Refills: 3 | Status: SHIPPED | OUTPATIENT
Start: 2022-08-05 | End: 2022-10-04 | Stop reason: SDUPTHER

## 2022-08-05 RX ORDER — METHOCARBAMOL 500 MG/1
500 TABLET, FILM COATED ORAL 4 TIMES DAILY
Qty: 28 TABLET | Refills: 0 | Status: SHIPPED | OUTPATIENT
Start: 2022-08-05 | End: 2022-08-12

## 2022-08-05 RX ORDER — SENNA AND DOCUSATE SODIUM 50; 8.6 MG/1; MG/1
1 TABLET, FILM COATED ORAL 2 TIMES DAILY
Qty: 30 TABLET | Refills: 0 | Status: ON HOLD | OUTPATIENT
Start: 2022-08-05 | End: 2022-09-25

## 2022-08-05 RX ORDER — OXYCODONE HYDROCHLORIDE 5 MG/1
5 TABLET ORAL EVERY 4 HOURS PRN
Qty: 28 TABLET | Refills: 0 | Status: SHIPPED | OUTPATIENT
Start: 2022-08-05 | End: 2022-08-12

## 2022-08-05 RX ORDER — POTASSIUM CHLORIDE 1500 MG/1
20 TABLET, FILM COATED, EXTENDED RELEASE ORAL DAILY
Qty: 7 TABLET | Refills: 0 | Status: ON HOLD
Start: 2022-08-05 | End: 2022-09-27 | Stop reason: HOSPADM

## 2022-08-05 RX ORDER — ALBUTEROL SULFATE 90 UG/1
2 AEROSOL, METERED RESPIRATORY (INHALATION) 2 TIMES DAILY
Status: DISCONTINUED | OUTPATIENT
Start: 2022-08-05 | End: 2022-08-05 | Stop reason: HOSPADM

## 2022-08-05 RX ORDER — FUROSEMIDE 20 MG/1
20 TABLET ORAL DAILY
Qty: 7 TABLET | Refills: 0 | Status: ON HOLD
Start: 2022-08-05 | End: 2022-09-27 | Stop reason: HOSPADM

## 2022-08-05 RX ADMIN — GABAPENTIN 200 MG: 100 CAPSULE ORAL at 09:14

## 2022-08-05 RX ADMIN — FONDAPARINUX SODIUM 2.5 MG: 2.5 INJECTION, SOLUTION SUBCUTANEOUS at 09:14

## 2022-08-05 RX ADMIN — Medication 10 ML: at 11:24

## 2022-08-05 RX ADMIN — POTASSIUM CHLORIDE 10 MEQ: 750 TABLET, FILM COATED, EXTENDED RELEASE ORAL at 14:07

## 2022-08-05 RX ADMIN — ACETAMINOPHEN 1000 MG: 500 TABLET ORAL at 14:06

## 2022-08-05 RX ADMIN — Medication 2 PUFF: at 08:22

## 2022-08-05 RX ADMIN — Medication 20 ML: at 11:23

## 2022-08-05 RX ADMIN — POTASSIUM CHLORIDE 10 MEQ: 750 TABLET, FILM COATED, EXTENDED RELEASE ORAL at 09:13

## 2022-08-05 RX ADMIN — TRAMADOL HYDROCHLORIDE 50 MG: 50 TABLET, COATED ORAL at 11:23

## 2022-08-05 RX ADMIN — Medication 400 MG: at 09:14

## 2022-08-05 RX ADMIN — DIAZEPAM 5 MG: 5 TABLET ORAL at 02:33

## 2022-08-05 RX ADMIN — OXYCODONE 5 MG: 5 TABLET ORAL at 02:32

## 2022-08-05 RX ADMIN — ACETAMINOPHEN 1000 MG: 500 TABLET ORAL at 07:58

## 2022-08-05 RX ADMIN — LISINOPRIL 2.5 MG: 5 TABLET ORAL at 14:07

## 2022-08-05 RX ADMIN — ACETAMINOPHEN 1000 MG: 500 TABLET ORAL at 02:33

## 2022-08-05 RX ADMIN — OXYCODONE 10 MG: 5 TABLET ORAL at 07:58

## 2022-08-05 RX ADMIN — ASPIRIN 325 MG: 325 TABLET, COATED ORAL at 09:13

## 2022-08-05 RX ADMIN — METHOCARBAMOL 500 MG: 500 TABLET ORAL at 16:47

## 2022-08-05 RX ADMIN — FUROSEMIDE 20 MG: 10 INJECTION, SOLUTION INTRAMUSCULAR; INTRAVENOUS at 09:14

## 2022-08-05 RX ADMIN — MUPIROCIN: 20 OINTMENT TOPICAL at 11:23

## 2022-08-05 RX ADMIN — METHOCARBAMOL 500 MG: 500 TABLET ORAL at 09:14

## 2022-08-05 RX ADMIN — METOPROLOL TARTRATE 25 MG: 25 TABLET, FILM COATED ORAL at 09:13

## 2022-08-05 RX ADMIN — PANTOPRAZOLE SODIUM 40 MG: 40 TABLET, DELAYED RELEASE ORAL at 09:14

## 2022-08-05 ASSESSMENT — PAIN SCALES - GENERAL
PAINLEVEL_OUTOF10: 2
PAINLEVEL_OUTOF10: 5
PAINLEVEL_OUTOF10: 3
PAINLEVEL_OUTOF10: 0
PAINLEVEL_OUTOF10: 6
PAINLEVEL_OUTOF10: 4
PAINLEVEL_OUTOF10: 0
PAINLEVEL_OUTOF10: 0
PAINLEVEL_OUTOF10: 3
PAINLEVEL_OUTOF10: 3

## 2022-08-05 ASSESSMENT — PAIN DESCRIPTION - FREQUENCY
FREQUENCY: INTERMITTENT
FREQUENCY: INTERMITTENT

## 2022-08-05 ASSESSMENT — PAIN DESCRIPTION - DESCRIPTORS
DESCRIPTORS: ACHING
DESCRIPTORS: ACHING

## 2022-08-05 ASSESSMENT — PAIN DESCRIPTION - ONSET
ONSET: PROGRESSIVE
ONSET: ON-GOING

## 2022-08-05 ASSESSMENT — PAIN DESCRIPTION - PAIN TYPE
TYPE: SURGICAL PAIN
TYPE: SURGICAL PAIN

## 2022-08-05 ASSESSMENT — PAIN DESCRIPTION - ORIENTATION
ORIENTATION: MID

## 2022-08-05 ASSESSMENT — PAIN DESCRIPTION - LOCATION
LOCATION: STERNUM
LOCATION: CHEST
LOCATION: STERNUM
LOCATION: STERNUM

## 2022-08-05 NOTE — DISCHARGE INSTRUCTIONS
Discharge Instructions Following Open Heart Surgery      Do you have the help you need at home? Supplies you will need at home: Accurate Scale  Digital Thermometer  Antibacterial Soap  Clean Wash Cloths  Someone to be with you for one week      Activity Instructions:  Do not lift, push, or pull anything over 5 pounds for 8 weeks from the day of surgery. This could prevent your breastbone from healing properly. (A gallon of milk is more than 5 pounds so you should buy ½ gallons). When you are given permission from your surgeon to begin lifting again, do so gradually. You will need time to build your muscle strength. Housework - Do not cut the grass, vacuum, sweep or do any heavy housework. Work - Returning to work may take 4 weeks if you have a desk job. But if you have a more physical job you may need to wait up to 12 weeks. Consult your doctor. Driving - Do not drive a car or any vehicle for 4 weeks from the day of surgery. You cannot drive a truck, tractor or  for 8 weeks from the day of surgery. After 4 weeks, you can drive vehicles with power steering only. Do not drive while on pain medication. Riding - You may ride in a car for local trips, up to 45 minutes. If you have to travel further then 2 hours stop every 45 minutes. Wear your lap and shoulder seatbelts in the car. Place your heart pillow between your chest and the shoulder seatbelt. Walking & Stair Climbing - Use your activity diary in your binder for your walking plan. Plan to walk indoors on days the temperature is below 40º or over 80º or during smog alerts. At first limit your stair climbing to once or twice a day. You may use the handrail for balance only. Do not pull yourself up with it. It is not unusual to feel tired for the first few weeks, but walking builds up your strength. Sleeping - If you have trouble sleeping during the night, take your pain medication at bedtime.  Some people prefer to sleep in a primary care physician if your blood sugar is consistently above 130. Good tissue healing occurs when blood sugars are less than 130. Weight/Temperature: Take your temperature and weigh yourself every morning. You will be given a log to record your temperature and weight. You should take the log with you to your doctor appointments. Call the surgeon if you gain 3 pounds or more overnight or 5 pounds in a week. This may be a sign of fluid retention. Also, call the surgeon if your temperature is greater than 101º. Depression: It is not unusual to have feelings of anxiety, fear or depression after surgery. If you need help with these feelings, call your primary care physician. There are medications to help and healing usually occurs sooner if you are not depressed. Cardiac Rehab Information provided: [phone]  Your physician has referred you to Cardiac Rehab. This is an important part of your recovery. You have been given a brief explanation of Cardiac Rehab and instructions when to call Cardiac Rehab. The Cardiac Rehab team works with your cardiologist to start your Rehab at the appropriate time in your recovery. Remember to discuss Cardiac Rehab with your physician at your first follow-up visit. Medication:  Take pain medication as ordered to be comfortable and able to increase activity level. Pain medications can cause drowsiness, constipation, confusion, nausea and itching. Your need for pain medication should decrease over the next two weeks. For mild pain you may take Tylenol (acetaminophen), but do not exceed 4000mg of Tylenol in one day. You may have been prescribed Colace (docusate), which is a stool softener to help prevent straining with bowel movements. If your bowels have not moved by your second day at home, take a laxative of choice. If no bowel movement by the third day, call your surgeon.  If you find you have the opposite problem and your stools are too soft, stop taking the stool softener. Take your medications exactly how prescribed. Dont increase decrease, or stop your medication without your doctors approval.    Tell your doctor if you get any symptoms or side effects such as an upset stomach, vomiting, diarrhea, or skin rash. Do not take any over the counter medication or herbal supplements without consulting your physician. Keep a list of all your medication names, dosages, and instructions. SELENE Hose (White Stockings)  These are used to prevent swelling and usually are worn for 2-4 weeks. They should be worn during the day and taken off at bedtime. Someone other than the patient will need to put on and take off the hose. It is too much pulling and tugging for the patient. Expect them to be difficult to put on and they should feel snug. Wash them by hand to keep their elasticity. Incentive Spirometer:  Continue to use your lung exerciser at least 3 times per day for the next 4 weeks. Support your chest and cough each time you complete the 10 exercises. This will help keep your lungs clear and prevent pneumonia. Awareness of Heart Beating: You may feel your heart is beating stronger or that your heart is beating in your neck and ears. Dont worry this is common especially at bedtime. Smoking:  NEVER SMOKE AGAIN! Absolutely no tobacco products. Do not allow others to smoke around you. Second hand smoke can be just as bad. Smoking negatively affects the healing process, circulation, and increases your risk for blood clots. The [address] [place] [address] has a free program to help people quit smoking. Call 7-810.273.3799. Heart Valve Replacement:  If you had your heart valve replaced, you will receive a card in the mail to keep in your wallet. Show ALL your doctors and dentist the card before any treatments. You will need to take antibiotics before and after surgery, teeth cleaning, etc. for the rest of your life.   If you get a sore throat or fever over 101º that lasts a day or two call your doctor. If you are exposed to someone with strep throat, then get a sore throat yourself, call your doctor IMMEDIATELY. Any doubts about taking antibiotics before or after a procedure call your surgeons office. If taking Coumadin monitor dietary restrictions as provided by the dietician. What problems do you need to look out for after you leave the hospital?   Call your surgeon if you have any of these symptoms. [phone]  If you have sudden, severe shortness of breath or shortness of breath while resting. Pain, tenderness, warmth or redness in your calf. For redness, warmth, tenderness, drainage or swelling of any incision. For ANY chest incision drainage. Temperature (taken daily) greater than 101º. If your heart rate is below 50 or over 110 beats per minute, or symptoms of fluttering in your chest or irregular pulse. Weight gain of 3 pounds in one day or 5 pounds in one week. If bowels have not moved by third day at home. Persistent diarrhea, nausea or vomiting. If you are unable to eat, or unable to drink 5 glasses of fluid a day for more than one day. If you have pain not relieved by pain medication. If you experience the same pain or other symptoms that brought you to the hospital or doctor before surgery. Diabetics: Call Primary Care Physician for blood sugars consistently above 130.   Depression: Call Primary Care Physician if feelings of depression persist.    If you think something is seriously wrong go to the nearest emergency room OR Call 911 for any EMERGENCY and go to the nearest hospital!

## 2022-08-05 NOTE — PROGRESS NOTES
08/05/22 1000   RT Protocol   History Pulmonary Disease 1   Respiratory pattern 2   Breath sounds 2   Cough 2   Bronchodilator Assessment Score 7

## 2022-08-05 NOTE — PROGRESS NOTES
Cardiothoracic Surgery Progress Note    CC:  [place], Severe 3V CAD, HTN, HLD    S/P: URGENT CABG CORONARY ARTERY BYPASS X 3/DEJAH  EXCISION OF PRESTERNAL SEBACEOUS CYST    POD# 4    Subjective:  Hemodynamically stable, RA, afebrile. Alert and oriented X 3. Weight 99.4kg  this am.     WT:98.9 kg/102.7 kg   pre-op 95.8 kg    Vital Signs:   /72   Pulse 90   Temp 97.8 °F (36.6 °C) (Temporal)   Resp 18   Ht 6' 1\" (1.854 m)   Wt 219 lb 2 oz (99.4 kg)   SpO2 96%   BMI 28.91 kg/m²     IJ    Physical Exam:   Cardiac: regular, no murmur, NSR  Lungs: clear to auscultation, decreased bases bilaterally  Abdomen:  + BM x 3  Vascular:  pulses all palpable   Extremities: generalized, non-pitting edema 1+  :  UOP 1700/300/240   Lasix 20 IV BID  Chest Tube(s) & Incision(s):    Sternum: stable,C/D/I   Edges approximated, no drainage  Chest tube site: C/D/I  Purse string intact   Left  leg incision:   C/D/I  Edges well approximated, no drainage   Prevena Dressing w/ scant ss drainage     Labs:   CBC:   Recent Labs     08/04/22  0416 08/05/22  0302   WBC 8.8 7.1   HGB 10.0* 9.3*   HCT 29.4* 27.2*   * 156     BMP:   Recent Labs     08/03/22  0415 08/04/22  0416 08/04/22  0906 08/04/22  1545 08/05/22  0302   K 4.0 4.3   < > 4.5 4.1   CREATININE 1.0 0.9  --   --  0.9   CALCIUM 8.3 8.2*  --   --  8.2*   MG 1.70*  --   --   --   --     < > = values in this interval not displayed. PT/INR:   Recent Labs     08/03/22 0415   PROTIME 16.4*   INR 1.33*       Assessment/Plan:  As per CC:     Plan:   CABG X 3/ DEJAH/removal of sebaceous cyst  Aggressive IS n- EZPAP  Diuresis - strict I/O, fluid restriction   Off O2  Laxative of choice   Ambulate  Meds BB/ASA/ACE tomorrow  Labs:WBC 7.1, H/H 9/27, K+ 4.1, Creat 0.9, Ca++ 8.2  Cont Lasix at D/C  Awaiting input from Dr. Iesha Poe for substitute for statin    Disp:  Plan for home with home care  today or tomorrow.       Electronically signed by AZALIA White CNP on 8/5/2022 at 9:43 AM

## 2022-08-05 NOTE — CARE COORDINATION
BARBRA called Uri Fowler with [facility] in [address] [facility] and she is aware of pt discharging today. CM took phone in room and she asked pt what time is good on Monday for the nurse to come and see him and he does not have a preference. She told him will send nurse out between 9-10am.     CM faxed cristofer/avs to her at [phone].     Patient discharged 8/5/2022 to [facility]   All discharge needs met per case management     Trena Ortiz RN, BSN  [phone]

## 2022-08-08 ENCOUNTER — CARE COORDINATION (OUTPATIENT)
Dept: CASE MANAGEMENT | Age: 52
End: 2022-08-08

## 2022-08-08 DIAGNOSIS — E78.2 MIXED HYPERLIPIDEMIA: Primary | ICD-10-CM

## 2022-08-08 NOTE — CARE COORDINATION
This  spoke with pt and pt stated that he is doing well. Patient denied any worsening symptoms. Denied fever, chills, N/V and any difficulty breathing at this time. Denied chest pain and SOB. Denied difficulty with urination, BMs or appetite. Medication review performed and patient verbalized understanding and is taking all medications as prescribed. Pt has a f/u with surgeon on 22. HC has been out and services started per pt. Writer contacted [facility] for, Janak Tucker, in intake. Denied any needs and concerns at this time. Advised pt to immediately report any worsening symptoms to the PCP. Patient verbalized understanding and agreed. Ayleen Delgado LPN,   PH: [address] Transitions Initial Follow Up Call    Call within 2 business days of discharge: Yes    Patient: Sadie Yen Sr. Patient : 1970   MRN: [id number]  Reason for Admission: Chest pain  Discharge Date: 22 RARS: Readmission Risk Score: 11      Last Discharge [facility]       Date Complaint Diagnosis Description Type Department Provider    22 Chest Pain Chest discomfort . .. [facility]; Mckenzie Epps . .. Spoke with: Liana Adamson. Facility: [facility]    Non-face-to-face services provided:  Obtained and reviewed discharge summary and/or continuity of care documents  Transitions of Care Initial Call    Was this an external facility discharge? No Discharge Facility: [facility]    Challenges to be reviewed by the provider   Additional needs identified to be addressed with provider: Yes  home health care-[facility].             Method of communication with provider : phone    Advance Care Planning:   Does patient have an Advance Directive: reviewed and current. LPN Care Coordinator contacted the patient by telephone to perform post hospital discharge assessment. Verified name and  with patient as identifiers.  Provided introduction to self, and explanation of the LPN CC role. LPN CC reviewed discharge instructions, medical action plan and red flags with patient who verbalized understanding. Patient given an opportunity to ask questions and does not have any further questions or concerns at this time. Were discharge instructions available to patient? Yes. Reviewed appropriate site of care based on symptoms and resources available to patient including: PCP  Specialist  Urgent care clinics  [facility]  When to call 911. The patient agrees to contact the PCP office for questions related to their healthcare. Medication reconciliation was performed with patient, who verbalizes understanding of administration of home medications. Advised obtaining a 90-day supply of all daily and as-needed medications. Was patient discharged with a pulse oximeter? no    LPN CC provided contact information. No further follow-up call indicated based on severity of symptoms and risk factors.   Plan for next call:  N/A              Care Transitions 24 Hour Call    Schedule Follow Up Appointment with PCP: Completed  Do you have a copy of your discharge instructions?: Yes  Do you have all of your prescriptions and are they filled?: Yes  Have you been contacted by a [facility] Pharmacist?: No  Have you scheduled your follow up appointment?: Yes  How are you going to get to your appointment?: Car - family or friend to transport  Do you have support at home?: Alone  Do you feel like you have everything you need to keep you well at home?: Yes  Are you an active caregiver in your home?: No  Care Transitions Interventions   Home Care Waiver: Completed Physical Therapy: Completed     Occupational Therapy: Completed              Follow Up  Future Appointments   Date Time Provider Sander Sneed   8/18/2022 10:30 AM MD Wanda Johnson S SONNY   9/6/2022 11:00 AM AZALIA Lawrence - CNP FF Cardio SONNY Flores LPN

## 2022-08-08 NOTE — DISCHARGE SUMMARY
Discharge Summary    Patient:  Nichole Bae Sr. 1970 [id number]   Admission Date:  7/29/2022  7:51 AM  Discharge Date:  8/8/2022    Principle Diagnosis:  Chest pain    Secondary Diagnosis:  Principal Problem:    Chest pain  Active Problems:    Chest discomfort  Resolved Problems:    * No resolved hospital problems. *      Consults:  IP CONSULT TO CARDIOLOGY  IP CONSULT TO CARDIOTHORACIC SURGERY  IP CONSULT TO CARDIAC REHAB  IP CONSULT TO DIETITIAN  IP CONSULT TO HOME CARE NEEDS    Angiogram:   LM 50% distal, eccentric   LAD 70% prox, patent mid stents, 60% mid   Cx OM1 70% ostial, OM3 100% with L-L collaterals   RI 50% mid   RCA 95% prox, 40% instent prox, 50% mid   LVEDP 18   LVG 65%       Procedure:    Procedure(s) with comments:  URGENT CABG CORONARY ARTERY BYPASS X 3, LIMA GRAFT TO DISTAL LAD, VEIN GRAFT TO RCA, VEIN GRAFT TO OM1, LIGATION DEJAH WITH 40MM ATRICLIP, EVH LEFT LEG, EXCISION OF PRESTERNAL SEBACEOUS CYST, TYLER, TOTAL CPB, INSERTION OF TEMPORARY VENTRICULAR PACING WIRES, DOPPLER BYPASS GRAFT FLOW VERIFICATION, 5 LEVEL BILATERAL INTERCOSTAL NERVE BLOCK USING EXPAREL AND MARCAINE - LEFT LEG INCISION AT 0726, CHEST INCISION     History:  The patient is a 47 y.o. male  with significant past medical history of HTN, CAD w/ coronary angioplasty with 4x stents (2010 and 2016), and Mitral incompetence, GERD, Peptic Ulcer disease, Gracia's Esophagus, Plaque Psoriasis, neck surgery, ankle surgery, and appendectomy who presented to the Emergency Department on 7/29/22 with intermittent left sided chest pain that radiated to his left neck and shoulder. This pain was alleviated with rest.  Endorses shortness of breath with exertion. Patient saw Dr. Kenn Cardenas the day prior to admission in the office and was instructed to go to the emergency department if symptoms continued for evaluation and heart cath. Left heart angiogram found severe multivessel coronary artery disease.  CVTS was consulted for surgical evaluation for coronary artery bypass grafting. Patient is currently hemodynamically stable and denying any complaints of chest pain or shortness of breath. Patient is a current smoker. Patient has not been vaccinated for covid. Hospital Course: The patient underwent  CABG X 3/DEJAH on 8/1/22 . The patient's post-operative course was uneventful . Pain was controlled with combination of oral and IV medications. Patient was able to ambulate without difficulty and tolerate a regular diet. The patient was discharged on 8/5/2022 . Patient might neerd to be started on DAPT with baby ASA and Plavix due to his young age and quality of targets and his statin intolerance. This will be discussed at his first post op visit. Pathology:    Pre-sternal sebaceous cyst, excision:      - Epidermal inclusion cyst.     Disposition:  home    Condition:  good    Medications:  ACE:hypotension  Aspirin: 325 mg daily   Betablocker:25 mg BID  Statin:Zeita 10 mg nightly unable to take statins  Diuretic: Lasix 20 mg daily X 7 days       Discharge Medications:  Discharge Medication List as of 8/5/2022  3:43 PM             Details   oxyCODONE (ROXICODONE) 5 MG immediate release tablet Take 1 tablet by mouth every 4 hours as needed for Pain (incisional) for up to 7 days. , Disp-28 tablet, R-0Print      mometasone-formoterol (DULERA) 200-5 MCG/ACT inhaler Inhale 2 puffs into the lungs in the morning and 2 puffs in the evening., Disp-1 each, R-2Normal      sennosides-docusate sodium (SENOKOT-S) 8.6-50 MG tablet Take 1 tablet by mouth in the morning and 1 tablet before bedtime. , Disp-30 tablet, R-0Normal      methocarbamol (ROBAXIN) 500 MG tablet Take 1 tablet by mouth in the morning and 1 tablet at noon and 1 tablet in the evening and 1 tablet before bedtime. Do all this for 7 days. , Disp-28 tablet, R-0Normal      potassium chloride (KLOR-CON M) 20 MEQ TBCR extended release tablet Take 1 tablet by mouth in the morning for 7 days. , Disp-7 tablet, R-0Normal      furosemide (LASIX) 20 MG tablet Take 1 tablet by mouth in the morning for 7 days. , Disp-7 tablet, R-0Normal      ezetimibe (ZETIA) 10 MG tablet Take 1 tablet by mouth in the morning., Disp-30 tablet, R-3Normal                Details   aspirin 325 MG EC tablet Take 1 tablet by mouth in the morning., Disp-30 tablet, R-3Normal      metoprolol tartrate (LOPRESSOR) 25 MG tablet Take 1 tablet by mouth in the morning and 1 tablet before bedtime. , Disp-60 tablet, R-3Normal                Details   pantoprazole (PROTONIX) 40 MG tablet Take 40 mg by mouth dailyHistorical Med      clonazePAM (KLONOPIN) 0.5 MG tablet Take 0.5 mg by mouth 2 times daily as needed. Historical Med      Acetylcysteine (NAC PO) Take by mouthHistorical Med              Labs:  Renal:   Lab Results   Component Value Date/Time     08/05/2022 03:02 AM    K 4.1 08/05/2022 03:02 AM    K 3.9 07/30/2022 05:38 AM    CL 95 08/05/2022 03:02 AM    CO2 30 08/05/2022 03:02 AM    BUN 16 08/05/2022 03:02 AM    CREATININE 0.9 08/05/2022 03:02 AM     Heme:   Lab Results   Component Value Date/Time    WBC 7.1 08/05/2022 03:02 AM    HGB 9.3 08/05/2022 03:02 AM    HCT 27.2 08/05/2022 03:02 AM    MCV 91.9 08/05/2022 03:02 AM     08/05/2022 03:02 AM     HgA1C:   Lab Results   Component Value Date/Time    LABA1C 5.5 07/29/2022 02:35 PM       Pre-Op WT:   95.8 kg  Discharge WT:  99.4kg      Patient Instructions: See AVS for full list of discharge instructions given to patient  Activity: DO NOT LIFT, PUSH, OR PULL ANYTHING OVER 5 POUNDS FOR 8 WEEK from the day of surgery  Diet:  cardiac diet  Wound Care:  [address] YOUR INCISIONS DAILY WITH A CLEAN WASHCLOTH AND ANTIBACTERIAL SOAP. Do not wash your incisions after you have cleansed other parts of your body.     Follow-up Appointments:   Cardiothoracic Surgeon, Dr. Nell Davalos  Cardiology CNP       Electronically signed by AZALIA Fernandez CNP on 8/8/2022 at 8:46 AM

## 2022-08-15 ASSESSMENT — ENCOUNTER SYMPTOMS
COUGH: 0
NAUSEA: 0
ABDOMINAL PAIN: 0
RHINORRHEA: 0
SHORTNESS OF BREATH: 0
WHEEZING: 0
VOMITING: 0
DIARRHEA: 0

## 2022-08-18 ENCOUNTER — OFFICE VISIT (OUTPATIENT)
Dept: CARDIOTHORACIC SURGERY | Age: 52
End: 2022-08-18

## 2022-08-18 VITALS
OXYGEN SATURATION: 96 % | HEIGHT: 73 IN | TEMPERATURE: 98 F | WEIGHT: 222.8 LBS | SYSTOLIC BLOOD PRESSURE: 124 MMHG | DIASTOLIC BLOOD PRESSURE: 72 MMHG | BODY MASS INDEX: 29.53 KG/M2 | HEART RATE: 76 BPM

## 2022-08-18 DIAGNOSIS — Z87.2 HISTORY OF SEBACEOUS CYST: ICD-10-CM

## 2022-08-18 DIAGNOSIS — Z98.890 S/P LEFT ATRIAL APPENDAGE LIGATION: ICD-10-CM

## 2022-08-18 DIAGNOSIS — Z95.1 S/P CABG X 3: Primary | ICD-10-CM

## 2022-08-18 PROCEDURE — 99024 POSTOP FOLLOW-UP VISIT: CPT

## 2022-08-18 NOTE — PROGRESS NOTES
Progress Note    CC:  1st Postoperative follow-up    S/P  CABGx3, DEJAH, excision of presternal sebaceous cyst on 8/1/22; DC'd on 8/5/22. Subjective:   Patient states that he is feeling well since he left the hospital.  Patient required refill of pain medications last week but reports his pain is now under control without the need for narcotics. He has been walking multiple times a day without issue. Reports scabbing on a small area of his sternotomy incision. He has completely quit smoking since being discharged from the hospital.  Denies any drainage, fevers, or chills. He denies any exertional chest pain, exertional shortness of breath, syncope, presyncope, palpitations, cough, hemoptysis, dyspnea, orthopnea, paroxysmal nocturnal dyspnea, or any other constitutional symptoms. Vital Signs:                                                 /72 (Site: Left Upper Arm, Position: Sitting)   Pulse 76   Temp 98 °F (36.7 °C) (Infrared)   Ht 6' 1\" (1.854 m)   Wt 222 lb 12.8 oz (101.1 kg)   SpO2 96%   BMI 29.39 kg/m²        CV:   Regular rate and rhythm with no rubs or murmurs. Pulm: Clear lung fields with no rales or rhonchi. Incisions:  Sternotomy incision is well healed with the exception of a small area of eschar that has formed at the presternal sebaceous cyst excision site. Previous chest tube site C/D/I. Sternum is stable to deep palpation. Abd:  Soft  Ext: Left leg incision is clean, dry and intact. Small hematoma near [facility] site. Nontender to palpation. Trace peripheral edema. Assessment/Plan:  Overall doing very well post . Small area of eschar has formed around the sternal sebaceous cyst excision site on his sternotomy incision. There are no signs of infection and the patient denies any drainage, fevers, or chills. Will send a referral to wound care center at [facility] for possible escharotomy and local wound care to facilitate healing.    Will discuss possibility of starting dual antiplatelet therapy due to patients young age and statin intolerance at the second post-op visit after his escharotomy. We discussed secondary risk prevention for cardiovascular disease. I gave the patient a copy of our protocol for the secondary risk prevention of cardiovascular disease. The patient may increase activities as his feels comfortable doing so. I stressed to him to follow religiously the sternal precautions for the next 6 weeks. Follow-up with Cardiology, Wound Care, PCP as prescribed. Follow-up: Return in about 4 weeks (around 9/15/2022). Rohit Haskins PA-C  8/18/2022  11:46 AM   Attending Supervising Physicians Attestation Statement  The patient met the criteria for direct supervision. I saw and examined the patient and discussed the findings and plans with the physician assistant and agree as documented in his note .

## 2022-09-06 ENCOUNTER — OFFICE VISIT (OUTPATIENT)
Dept: CARDIOLOGY CLINIC | Age: 52
End: 2022-09-06
Payer: COMMERCIAL

## 2022-09-06 VITALS
WEIGHT: 224.7 LBS | DIASTOLIC BLOOD PRESSURE: 72 MMHG | OXYGEN SATURATION: 97 % | HEART RATE: 68 BPM | BODY MASS INDEX: 29.78 KG/M2 | SYSTOLIC BLOOD PRESSURE: 114 MMHG | HEIGHT: 73 IN

## 2022-09-06 DIAGNOSIS — I10 PRIMARY HYPERTENSION: ICD-10-CM

## 2022-09-06 DIAGNOSIS — I25.10 CORONARY ARTERY DISEASE DUE TO LIPID RICH PLAQUE: Primary | ICD-10-CM

## 2022-09-06 DIAGNOSIS — I25.83 CORONARY ARTERY DISEASE DUE TO LIPID RICH PLAQUE: Primary | ICD-10-CM

## 2022-09-06 DIAGNOSIS — E78.2 MIXED HYPERLIPIDEMIA: ICD-10-CM

## 2022-09-06 PROCEDURE — 99214 OFFICE O/P EST MOD 30 MIN: CPT | Performed by: NURSE PRACTITIONER

## 2022-09-06 NOTE — PROGRESS NOTES
[address]     Outpatient Follow Up Note    CHIEF COMPLAINT / HPI: Hospital Follow Up secondary to coronary artery disease and status post CABG    Hospital record has been reviewed  Hospital Course progressed as follows per discharge summary:     47 y.o. male  with significant past medical history of HTN, CAD w/ coronary angioplasty with 4x stents (2010 and 2016), and Mitral incompetence, GERD, Peptic Ulcer disease, Gracia's Esophagus, Plaque Psoriasis, neck surgery, ankle surgery, and appendectomy   ~presented to the Emergency Department on 7/29/22 with intermittent left sided chest pain that radiated to his left neck and shoulder. This pain was alleviated with rest.  Endorses shortness of breath with exertion. Patient saw Dr. Tha Wong the day prior to admission in the office and was instructed to go to the emergency department if symptoms continued for evaluation and heart cath.   ~Left heart angiogram found severe multivessel coronary artery disease. CVTS was consulted for surgical evaluation for coronary artery bypass grafting. Hospital Course: The patient underwent  CABG X 3/DEJAH on 8/1/22 . The patient's post-operative course was uneventful . Patient was able to ambulate without difficulty and tolerate a regular diet. The patient was discharged on 8/5/2022 . Patient might neerd to be started on DAPT with baby ASA and Plavix due to his young age and quality of targets and his statin intolerance. This will be discussed at his first post op visit. Eveline Crouch is 47 y.o. male who presents today for a routine follow up after a recent hospitalization related to the above mentioned issues. He recalls having had CP. Two weeks earlier, he'd gone to the ER with c/o chest burning. Subjective:   Since the time of discharge, the patient admits their symptoms have improved. He denies significant chest pain. He has inc discomfort. He gets a pinching sensation under his ribs.  There is no SOB/RESENDEZ. The patient is experiencing a little palpitations noticed only when he smoke marijuana (which he does every evening to help him sleep). His wt is stable. His BP runs ~ 112/76. Once he felt dizzy and checked his BP (141/86). He broke his toe since getting home. These symptoms are improving over the last few weeks. With regard to medication therapy the patient has been compliant with prescribed regimen. They have tolerated therapy to date. Past Medical History:   Diagnosis Date    Hypertension     MI (mitral incompetence)      Social History:    Social History     Tobacco Use   Smoking Status Former    Packs/day: 0.25    Types: Cigarettes    Quit date: 2022    Years since quittin.1   Smokeless Tobacco Never   Tobacco Comments    1 ppd     Current Medications:  Current Outpatient Medications   Medication Sig Dispense Refill    aspirin 325 MG EC tablet Take 1 tablet by mouth in the morning. 30 tablet 3    mometasone-formoterol (DULERA) 200-5 MCG/ACT inhaler Inhale 2 puffs into the lungs in the morning and 2 puffs in the evening. 1 each 2    metoprolol tartrate (LOPRESSOR) 25 MG tablet Take 1 tablet by mouth in the morning and 1 tablet before bedtime. 60 tablet 3    sennosides-docusate sodium (SENOKOT-S) 8.6-50 MG tablet Take 1 tablet by mouth in the morning and 1 tablet before bedtime. 30 tablet 0    ezetimibe (ZETIA) 10 MG tablet Take 1 tablet by mouth in the morning. 30 tablet 3    pantoprazole (PROTONIX) 40 MG tablet Take 40 mg by mouth daily      clonazePAM (KLONOPIN) 0.5 MG tablet Take 0.5 mg by mouth 2 times daily as needed. potassium chloride (KLOR-CON M) 20 MEQ TBCR extended release tablet Take 1 tablet by mouth in the morning for 7 days. 7 tablet 0    furosemide (LASIX) 20 MG tablet Take 1 tablet by mouth in the morning for 7 days. 7 tablet 0     No current facility-administered medications for this visit.      REVIEW OF SYSTEMS:   CONSTITUTIONAL: No major weight gain or carotid upstroke is normal in amplitude and contour without delay or bruit    ABDOMEN:  Normal bowel sounds, non-distended and non-tender to palpation   EXT: No edema, no calf tenderness. Pulses are present bilaterally. DATA:    Lab Results   Component Value Date    ALT 38 07/29/2022    AST 22 07/29/2022    ALKPHOS 104 07/29/2022    BILITOT 0.6 07/29/2022     Lab Results   Component Value Date    CREATININE 0.9 08/05/2022    BUN 16 08/05/2022     (L) 08/05/2022    K 4.1 08/05/2022    CL 95 (L) 08/05/2022    CO2 30 08/05/2022     No results found for: TSH, M5PDEMY, Y1JHIVO, THYROIDAB  Lab Results   Component Value Date    WBC 7.1 08/05/2022    HGB 9.3 (L) 08/05/2022    HCT 27.2 (L) 08/05/2022    MCV 91.9 08/05/2022     08/05/2022     No components found for: CHLPL  Lab Results   Component Value Date    TRIG 479 (H) 07/29/2022    TRIG 256 (H) 11/01/2016    TRIG 1,026 (H) 06/23/2012     Lab Results   Component Value Date    HDL 18 (L) 07/29/2022    HDL 22 (L) 11/01/2016    HDL 25 (L) 06/23/2012     Lab Results   Component Value Date    LDLCALC see below 07/29/2022    LDLCALC 107 (H) 11/01/2016     Lab Results   Component Value Date    LABVLDL see below 07/29/2022    LABVLDL 51 11/01/2016     Radiology Review:  Pertinent images / reports were reviewed as a part of this visit and reveals the following:    Angiogram: 7/29/22:  Artery Findings/Result   LM 50% distal, eccentric   LAD 70% prox, patent mid stents, 60% mid   Cx OM1 70% ostial, OM3 100% with L-L collaterals   RI 50% mid   RCA 95% prox, 40% instent prox, 50% mid   LVEDP 18   LVG 65%   Post Cath Dx:       Admit to hospital  CABG - consider LAD, OM1, OM3, PDA +/- ramus although appears small    Carotid US : 7/30/22:   No hemodynamically significant stenosis noted in the internal carotid artery    bilaterally.         OR: 8/1/22: CABG CORONARY ARTERY BYPASS X 3 (LIMA GRAFT TO DISTAL LAD, VEIN GRAFT TO RCA, VEIN GRAFT TO OM1)   LIGATION DEJAH WITH 40MM ATRICLIP    Assessment:      Diagnosis Orders   1. Coronary artery disease due to lipid rich plaque   ~s/p CABG 8/1/22 with DEJAH clip  ~progressing : walking 3 x / week. Plans for CR II  ~c/o surgical discomfort   ~hx PTCA LAD Sept with staged RCA Oct '10  ~s/p PTCA mid-LAD & diag-1 Oct '16  ~states did not take his disease / meds seriously  External Referral To Cardiac Rehab      2. Mixed hyperlipidemia   ~trig elevated / HDL low ; last A1c 5.5%   ~historically intolerant to statins and refused [facility]  ~tolerating zetia to date       3. Primary hypertension   ~controlled on metoprolol 25 mg bid           Patient  is stable since hospital discharge. Plan:  CBC/CMP with lipid profile as routine   Cardiac rehab II ([facility] insurance dictated; if cannot offer monitoring then will refer to [facility])   F/U in 4-6 weeks    I have addressed the patient's cardiac risk factors and adjusted pharmacologic treatment as needed. In addition, I have reinforced the need for patient directed risk factor modification. Further evaluation will be based upon the patient's clinical course and testing results. All questions and concerns were addressed to the patient. Alternatives to  treatment were discussed. The patient  currently  is not smoking cigarettes but marijuana at . The risks related to smoking were reviewed with the patient. Recommend maintaining a smoke-free lifestyle. Antiplatelet therapy has been recommended / prescribed for this patient. Education conducted on adverse reactions including bleeding was discussed. The patient verbalizes understanding. Pt is on a BB  Pt is not on an ace-i/ARB : neg CHF  Pt is on a statin      Saturated fat diet discussed  Exercise program discussed : walks around [facility] 3 x / week    Thank you for allowing to us to participate in the care of Virgen Denton. .      [address]  Documentation of today's visit sent to PCP

## 2022-09-15 ENCOUNTER — OFFICE VISIT (OUTPATIENT)
Dept: CARDIOTHORACIC SURGERY | Age: 52
End: 2022-09-15

## 2022-09-15 VITALS
DIASTOLIC BLOOD PRESSURE: 80 MMHG | TEMPERATURE: 98 F | HEART RATE: 56 BPM | SYSTOLIC BLOOD PRESSURE: 144 MMHG | WEIGHT: 227.2 LBS | HEIGHT: 73 IN | OXYGEN SATURATION: 98 % | BODY MASS INDEX: 30.11 KG/M2

## 2022-09-15 DIAGNOSIS — Z98.890 S/P LEFT ATRIAL APPENDAGE LIGATION: ICD-10-CM

## 2022-09-15 DIAGNOSIS — Z95.1 S/P CABG X 3: Primary | ICD-10-CM

## 2022-09-15 PROCEDURE — 99024 POSTOP FOLLOW-UP VISIT: CPT

## 2022-09-15 RX ORDER — CLOPIDOGREL BISULFATE 75 MG/1
75 TABLET ORAL DAILY
Qty: 90 TABLET | Refills: 1 | Status: ON HOLD | OUTPATIENT
Start: 2022-09-15 | End: 2022-09-27 | Stop reason: HOSPADM

## 2022-09-15 RX ORDER — ASPIRIN 81 MG/1
81 TABLET ORAL DAILY
Qty: 90 TABLET | Refills: 1 | Status: SHIPPED | OUTPATIENT
Start: 2022-09-15

## 2022-09-15 NOTE — PROGRESS NOTES
Progress Note    CC:  2nd Postoperative follow-up    S/P  CABGx3, DEJAH, excision of presternal sebaceous cyst on 8/1/22; DC'd on 8/5/22. Subjective:   Patient states that he is feeling well since his last office visit. He reports that his activity has slowed down because he recently broke one of his left toes. Patient reports that his incisional pain has been well controlled. Patient did not receive his keflex that was prescribed due to the eschar on his sternotomy incision. A referral was sent to the wound care clinic but patient did not go due to the eschar falling off on its own. He reports that the sternotomy incision has healed well. He has completely quit smoking cigarettes since being discharged from the hospital. He does state that he started smoking some marijuana at night to help with sleeping and noticed that it makes his heart \"beat stronger\". Instructed patient to abstain from both cigarette smoking and marijuana smoking. He denies any exertional chest pain, exertional shortness of breath, syncope, presyncope, palpitations, cough, hemoptysis, dyspnea, orthopnea, paroxysmal nocturnal dyspnea, drainage, fevers, chills, or any other constitutional symptoms. Vital Signs:                                                 BP (!) 144/80 (Site: Left Upper Arm, Position: Sitting)   Pulse 56   Temp 98 °F (36.7 °C) (Infrared)   Ht 6' 1\" (1.854 m)   Wt 227 lb 3.2 oz (103.1 kg)   SpO2 98%   BMI 29.98 kg/m²        CV:   Regular rate and rhythm with no rubs or murmurs. Pulm: Clear lung fields with no rales or rhonchi. Incisions:  Sternotomy incision C/D/I. Previous area of eschar is well healed and well approximated. Previous chest tube site C/D/I. Sternum is stable to deep palpation. Abd:  Soft  Ext: Left leg incision is clean, dry and intact. No edema.      Assessment/Plan:  Overall doing very well post.  Will start patient on dual antiplatelet therapy with Plavix and 81mg ASA due to patients young age and statin intolerance. Smoking cessation counseling provided. The patient may increase activities as his feels comfortable doing so. I stressed to him to follow religiously the sternal precautions until 8 weeks post-op on 10/1/22. From a surgical standpoint the patient is ready for cardiac rehab with a 5lb weight limit. As the patient is healing well and has excellent medical follow we will see him back on a PRN basis. I encouraged him to call with any new questions or concerns. Follow-up with Cardiology, Wound Care, PCP as prescribed. Follow-up: Return if symptoms worsen or fail to improve. Rohit Haskins PA-C  9/15/2022  4:02 PM     Attending Supervising [de-identified] Attestation Statement  The patient met the criteria for direct supervision. I saw and examined the patient and discussed the findings and plans with the physician assistant and agree as documented in his note .

## 2022-09-16 RX ORDER — AMLODIPINE BESYLATE 5 MG/1
TABLET ORAL
Qty: 90 TABLET | Refills: 3 | OUTPATIENT
Start: 2022-09-16

## 2022-09-25 ENCOUNTER — APPOINTMENT (OUTPATIENT)
Dept: GENERAL RADIOLOGY | Age: 52
DRG: 247 | End: 2022-09-25
Payer: COMMERCIAL

## 2022-09-25 ENCOUNTER — HOSPITAL ENCOUNTER (INPATIENT)
Age: 52
LOS: 1 days | Discharge: HOME | DRG: 247 | End: 2022-09-27
Attending: EMERGENCY MEDICINE | Admitting: INTERNAL MEDICINE
Payer: COMMERCIAL

## 2022-09-25 ENCOUNTER — APPOINTMENT (OUTPATIENT)
Dept: CT IMAGING | Age: 52
DRG: 247 | End: 2022-09-25
Payer: COMMERCIAL

## 2022-09-25 DIAGNOSIS — R07.9 CHEST PAIN, UNSPECIFIED TYPE: Primary | ICD-10-CM

## 2022-09-25 PROBLEM — R77.8 ELEVATED TROPONIN: Status: ACTIVE | Noted: 2022-09-25

## 2022-09-25 PROBLEM — R79.89 ELEVATED TROPONIN: Status: ACTIVE | Noted: 2022-09-25

## 2022-09-25 LAB
A/G RATIO: 1.5 (ref 1.1–2.2)
ALBUMIN SERPL-MCNC: 4.6 G/DL (ref 3.4–5)
ALP BLD-CCNC: 131 U/L (ref 40–129)
ALT SERPL-CCNC: 28 U/L (ref 10–40)
ANION GAP SERPL CALCULATED.3IONS-SCNC: 11 MMOL/L (ref 3–16)
AST SERPL-CCNC: 20 U/L (ref 15–37)
BASOPHILS ABSOLUTE: 0.1 K/UL (ref 0–0.2)
BASOPHILS RELATIVE PERCENT: 1 %
BILIRUB SERPL-MCNC: 0.6 MG/DL (ref 0–1)
BUN BLDV-MCNC: 13 MG/DL (ref 7–20)
CALCIUM SERPL-MCNC: 9.8 MG/DL (ref 8.3–10.6)
CHLORIDE BLD-SCNC: 101 MMOL/L (ref 99–110)
CO2: 25 MMOL/L (ref 21–32)
CREAT SERPL-MCNC: 1 MG/DL (ref 0.9–1.3)
EKG ATRIAL RATE: 71 BPM
EKG DIAGNOSIS: NORMAL
EKG P AXIS: 39 DEGREES
EKG P-R INTERVAL: 188 MS
EKG Q-T INTERVAL: 386 MS
EKG QRS DURATION: 80 MS
EKG QTC CALCULATION (BAZETT): 419 MS
EKG R AXIS: 20 DEGREES
EKG T AXIS: 19 DEGREES
EKG VENTRICULAR RATE: 71 BPM
EOSINOPHILS ABSOLUTE: 0.2 K/UL (ref 0–0.6)
EOSINOPHILS RELATIVE PERCENT: 3.4 %
GFR AFRICAN AMERICAN: >60
GFR NON-AFRICAN AMERICAN: >60
GLUCOSE BLD-MCNC: 109 MG/DL (ref 70–99)
HCT VFR BLD CALC: 45.4 % (ref 40.5–52.5)
HEMOGLOBIN: 14.9 G/DL (ref 13.5–17.5)
LIPASE: 26 U/L (ref 13–60)
LYMPHOCYTES ABSOLUTE: 1.4 K/UL (ref 1–5.1)
LYMPHOCYTES RELATIVE PERCENT: 25.6 %
MCH RBC QN AUTO: 28.3 PG (ref 26–34)
MCHC RBC AUTO-ENTMCNC: 32.9 G/DL (ref 31–36)
MCV RBC AUTO: 85.9 FL (ref 80–100)
MONOCYTES ABSOLUTE: 0.4 K/UL (ref 0–1.3)
MONOCYTES RELATIVE PERCENT: 7.8 %
NEUTROPHILS ABSOLUTE: 3.5 K/UL (ref 1.7–7.7)
NEUTROPHILS RELATIVE PERCENT: 62.2 %
PDW BLD-RTO: 16.2 % (ref 12.4–15.4)
PLATELET # BLD: 232 K/UL (ref 135–450)
PMV BLD AUTO: 7.2 FL (ref 5–10.5)
POC ACT LR: 221 SEC
POC ACT LR: 237 SEC
POTASSIUM SERPL-SCNC: 4.1 MMOL/L (ref 3.5–5.1)
PRO-BNP: 143 PG/ML (ref 0–124)
RBC # BLD: 5.28 M/UL (ref 4.2–5.9)
SODIUM BLD-SCNC: 137 MMOL/L (ref 136–145)
TOTAL PROTEIN: 7.6 G/DL (ref 6.4–8.2)
TROPONIN: 0.02 NG/ML
TROPONIN: 0.04 NG/ML
TROPONIN: 0.14 NG/ML
WBC # BLD: 5.6 K/UL (ref 4–11)

## 2022-09-25 PROCEDURE — 6370000000 HC RX 637 (ALT 250 FOR IP): Performed by: EMERGENCY MEDICINE

## 2022-09-25 PROCEDURE — 80053 COMPREHEN METABOLIC PANEL: CPT

## 2022-09-25 PROCEDURE — 92978 ENDOLUMINL IVUS OCT C 1ST: CPT

## 2022-09-25 PROCEDURE — B241ZZ3 ULTRASONOGRAPHY OF MULTIPLE CORONARY ARTERIES, INTRAVASCULAR: ICD-10-PCS | Performed by: INTERNAL MEDICINE

## 2022-09-25 PROCEDURE — C1874 STENT, COATED/COV W/DEL SYS: HCPCS

## 2022-09-25 PROCEDURE — 99152 MOD SED SAME PHYS/QHP 5/>YRS: CPT | Performed by: INTERNAL MEDICINE

## 2022-09-25 PROCEDURE — 93455 CORONARY ART/GRFT ANGIO S&I: CPT

## 2022-09-25 PROCEDURE — 6360000002 HC RX W HCPCS: Performed by: FAMILY MEDICINE

## 2022-09-25 PROCEDURE — 92929 HC PRQ CARD STENT W/ANGIO ADDL: CPT

## 2022-09-25 PROCEDURE — 027135Z DILATION OF CORONARY ARTERY, TWO ARTERIES WITH TWO DRUG-ELUTING INTRALUMINAL DEVICES, PERCUTANEOUS APPROACH: ICD-10-PCS | Performed by: INTERNAL MEDICINE

## 2022-09-25 PROCEDURE — 83690 ASSAY OF LIPASE: CPT

## 2022-09-25 PROCEDURE — 2580000003 HC RX 258

## 2022-09-25 PROCEDURE — 93005 ELECTROCARDIOGRAM TRACING: CPT | Performed by: INTERNAL MEDICINE

## 2022-09-25 PROCEDURE — 93005 ELECTROCARDIOGRAM TRACING: CPT | Performed by: FAMILY MEDICINE

## 2022-09-25 PROCEDURE — 92928 PRQ TCAT PLMT NTRAC ST 1 LES: CPT

## 2022-09-25 PROCEDURE — 93005 ELECTROCARDIOGRAM TRACING: CPT | Performed by: EMERGENCY MEDICINE

## 2022-09-25 PROCEDURE — 6370000000 HC RX 637 (ALT 250 FOR IP): Performed by: FAMILY MEDICINE

## 2022-09-25 PROCEDURE — 99152 MOD SED SAME PHYS/QHP 5/>YRS: CPT

## 2022-09-25 PROCEDURE — 6360000002 HC RX W HCPCS: Performed by: EMERGENCY MEDICINE

## 2022-09-25 PROCEDURE — C1887 CATHETER, GUIDING: HCPCS

## 2022-09-25 PROCEDURE — 94760 N-INVAS EAR/PLS OXIMETRY 1: CPT

## 2022-09-25 PROCEDURE — 92929 PR PRQ TRLUML CORONARY STENT W/ANGIO ADDL ART/BRNCH: CPT | Performed by: INTERNAL MEDICINE

## 2022-09-25 PROCEDURE — 93459 L HRT ART/GRFT ANGIO: CPT | Performed by: INTERNAL MEDICINE

## 2022-09-25 PROCEDURE — 92941 PRQ TRLML REVSC TOT OCCL AMI: CPT

## 2022-09-25 PROCEDURE — 96376 TX/PRO/DX INJ SAME DRUG ADON: CPT

## 2022-09-25 PROCEDURE — 36415 COLL VENOUS BLD VENIPUNCTURE: CPT

## 2022-09-25 PROCEDURE — 96375 TX/PRO/DX INJ NEW DRUG ADDON: CPT

## 2022-09-25 PROCEDURE — 6370000000 HC RX 637 (ALT 250 FOR IP)

## 2022-09-25 PROCEDURE — 6360000004 HC RX CONTRAST MEDICATION: Performed by: EMERGENCY MEDICINE

## 2022-09-25 PROCEDURE — C1725 CATH, TRANSLUMIN NON-LASER: HCPCS

## 2022-09-25 PROCEDURE — 6360000002 HC RX W HCPCS: Performed by: INTERNAL MEDICINE

## 2022-09-25 PROCEDURE — 74174 CTA ABD&PLVS W/CONTRAST: CPT

## 2022-09-25 PROCEDURE — 2500000003 HC RX 250 WO HCPCS

## 2022-09-25 PROCEDURE — 6360000002 HC RX W HCPCS: Performed by: PHYSICIAN ASSISTANT

## 2022-09-25 PROCEDURE — 6360000004 HC RX CONTRAST MEDICATION: Performed by: INTERNAL MEDICINE

## 2022-09-25 PROCEDURE — G0378 HOSPITAL OBSERVATION PER HR: HCPCS

## 2022-09-25 PROCEDURE — 2000000000 HC ICU R&B

## 2022-09-25 PROCEDURE — C1769 GUIDE WIRE: HCPCS

## 2022-09-25 PROCEDURE — 96374 THER/PROPH/DIAG INJ IV PUSH: CPT

## 2022-09-25 PROCEDURE — 2709999900 HC NON-CHARGEABLE SUPPLY

## 2022-09-25 PROCEDURE — 93010 ELECTROCARDIOGRAM REPORT: CPT | Performed by: INTERNAL MEDICINE

## 2022-09-25 PROCEDURE — 85347 COAGULATION TIME ACTIVATED: CPT

## 2022-09-25 PROCEDURE — 83880 ASSAY OF NATRIURETIC PEPTIDE: CPT

## 2022-09-25 PROCEDURE — 92941 PRQ TRLML REVSC TOT OCCL AMI: CPT | Performed by: INTERNAL MEDICINE

## 2022-09-25 PROCEDURE — 6360000002 HC RX W HCPCS

## 2022-09-25 PROCEDURE — 85025 COMPLETE CBC W/AUTO DIFF WBC: CPT

## 2022-09-25 PROCEDURE — 99285 EMERGENCY DEPT VISIT HI MDM: CPT

## 2022-09-25 PROCEDURE — 71045 X-RAY EXAM CHEST 1 VIEW: CPT

## 2022-09-25 PROCEDURE — B2111ZZ FLUOROSCOPY OF MULTIPLE CORONARY ARTERIES USING LOW OSMOLAR CONTRAST: ICD-10-PCS | Performed by: INTERNAL MEDICINE

## 2022-09-25 PROCEDURE — 4A023N7 MEASUREMENT OF CARDIAC SAMPLING AND PRESSURE, LEFT HEART, PERCUTANEOUS APPROACH: ICD-10-PCS | Performed by: INTERNAL MEDICINE

## 2022-09-25 PROCEDURE — 84484 ASSAY OF TROPONIN QUANT: CPT

## 2022-09-25 PROCEDURE — C1753 CATH, INTRAVAS ULTRASOUND: HCPCS

## 2022-09-25 PROCEDURE — C1757 CATH, THROMBECTOMY/EMBOLECT: HCPCS

## 2022-09-25 PROCEDURE — 6370000000 HC RX 637 (ALT 250 FOR IP): Performed by: INTERNAL MEDICINE

## 2022-09-25 PROCEDURE — 92928 PRQ TCAT PLMT NTRAC ST 1 LES: CPT | Performed by: INTERNAL MEDICINE

## 2022-09-25 PROCEDURE — 99291 CRITICAL CARE FIRST HOUR: CPT | Performed by: INTERNAL MEDICINE

## 2022-09-25 PROCEDURE — 99153 MOD SED SAME PHYS/QHP EA: CPT

## 2022-09-25 PROCEDURE — C1894 INTRO/SHEATH, NON-LASER: HCPCS

## 2022-09-25 PROCEDURE — B2131ZZ FLUOROSCOPY OF MULTIPLE CORONARY ARTERY BYPASS GRAFTS USING LOW OSMOLAR CONTRAST: ICD-10-PCS | Performed by: INTERNAL MEDICINE

## 2022-09-25 RX ORDER — PANTOPRAZOLE SODIUM 40 MG/1
40 TABLET, DELAYED RELEASE ORAL DAILY
Status: DISCONTINUED | OUTPATIENT
Start: 2022-09-26 | End: 2022-09-27 | Stop reason: HOSPADM

## 2022-09-25 RX ORDER — ASPIRIN 81 MG/1
324 TABLET, CHEWABLE ORAL ONCE
Status: COMPLETED | OUTPATIENT
Start: 2022-09-25 | End: 2022-09-25

## 2022-09-25 RX ORDER — ONDANSETRON 2 MG/ML
4 INJECTION INTRAMUSCULAR; INTRAVENOUS ONCE
Status: COMPLETED | OUTPATIENT
Start: 2022-09-25 | End: 2022-09-25

## 2022-09-25 RX ORDER — ENOXAPARIN SODIUM 100 MG/ML
40 INJECTION SUBCUTANEOUS NIGHTLY
Status: DISCONTINUED | OUTPATIENT
Start: 2022-09-25 | End: 2022-09-25

## 2022-09-25 RX ORDER — CLONAZEPAM 0.5 MG/1
0.5 TABLET ORAL 2 TIMES DAILY PRN
Status: DISCONTINUED | OUTPATIENT
Start: 2022-09-25 | End: 2022-09-27 | Stop reason: HOSPADM

## 2022-09-25 RX ORDER — DIAZEPAM 5 MG/ML
5 INJECTION, SOLUTION INTRAMUSCULAR; INTRAVENOUS EVERY 4 HOURS PRN
Status: DISCONTINUED | OUTPATIENT
Start: 2022-09-25 | End: 2022-09-27 | Stop reason: HOSPADM

## 2022-09-25 RX ORDER — POLYETHYLENE GLYCOL 3350 17 G/17G
17 POWDER, FOR SOLUTION ORAL DAILY PRN
Status: DISCONTINUED | OUTPATIENT
Start: 2022-09-25 | End: 2022-09-27 | Stop reason: HOSPADM

## 2022-09-25 RX ORDER — ACETAMINOPHEN 650 MG/1
650 SUPPOSITORY RECTAL EVERY 6 HOURS PRN
Status: DISCONTINUED | OUTPATIENT
Start: 2022-09-25 | End: 2022-09-27 | Stop reason: HOSPADM

## 2022-09-25 RX ORDER — ACETAMINOPHEN 325 MG/1
650 TABLET ORAL EVERY 6 HOURS PRN
Status: DISCONTINUED | OUTPATIENT
Start: 2022-09-25 | End: 2022-09-27 | Stop reason: HOSPADM

## 2022-09-25 RX ORDER — SODIUM CHLORIDE 0.9 % (FLUSH) 0.9 %
5-40 SYRINGE (ML) INJECTION PRN
Status: DISCONTINUED | OUTPATIENT
Start: 2022-09-25 | End: 2022-09-27 | Stop reason: HOSPADM

## 2022-09-25 RX ORDER — LANOLIN ALCOHOL/MO/W.PET/CERES
3 CREAM (GRAM) TOPICAL NIGHTLY PRN
Status: DISCONTINUED | OUTPATIENT
Start: 2022-09-25 | End: 2022-09-27 | Stop reason: HOSPADM

## 2022-09-25 RX ORDER — ENOXAPARIN SODIUM 100 MG/ML
1 INJECTION SUBCUTANEOUS 2 TIMES DAILY
Status: DISCONTINUED | OUTPATIENT
Start: 2022-09-25 | End: 2022-09-27 | Stop reason: HOSPADM

## 2022-09-25 RX ORDER — EPTIFIBATIDE 0.75 MG/ML
2 INJECTION, SOLUTION INTRAVENOUS CONTINUOUS
Status: DISCONTINUED | OUTPATIENT
Start: 2022-09-25 | End: 2022-09-26

## 2022-09-25 RX ORDER — ONDANSETRON 4 MG/1
4 TABLET, ORALLY DISINTEGRATING ORAL EVERY 8 HOURS PRN
Status: DISCONTINUED | OUTPATIENT
Start: 2022-09-25 | End: 2022-09-27 | Stop reason: HOSPADM

## 2022-09-25 RX ORDER — ASPIRIN 81 MG/1
81 TABLET ORAL DAILY
Status: DISCONTINUED | OUTPATIENT
Start: 2022-09-26 | End: 2022-09-27 | Stop reason: HOSPADM

## 2022-09-25 RX ORDER — MORPHINE SULFATE 2 MG/ML
2 INJECTION, SOLUTION INTRAMUSCULAR; INTRAVENOUS EVERY 4 HOURS PRN
Status: DISCONTINUED | OUTPATIENT
Start: 2022-09-25 | End: 2022-09-27 | Stop reason: HOSPADM

## 2022-09-25 RX ORDER — MORPHINE SULFATE 4 MG/ML
4 INJECTION, SOLUTION INTRAMUSCULAR; INTRAVENOUS ONCE
Status: COMPLETED | OUTPATIENT
Start: 2022-09-25 | End: 2022-09-25

## 2022-09-25 RX ORDER — SODIUM CHLORIDE 0.9 % (FLUSH) 0.9 %
5-40 SYRINGE (ML) INJECTION EVERY 12 HOURS SCHEDULED
Status: DISCONTINUED | OUTPATIENT
Start: 2022-09-25 | End: 2022-09-27 | Stop reason: HOSPADM

## 2022-09-25 RX ORDER — SODIUM CHLORIDE 9 MG/ML
INJECTION, SOLUTION INTRAVENOUS PRN
Status: DISCONTINUED | OUTPATIENT
Start: 2022-09-25 | End: 2022-09-27

## 2022-09-25 RX ORDER — CLOPIDOGREL BISULFATE 75 MG/1
75 TABLET ORAL DAILY
Status: DISCONTINUED | OUTPATIENT
Start: 2022-09-25 | End: 2022-09-25 | Stop reason: ALTCHOICE

## 2022-09-25 RX ORDER — ONDANSETRON 2 MG/ML
4 INJECTION INTRAMUSCULAR; INTRAVENOUS EVERY 6 HOURS PRN
Status: DISCONTINUED | OUTPATIENT
Start: 2022-09-25 | End: 2022-09-27 | Stop reason: HOSPADM

## 2022-09-25 RX ORDER — EZETIMIBE 10 MG/1
10 TABLET ORAL DAILY
Status: DISCONTINUED | OUTPATIENT
Start: 2022-09-25 | End: 2022-09-25 | Stop reason: RX

## 2022-09-25 RX ORDER — CYCLOBENZAPRINE HCL 10 MG
5 TABLET ORAL 2 TIMES DAILY PRN
Status: DISCONTINUED | OUTPATIENT
Start: 2022-09-25 | End: 2022-09-27 | Stop reason: HOSPADM

## 2022-09-25 RX ADMIN — ONDANSETRON 4 MG: 2 INJECTION INTRAMUSCULAR; INTRAVENOUS at 23:24

## 2022-09-25 RX ADMIN — DIAZEPAM 5 MG: 5 INJECTION, SOLUTION INTRAMUSCULAR; INTRAVENOUS at 19:08

## 2022-09-25 RX ADMIN — METOPROLOL TARTRATE 25 MG: 25 TABLET, FILM COATED ORAL at 22:25

## 2022-09-25 RX ADMIN — ASPIRIN 81 MG 324 MG: 81 TABLET ORAL at 14:47

## 2022-09-25 RX ADMIN — MORPHINE SULFATE 4 MG: 4 INJECTION, SOLUTION INTRAMUSCULAR; INTRAVENOUS at 14:47

## 2022-09-25 RX ADMIN — IOPAMIDOL 242 ML: 755 INJECTION, SOLUTION INTRAVENOUS at 21:57

## 2022-09-25 RX ADMIN — MORPHINE SULFATE 2 MG: 2 INJECTION, SOLUTION INTRAMUSCULAR; INTRAVENOUS at 17:25

## 2022-09-25 RX ADMIN — CLOPIDOGREL BISULFATE 75 MG: 75 TABLET ORAL at 16:48

## 2022-09-25 RX ADMIN — IOPAMIDOL 75 ML: 755 INJECTION, SOLUTION INTRAVENOUS at 13:24

## 2022-09-25 RX ADMIN — MORPHINE SULFATE 4 MG: 4 INJECTION, SOLUTION INTRAMUSCULAR; INTRAVENOUS at 12:46

## 2022-09-25 RX ADMIN — EPTIFIBATIDE 2 MCG/KG/MIN: 75 INJECTION INTRAVENOUS at 22:09

## 2022-09-25 RX ADMIN — MELATONIN TAB 3 MG 3 MG: 3 TAB at 22:25

## 2022-09-25 RX ADMIN — TICAGRELOR 90 MG: 90 TABLET ORAL at 22:25

## 2022-09-25 RX ADMIN — ONDANSETRON 4 MG: 2 INJECTION INTRAMUSCULAR; INTRAVENOUS at 12:50

## 2022-09-25 RX ADMIN — CLONAZEPAM 0.5 MG: 0.5 TABLET ORAL at 22:25

## 2022-09-25 RX ADMIN — CLONAZEPAM 0.5 MG: 0.5 TABLET ORAL at 16:48

## 2022-09-25 ASSESSMENT — ENCOUNTER SYMPTOMS
CONSTIPATION: 0
SHORTNESS OF BREATH: 0
COLOR CHANGE: 0
DIARRHEA: 0
ABDOMINAL PAIN: 0
COUGH: 0
BACK PAIN: 0
NAUSEA: 0
VOMITING: 0
CHEST TIGHTNESS: 0
RESPIRATORY NEGATIVE: 1

## 2022-09-25 ASSESSMENT — PAIN DESCRIPTION - LOCATION
LOCATION: CHEST
LOCATION: CHEST

## 2022-09-25 ASSESSMENT — PAIN SCALES - GENERAL
PAINLEVEL_OUTOF10: 7
PAINLEVEL_OUTOF10: 4
PAINLEVEL_OUTOF10: 6
PAINLEVEL_OUTOF10: 7

## 2022-09-25 ASSESSMENT — HEART SCORE: ECG: 1

## 2022-09-25 ASSESSMENT — PAIN - FUNCTIONAL ASSESSMENT: PAIN_FUNCTIONAL_ASSESSMENT: 0-10

## 2022-09-25 NOTE — ED PROVIDER NOTES
[address] ENCOUNTER        Pt Name: Adry Winters Sr. MRN: [id number]  Birthdate 1970  Date of evaluation: 9/25/2022  Provider: LACHO Boggs  PCP: Jodi REYES  Note Started: 3:08 PM EDT        I have seen and evaluated this patient with my supervising physician Deanne Chahal, [facility]       Chief Complaint   Patient presents with    Chest Pain     Since last night. Hurts to move   + cough       HISTORY OF PRESENT ILLNESS   (Location, Timing/Onset, Context/Setting, Quality, Duration, Modifying Factors, Severity, Associated Signs and Symptoms)  Note limiting factors. Chief Complaint: RAMSES Díaz. is a 47 y.o. male with past medical history of hypertension and CAD status post CABG x3 approximately 6 weeks ago who presents to the ED with complaint of chest pain. Patient states he has had substernal chest pain without any radiation since last night. Patient states he did mow the grass last night. States he pushed mowed the grass. Patient states he developed chest pain afterwards. States pain is worsened and has been persistent till today. He became concerned and came to the ED for further evaluation and treatment. Patient states pain feels similar to pain he had prior to the CABG. He states its with  certain movements. He denies any pain with palpation of the chest.  He denies any wound dehiscence or problems with his surgical wound from his CABG. He denies any cough contrary to nursing triage note. Denies pleuritic pain, orthopnea, pedal edema or calf tenderness. Denies shortness of breath, abd pain, nausea/vomiting, urinary symptoms or changes in bowel moods. Denies headache, lightheadedness/dizziness, syncope or near syncope. Denies diaphoresis. Nursing Notes were all reviewed and agreed with or any disagreements were addressed in the HPI.     REVIEW OF SYSTEMS    (2-9 systems for level 4, 10 or more for level 5)     Review of Systems   Constitutional:  Negative for activity change, appetite change, chills, diaphoresis, fatigue and fever. Respiratory: Negative. Negative for cough, chest tightness and shortness of breath. Cardiovascular:  Positive for chest pain. Negative for palpitations and leg swelling. Gastrointestinal:  Negative for abdominal pain, constipation, diarrhea, nausea and vomiting. Genitourinary:  Negative for decreased urine volume, difficulty urinating, dysuria, flank pain, frequency, hematuria and urgency. Musculoskeletal:  Negative for arthralgias, back pain, myalgias, neck pain and neck stiffness. Skin:  Negative for color change, pallor, rash and wound. Neurological:  Negative for dizziness, light-headedness and headaches. Positives and Pertinent negatives as per HPI. Except as noted above in the ROS, all other systems were reviewed and negative. PAST MEDICAL HISTORY     Past Medical History:   Diagnosis Date    Hypertension     MI (mitral incompetence)          SURGICAL HISTORY     Past Surgical History:   Procedure Laterality Date    APPENDECTOMY      BACK SURGERY      CORONARY ANGIOPLASTY WITH STENT PLACEMENT      CORONARY ARTERY BYPASS GRAFT N/A 8/1/2022    URGENT CABG CORONARY ARTERY BYPASS X 3, LIMA GRAFT TO DISTAL LAD, VEIN GRAFT TO RCA, VEIN GRAFT TO OM1, LIGATION DEJAH WITH 40MM ATRICLIP, EVH LEFT LEG, EXCISION OF PRESTERNAL SEBACEOUS CYST, TYLER, TOTAL CPB, INSERTION OF TEMPORARY VENTRICULAR PACING WIRES, DOPPLER BYPASS GRAFT FLOW VERIFICATION, 5 LEVEL BILATERAL INTERCOSTAL NERVE BLOCK USING EXPAREL AND MARCAINE performed by MD JEFFREY Johnson  11/15/2020    NASAL SEPTUM SURGERY  11/2014         CURRENTMEDICATIONS       Previous Medications    ASPIRIN EC 81 MG EC TABLET    Take 1 tablet by mouth daily    CLONAZEPAM (KLONOPIN) 0.5 MG TABLET    Take 0.5 mg by mouth 2 times daily as needed.     CLOPIDOGREL (PLAVIX) 75 MG TABLET Take 1 tablet by mouth daily    EZETIMIBE (ZETIA) 10 MG TABLET    Take 1 tablet by mouth in the morning. FUROSEMIDE (LASIX) 20 MG TABLET    Take 1 tablet by mouth in the morning for 7 days. METOPROLOL TARTRATE (LOPRESSOR) 25 MG TABLET    Take 1 tablet by mouth in the morning and 1 tablet before bedtime. MOMETASONE-FORMOTEROL (DULERA) 200-5 MCG/ACT INHALER    Inhale 2 puffs into the lungs in the morning and 2 puffs in the evening. PANTOPRAZOLE (PROTONIX) 40 MG TABLET    Take 40 mg by mouth daily    POTASSIUM CHLORIDE (KLOR-CON M) 20 MEQ TBCR EXTENDED RELEASE TABLET    Take 1 tablet by mouth in the morning for 7 days. SENNOSIDES-DOCUSATE SODIUM (SENOKOT-S) 8.6-50 MG TABLET    Take 1 tablet by mouth in the morning and 1 tablet before bedtime. ALLERGIES     Fenofibrate and Statins    FAMILYHISTORY       Family History   Problem Relation Age of Onset    High Blood Pressure Mother     High Cholesterol Mother     Diabetes Mother     Heart Disease Father     Diabetes Father     High Blood Pressure Father     High Cholesterol Father           SOCIAL HISTORY       Social History     Tobacco Use    Smoking status: Former     Packs/day: 0.25     Types: Cigarettes     Quit date: 2022     Years since quittin.2    Smokeless tobacco: Never    Tobacco comments:     1 ppd   Vaping Use    Vaping Use: Never used   Substance Use Topics    Alcohol use: No    Drug use: Yes     Types: Marijuana Berneta Robert)     Comment: SMOKE       SCREENINGS    Jared Coma Scale  Eye Opening: Spontaneous  Best Verbal Response: Oriented  Best Motor Response: Obeys commands  [place] Coma Scale Score: 15 Heart Score for chest pain patients  History:  Moderately Suspicious  ECG: Non-Specifc repolarization disturbance/LBTB/PM  Patient Age: > 39 and < 65 years  *Risk factors for Atherosclerotic disease: Coronary Artery Disease  Risk Factors: > 3 Risk factors or history of atherosclerotic disease*  Troponin: < 1X normal limit  Heart Score Total: 5      PHYSICAL EXAM    (up to 7 for level 4, 8 or more for level 5)     ED Triage Vitals [09/25/22 1158]   BP Temp Temp src Heart Rate Resp SpO2 Height Weight   (!) 164/100 97.1 °F (36.2 °C) -- 69 16 98 % 6' 1\" (1.854 m) 220 lb (99.8 kg)       Physical Exam  Constitutional:       General: He is not in acute distress. Appearance: Normal appearance. He is well-developed. He is not ill-appearing, toxic-appearing or diaphoretic. HENT:      Head: Normocephalic and atraumatic. Right Ear: External ear normal.      Left Ear: External ear normal.   Eyes:      General:         Right eye: No discharge. Left eye: No discharge. Conjunctiva/sclera: Conjunctivae normal.   Cardiovascular:      Rate and Rhythm: Normal rate and regular rhythm. Pulses: Normal pulses. Heart sounds: Normal heart sounds. No murmur heard. No friction rub. No gallop. Comments: 2+ radial pulses bilaterally. No pedal edema. No calf tenderness. No JVD. Pulmonary:      Effort: Pulmonary effort is normal. No respiratory distress. Breath sounds: Normal breath sounds. No stridor. No wheezing, rhonchi or rales. Chest:      Chest wall: No tenderness. Abdominal:      General: Abdomen is flat. Bowel sounds are normal. There is no distension. Palpations: Abdomen is soft. There is no mass. Tenderness: There is no abdominal tenderness. There is no right CVA tenderness, left CVA tenderness, guarding or rebound. Hernia: No hernia is present. Musculoskeletal:         General: Normal range of motion. Cervical back: Normal range of motion and neck supple. Skin:     General: Skin is warm and dry. Coloration: Skin is not pale. Findings: No erythema or rash. Neurological:      Mental Status: He is alert and oriented to person, place, and time.    Psychiatric:         Behavior: Behavior normal.       DIAGNOSTIC RESULTS   LABS:    Labs Reviewed   CBC WITH AUTO DIFFERENTIAL - Abnormal; Notable for the following components:       Result Value    RDW 16.2 (*)     All other components within normal limits   COMPREHENSIVE METABOLIC PANEL - Abnormal; Notable for the following components:    Glucose 109 (*)     Alkaline Phosphatase 131 (*)     All other components within normal limits   TROPONIN - Abnormal; Notable for the following components:    Troponin 0.02 (*)     All other components within normal limits   BRAIN NATRIURETIC PEPTIDE - Abnormal; Notable for the following components:    Pro- (*)     All other components within normal limits   LIPASE   TROPONIN       When ordered only abnormal lab results are displayed. All other labs were within normal range or not returned as of this dictation. EKG: When ordered, EKG's are interpreted by the Emergency Department Physician in the absence of a cardiologist.  Please see their note for interpretation of EKG. RADIOLOGY:   Non-plain film images such as CT, Ultrasound and MRI are read by the radiologist. Plain radiographic images are visualized and preliminarily interpreted by the ED Provider with the below findings:        Interpretation per the Radiologist below, if available at the time of this note:    CTA CHEST [address]   Final Result   1. CHEST: No acute vascular abnormality is identified. There is no thoracic   aortic dissection or aneurysm. 2. Status post sternotomy. No parasternal fluid collection or complication   is evident. 3. No acute cardiopulmonary disease. 4. ABDOMEN PELVIS: No acute vascular abnormality of the abdomen or pelvis. 5. 3.2 cm aneurysm of the infrarenal abdominal aorta. 3 year follow-up   imaging is recommended. 6. No acute abdominopelvic abnormality otherwise identified. 7. Cholelithiasis. Hepatic steatosis. RECOMMENDATIONS:   3.2 cm infrarenal abdominal aortic aneurysm. Recommend follow-up every 3   years. Reference: J Am Romi Radiol 2865;[id number].          XR CHEST PORTABLE   Final Result   No acute abnormality. XR CHEST PORTABLE    Result Date: 9/25/2022  EXAMINATION: ONE XRAY VIEW OF THE CHEST 9/25/2022 12:10 pm COMPARISON: 08/02/2022 HISTORY: Acute chest pain and cough. FINDINGS: Post CABG changes. Cardiomediastinal silhouette and pulmonary vasculature are normal. No consolidation, pleural effusion, or pneumothorax. No acute abnormality. CTA CHEST ABDOMEN PELVIS W CONTRAST    Result Date: 9/25/2022  EXAMINATION: CTA OF THE CHEST, ABDOMEN AND PELVIS WITH CONTRAST 9/25/2022 1:09 pm: TECHNIQUE: CTA of the chest, abdomen and pelvis was performed after the administration of intravenous contrast.  Multiplanar reformatted images are provided for review. MIP images are provided for review. Automated exposure control, iterative reconstruction, and/or weight based adjustment of the mA/kV was utilized to reduce the radiation dose to as low as reasonably achievable. COMPARISON: None. HISTORY: ORDERING SYSTEM PROVIDED HISTORY: cp - hx cabgx3 6 weeks ago - ro dissection TECHNOLOGIST PROVIDED HISTORY: Reason for exam:->cp - hx cabgx3 6 weeks ago - ro dissection Decision Support Exception - unselect if not a suspected or confirmed emergency medical condition->Emergency Medical Condition (MA) Reason for Exam: Chest Pain (Since last night. Hurts to move   + cough FINDINGS: CTA CHEST: Thoracic aorta: On the non contrasted series, no paraaortic hematoma or mural calcification of the thoracic aorta is identified. The ascending thoracic aorta is 33 mm in diameter. There is no dissection or aneurysm. The left vertebral artery has separate origin from the arch, bleach weaning the left common and subclavian arteries. Great vessels are widely patent. Mediastinum: The heart is normal in size. Left atrial appendage clip and sternotomy wires are present. No mediastinal or hilar adenopathy. Lungs/Pleura: No pulmonary nodule, consolidation or pleural effusion.  Soft Tissues/Bones: No acute osseous injury is appreciated. Chest wall is unremarkable. CTA ABDOMEN/PELVIS: CTA/vascular: The aorto iliac vessels and common femoral arteries are widely patent. There is moderate mural plaque of the infrarenal abdominal aorta. It measures 3.2 cm in diameter. The celiac trunk, superior mesenteric artery and inferior mesenteric artery are patent. Celiac trunk has conventional anatomy. There is mild narrowing at the origin of the RODY. Organs: There is mild diffuse fatty infiltration of the liver. Gallbladder stone is present. The pancreas, spleen and adrenal glands are unremarkable. The kidneys enhance symmetrically. There is no hydronephrosis. GI/Bowel: In the gastric pylorus there is lipomatous material, food versus lipoma. No follow-up is recommended. No dilated loops of small bowel are identified. There is mild diverticulosis of the left colon, lacking acute inflammatory changes. Pelvis: Urinary bladder and prostate gland are unremarkable. Fat containing inguinal hernias are present. Peritoneum/Retroperitoneum: There is no adenopathy or free fluid. Bones/Soft Tissues: No acute osseous abnormality is identified. Abdominal wall is unremarkable. 1. CHEST: No acute vascular abnormality is identified. There is no thoracic aortic dissection or aneurysm. 2. Status post sternotomy. No parasternal fluid collection or complication is evident. 3. No acute cardiopulmonary disease. 4. ABDOMEN PELVIS: No acute vascular abnormality of the abdomen or pelvis. 5. 3.2 cm aneurysm of the infrarenal abdominal aorta. 3 year follow-up imaging is recommended. 6. No acute abdominopelvic abnormality otherwise identified. 7. Cholelithiasis. Hepatic steatosis. RECOMMENDATIONS: 3.2 cm infrarenal abdominal aortic aneurysm. Recommend follow-up every 3 years. Reference: J Am Romi Radiol 3099;70:[id number].            PROCEDURES   Unless otherwise noted below, none     Procedures    CRITICAL CARE TIME CONSULTS:  None      EMERGENCY DEPARTMENT COURSE and DIFFERENTIAL DIAGNOSIS/MDM:   Vitals:    Vitals:    09/25/22 1415 09/25/22 1430 09/25/22 1445 09/25/22 1451   BP: (!) 153/102 (!) 167/105 (!) 154/107 (!) 141/96   Pulse: 71 73 72 75   Resp: 16 23 18 17   Temp:       SpO2: 100% 99% 99% 100%   Weight:       Height:           Patient was given the following medications:  Medications   morphine injection 4 mg (4 mg IntraVENous Given 9/25/22 1246)   ondansetron (ZOFRAN) injection 4 mg (4 mg IntraVENous Given 9/25/22 1250)   iopamidol (ISOVUE-370) 76 % injection 75 mL (75 mLs IntraVENous Given 9/25/22 1324)   aspirin chewable tablet 324 mg (324 mg Oral Given 9/25/22 1447)   morphine injection 4 mg (4 mg IntraVENous Given 9/25/22 1447)         Is this patient to be included in the SEP-1 Core Measure due to severe sepsis or septic shock? No   Exclusion criteria - the patient is NOT to be included for SEP-1 Core Measure due to: Infection is not suspected    Patient is a 27-year-old male who presents to the ED with complaint of chest pain. He has heart score of 5. Complaint of chest pain that occurred last night and worsened today. Came to the ED for further evaluation and treatment. CBC showed normal white count, hemoglobin and platelets. CMP relatively unremarkable. Troponin 0.02. BNP unremarkable. Lipase was normal. Chest x-ray showed no acute abnormality. EKG interpreted by attending. CT of the chest abdomen pelvis was obtained. CT showed no evidence of thoracic aortic dissection or aneurysm. No parasternal fluid collection or complication evident. No acute cardiopulmonary disease noted. There is a 3.2 cm aneurysm to the infrarenal abdominal aorta. Otherwise no acute abnormality noted. Cholelithiasis without acute cholecystitis noted. FINAL IMPRESSION      1.  Chest pain, unspecified type          DISPOSITION/PLAN   DISPOSITION Decision To Admit 09/25/2022 02:58:30 PM      PATIENT REFERRED

## 2022-09-25 NOTE — CONSULTS
[facility]  H+P  Consult  OP Visit  FU Visit   CC HPI   cp Patient presents with cp. CP substernal, pressure, began after mowing grass, associated with sob. EKG with new anterior ST and T changes suggesting ischemia. Troponin elevated. Pain ongoing at 5/10. HISTORY/ALLERGY/ROS   MEDHx  has a past medical history of Hypertension and MI (mitral incompetence). SURGHx  has a past surgical history that includes Coronary angioplasty with stent; Appendectomy; Nasal septum surgery (11/2014); LASIK (11/15/2020); back surgery; and Coronary artery bypass graft (N/A, 8/1/2022). SOCHx  reports that he quit smoking about 2 months ago. His smoking use included cigarettes. He smoked an average of .25 packs per day. He has never used smokeless tobacco. He reports current drug use. Drug: Marijuana Darryle Pimple). He reports that he does not drink alcohol. FAMHx family history includes Diabetes in his father and mother; Heart Disease in his father; High Blood Pressure in his father and mother; High Cholesterol in his father and mother.    ALLERG Fenofibrate and Statins   ROS Full ROS obtained and negative except as mentioned in HPI   MEDICATIONS   Current Facility-Administered Medications   Medication Dose Route Frequency Provider Last Rate Last Admin    [START ON 9/26/2022] aspirin EC tablet 81 mg  81 mg Oral Daily Candace Granados MD        clonazePAM Bianca Lermaleau) tablet 0.5 mg  0.5 mg Oral BID PRN Solange Alan MD   0.5 mg at 09/25/22 1648    clopidogrel (PLAVIX) tablet 75 mg  75 mg Oral Daily Solange Alan MD   75 mg at 09/25/22 1648    metoprolol tartrate (LOPRESSOR) tablet 25 mg  25 mg Oral BID MD Allen Rios Older ON 9/26/2022] pantoprazole (PROTONIX) tablet 40 mg  40 mg Oral Daily Candace Granados MD        sodium chloride flush 0.9 % injection 5-40 mL  5-40 mL IntraVENous 2 times per day Solange Alan MD        sodium chloride flush 0.9 % injection 5-40 mL  5-40 mL IntraVENous PRN Solange Alan MD        0.9 % sodium chloride infusion   IntraVENous PRN Vida Crum MD        ondansetron (ZOFRAN-ODT) disintegrating tablet 4 mg  4 mg Oral Q8H PRN Vida Crum MD        Or    ondansetron (ZOFRAN) injection 4 mg  4 mg IntraVENous Q6H PRN Vida Crum MD        polyethylene glycol (GLYCOLAX) packet 17 g  17 g Oral Daily PRN Vida Crum MD        acetaminophen (TYLENOL) tablet 650 mg  650 mg Oral Q6H PRN Vida Crum MD        Or    acetaminophen (TYLENOL) suppository 650 mg  650 mg Rectal Q6H PRN Vida Crum MD        morphine (PF) injection 2 mg  2 mg IntraVENous Q4H PRN Vida Crum MD   2 mg at 09/25/22 1725    enoxaparin (LOVENOX) injection 100 mg  1 mg/kg SubCUTAneous BID Vida Crum MD        diazePAM (VALIUM) injection 5 mg  5 mg IntraVENous Q4H PRN Vida Crum MD   5 mg at 09/25/22 1908    cyclobenzaprine (FLEXERIL) tablet 5 mg  5 mg Oral BID PRN Vida Crum MD        melatonin tablet 3 mg  3 mg Oral Nightly PRN Vida Crum MD          PHYSICAL EXAM   Vitals Vitals:    09/25/22 1758   BP:    Pulse: 64   Resp: 18   Temp:    SpO2: 99%      Gen Alert, coop, no distress Heart  Rrr, no mrg   Head NC, AT, no abnorm Abd  Soft, NT, +BS, no mass, no OM   Eyes PER, conj/corn clear Ext  Ext nl, AT, no C/C/E   Nose Nares nl, no drain, NT Pulse 2+ and symmetric   Throat Lips, mucosa, tongue nl Skin Col/text/turg nl, no vis rash/les   Neck S/S, TM, NT, no bruit/JVD Psych Nl mood and affect   Lung CTA-B, unlabored, no DTP Lymph   No cervical or axillary LA   Ch wall NT, no deform Neuro  Nl gross M/S exam      ASSESSMENT AND PLAN     *CAD/NSTEMI   Date EF Results   Sx   Typical   Hx 7/22  CABG   LHC 7/22  MVD (LIMA-LAD, SVG-RCA, SVG-OM1)   TTE 7/22 45%    Plan   Emergent LHC, R/B/O discussede   *HTN  Status Controlled   Plan Counseled on diet/salt/exercise/weight, continue meds at doses above   *CHOL     Plan Counseled on diet/exercise/weight, continue HI/MT statin     Critical Care   Due to the high probability of clinically significant life threating deterioration of the patient's condition that required my urgent intervention, a total critical care time of >35 minutes was used. This time excludes any time that may have been spent performing procedures. This includes but not limited to vital sign monitoring, telemetry monitoring, continuous pulse oximety, IV medication, clinical response to the IV medications, documentation time , consultation time, interpretation of lab data, review of nursing notes and old record review. All questions and concerns were addressed to the patient/family. Alternatives to my treatment were discussed. The note was completed using EMR. Every effort was made to ensure accuracy; however, inadvertent computerized transcription errors may be present.

## 2022-09-25 NOTE — ED PROVIDER NOTES
In addition to the advanced practice provider, I personally saw Mortimer Dawson  and performed a substantive portion of the visit including all aspects of the medical decision making. Briefly, this is a 47 y.o. male here for left-sided aching and pressure-like chest pain. Patient states the pain began yesterday evening after he mowed the lawn, believes he may have overexerted himself or strained a muscle wall pulling the lawnmower. Pain radiates into his left neck and shoulder. Patient states pain feels similar to when he had a heart attack 6 weeks ago, underwent 3-vessel CABG on 8/1/22    On exam, patient afebrile and nontoxic. No distress. Heart RRR. Lungs CTAB. Abdomen soft, nondistended, nontender to palpation in all quadrants. Radial 2+ and symmetric.  5 out of 5 motor and sensation grossly intact bilateral upper extremities. Sternotomy surgical scar appears well approximated without external evidence of infection. There is mild tenderness reproduced with palpation of left anterior chest wall, no crepitus. EKG  EKG was reviewed by emergency department physician in the absence of a cardiologist    Narrow complex sinus rhythm, rate 71, normal axis, normal MI and QRS intervals, normal Qtc, no ST elevations, trace lateral and septal ST depressions, TWI V2 and V3, impression NSR with nonspecific ST-T morphology, no STEMI, ST-T morphology appears new from comparison 8/1/2022 (comparison pre-CABG)      Screenings   [place] Coma Scale  Eye Opening: Spontaneous  Best Verbal Response: Oriented  Best Motor Response: Obeys commands  Jared Coma Scale Score: 15 Heart Score for chest pain patients  History:  Moderately Suspicious  ECG: Non-Specifc repolarization disturbance/LBTB/PM  Patient Age: > 39 and < 65 years  *Risk factors for Atherosclerotic disease: Coronary Artery Disease  Risk Factors: > 3 Risk factors or history of atherosclerotic disease*  Troponin: < 1X normal limit  Heart Score Total: 5    Is this patient to be included in the SEP-1 Core Measure due to severe sepsis or septic shock? No   Exclusion criteria - the patient is NOT to be included for SEP-1 Core Measure due to: Infection is not suspected      MDM    Patient afebrile and nontoxic. He appears anxious however no venita painful or respiratory distress. No hypoxia or increased work of breathing. EKG with new nonspecific ST-T morphology, however no STEMI, initial troponin minimally elevated at 0.02. CT imaging reveals no evidence of pulmonary embolism, dissection, surgical dehiscence, hemothorax or significant pleural effusion/edema. Aside from mildly elevated troponin, laboratories were otherwise reassuring with no evidence of acute endorgan dysfunction or clinically significant electrolyte derangement. Patient given aspirin. Given minimally elevated troponin and ongoing chest pain, will plan for admission for further evaluation and care. Case discussed with internal medicine team who will admit. Patient alert, hemodynamically stable and in no distress at this time. I Dr. Yadira Wallace am the primary clinician of record. Patient Referrals:  No follow-up provider specified. Discharge Medications:  Current Discharge Medication List          FINAL IMPRESSION  1. Chest pain, unspecified type        Blood pressure (!) 144/93, pulse 64, temperature 97.2 °F (36.2 °C), temperature source Axillary, resp. rate 18, height 6' 1\" (1.854 m), weight 220 lb (99.8 kg), SpO2 99 %, peak flow (!) 18 L/min. For further details of Ulises Thomas Sr.'s emergency department encounter, please see documentation by advanced practice provider, LACHO Redd.     Sweta Mccormick DO (electronically signed)  Attending Emergency Physician       Sweta Mccormick DO  09/25/22 Robbie Morrell

## 2022-09-25 NOTE — PROGRESS NOTES
Pt upto room 3317 from ER.vitals obtained and are stable. Pt is placed on tele. Pt stated \" he is having an anxiety\". Call light with in reach, fall precautions in place. Will monitor.

## 2022-09-26 PROBLEM — I21.4 NSTEMI (NON-ST ELEVATED MYOCARDIAL INFARCTION) (HCC): Status: ACTIVE | Noted: 2022-09-26

## 2022-09-26 LAB
ANION GAP SERPL CALCULATED.3IONS-SCNC: 11 MMOL/L (ref 3–16)
BUN BLDV-MCNC: 12 MG/DL (ref 7–20)
CALCIUM SERPL-MCNC: 9.1 MG/DL (ref 8.3–10.6)
CHLORIDE BLD-SCNC: 104 MMOL/L (ref 99–110)
CO2: 24 MMOL/L (ref 21–32)
CREAT SERPL-MCNC: 0.8 MG/DL (ref 0.9–1.3)
EKG ATRIAL RATE: 62 BPM
EKG ATRIAL RATE: 71 BPM
EKG DIAGNOSIS: NORMAL
EKG DIAGNOSIS: NORMAL
EKG P AXIS: 19 DEGREES
EKG P AXIS: 30 DEGREES
EKG P-R INTERVAL: 202 MS
EKG P-R INTERVAL: 212 MS
EKG Q-T INTERVAL: 416 MS
EKG Q-T INTERVAL: 434 MS
EKG QRS DURATION: 94 MS
EKG QRS DURATION: 96 MS
EKG QTC CALCULATION (BAZETT): 440 MS
EKG QTC CALCULATION (BAZETT): 452 MS
EKG R AXIS: 3 DEGREES
EKG R AXIS: 4 DEGREES
EKG T AXIS: 60 DEGREES
EKG T AXIS: 65 DEGREES
EKG VENTRICULAR RATE: 62 BPM
EKG VENTRICULAR RATE: 71 BPM
GFR AFRICAN AMERICAN: >60
GFR NON-AFRICAN AMERICAN: >60
GLUCOSE BLD-MCNC: 111 MG/DL (ref 70–99)
HCT VFR BLD CALC: 40.2 % (ref 40.5–52.5)
HEMOGLOBIN: 13.3 G/DL (ref 13.5–17.5)
MAGNESIUM: 1.8 MG/DL (ref 1.8–2.4)
MCH RBC QN AUTO: 28.2 PG (ref 26–34)
MCHC RBC AUTO-ENTMCNC: 33.2 G/DL (ref 31–36)
MCV RBC AUTO: 85.1 FL (ref 80–100)
PDW BLD-RTO: 15.8 % (ref 12.4–15.4)
PHOSPHORUS: 3.5 MG/DL (ref 2.5–4.9)
PLATELET # BLD: 205 K/UL (ref 135–450)
PMV BLD AUTO: 6.8 FL (ref 5–10.5)
POTASSIUM SERPL-SCNC: 3.7 MMOL/L (ref 3.5–5.1)
RBC # BLD: 4.72 M/UL (ref 4.2–5.9)
SODIUM BLD-SCNC: 139 MMOL/L (ref 136–145)
TROPONIN: 1.94 NG/ML
WBC # BLD: 6.7 K/UL (ref 4–11)

## 2022-09-26 PROCEDURE — 94760 N-INVAS EAR/PLS OXIMETRY 1: CPT

## 2022-09-26 PROCEDURE — 93010 ELECTROCARDIOGRAM REPORT: CPT | Performed by: INTERNAL MEDICINE

## 2022-09-26 PROCEDURE — 99233 SBSQ HOSP IP/OBS HIGH 50: CPT | Performed by: INTERNAL MEDICINE

## 2022-09-26 PROCEDURE — 6360000002 HC RX W HCPCS: Performed by: FAMILY MEDICINE

## 2022-09-26 PROCEDURE — 84484 ASSAY OF TROPONIN QUANT: CPT

## 2022-09-26 PROCEDURE — 36415 COLL VENOUS BLD VENIPUNCTURE: CPT

## 2022-09-26 PROCEDURE — 96372 THER/PROPH/DIAG INJ SC/IM: CPT

## 2022-09-26 PROCEDURE — 85347 COAGULATION TIME ACTIVATED: CPT

## 2022-09-26 PROCEDURE — 85027 COMPLETE CBC AUTOMATED: CPT

## 2022-09-26 PROCEDURE — 6370000000 HC RX 637 (ALT 250 FOR IP): Performed by: INTERNAL MEDICINE

## 2022-09-26 PROCEDURE — 2580000003 HC RX 258: Performed by: FAMILY MEDICINE

## 2022-09-26 PROCEDURE — 80048 BASIC METABOLIC PNL TOTAL CA: CPT

## 2022-09-26 PROCEDURE — 84100 ASSAY OF PHOSPHORUS: CPT

## 2022-09-26 PROCEDURE — 2000000000 HC ICU R&B

## 2022-09-26 PROCEDURE — 6370000000 HC RX 637 (ALT 250 FOR IP): Performed by: FAMILY MEDICINE

## 2022-09-26 PROCEDURE — 83735 ASSAY OF MAGNESIUM: CPT

## 2022-09-26 RX ORDER — ATROPINE SULFATE 0.1 MG/ML
INJECTION INTRAVENOUS
Status: DISCONTINUED
Start: 2022-09-26 | End: 2022-09-26 | Stop reason: WASHOUT

## 2022-09-26 RX ADMIN — MORPHINE SULFATE 2 MG: 2 INJECTION, SOLUTION INTRAMUSCULAR; INTRAVENOUS at 01:08

## 2022-09-26 RX ADMIN — ENOXAPARIN SODIUM 100 MG: 100 INJECTION SUBCUTANEOUS at 21:27

## 2022-09-26 RX ADMIN — CLONAZEPAM 0.5 MG: 0.5 TABLET ORAL at 08:22

## 2022-09-26 RX ADMIN — ENOXAPARIN SODIUM 100 MG: 100 INJECTION SUBCUTANEOUS at 08:22

## 2022-09-26 RX ADMIN — PANTOPRAZOLE SODIUM 40 MG: 40 TABLET, DELAYED RELEASE ORAL at 07:00

## 2022-09-26 RX ADMIN — TICAGRELOR 90 MG: 90 TABLET ORAL at 21:26

## 2022-09-26 RX ADMIN — SODIUM CHLORIDE, PRESERVATIVE FREE 10 ML: 5 INJECTION INTRAVENOUS at 21:39

## 2022-09-26 RX ADMIN — ASPIRIN 81 MG: 81 TABLET, COATED ORAL at 08:24

## 2022-09-26 RX ADMIN — CLONAZEPAM 0.5 MG: 0.5 TABLET ORAL at 18:02

## 2022-09-26 RX ADMIN — METOPROLOL TARTRATE 25 MG: 25 TABLET, FILM COATED ORAL at 08:20

## 2022-09-26 RX ADMIN — METOPROLOL TARTRATE 25 MG: 25 TABLET, FILM COATED ORAL at 21:26

## 2022-09-26 RX ADMIN — ONDANSETRON 4 MG: 2 INJECTION INTRAMUSCULAR; INTRAVENOUS at 18:02

## 2022-09-26 RX ADMIN — CLONAZEPAM 0.5 MG: 0.5 TABLET ORAL at 21:39

## 2022-09-26 ASSESSMENT — PAIN - FUNCTIONAL ASSESSMENT: PAIN_FUNCTIONAL_ASSESSMENT: ACTIVITIES ARE NOT PREVENTED

## 2022-09-26 ASSESSMENT — PAIN DESCRIPTION - DESCRIPTORS: DESCRIPTORS: TENDER

## 2022-09-26 ASSESSMENT — PAIN SCALES - GENERAL
PAINLEVEL_OUTOF10: 6
PAINLEVEL_OUTOF10: 0
PAINLEVEL_OUTOF10: 1
PAINLEVEL_OUTOF10: 0
PAINLEVEL_OUTOF10: 1

## 2022-09-26 ASSESSMENT — PAIN DESCRIPTION - LOCATION: LOCATION: CHEST

## 2022-09-26 ASSESSMENT — PAIN DESCRIPTION - ORIENTATION: ORIENTATION: RIGHT;UPPER

## 2022-09-26 NOTE — PROGRESS NOTES
EVENTS:9/27 chest pain with ekg AW changes  Cath lab. PCI KARLY  OM1 ( failed  SVG - OM1), LM into CX. NEURO: no def    CARDIAC: NSR.  S1S2. On brilinta,asa, metoprolol  Off integrillin  Pain free      RESP: RA    GI:Active BS      SKIN:intact.  R groin no bleeding, hematoma    MUSCUSKELETAL:wnl  Will increase activity      LINES: RAC IV

## 2022-09-26 NOTE — PROGRESS NOTES
ACT drawn and resulted 154. Patient currently on aggrastat gtt at 16  ML/hr. Patient instructed on removal procedure. Pre medicated  Arterial sheath site without hematoma or oozing. Arterial sheath removed per policy without difficulty. Integrity of sheath inspected upon removal and no abnormalities noted. Sheath pulled at 0103 Manual pressure held X 20 minutes. Dry, sterile tegaderm applied. Pressure dressing applied. Patient tolerated well. Vital signs, groin checks, and pedal pulses will be completed per protocol (every 15 minutes X 4, every 30 minutes X 2, and every hour X 2 per protocol). Sheath removed by  Juan Mauro.

## 2022-09-26 NOTE — OP NOTE
[address]   [facility] Operative Note     Procedure Summary  Procedure [facility]   Indication NSTEMI   Consent Obtained   Access RRA   US US guidance used to determine artery patency, size (>2mm), anatomic variations and ideal puncture location. Real-time US utilized concurrent with vascular needle entry into artery. Image(s) permanently recorded and reported in chart. Bleed Risk Low   Sedation Minimal conscious sedation for patient comfort. Independent trained observer pushed medications at my direction. We monitored the patient's level of consciousness and vital signs/physiologic status throughout the procedure duration (see start and stop times above, as well as medication dosages). Start Time 2002   Stop Time 2125   Versed 10mg   Fentanyl 300mcg   Contrast 242cc   Flouro 20.7   EBL <41CN   Complicat None   Specimens None     Findings  Artery Findings/Result   LM 90% distal   LAD 70% prox, patent mid stent, competitive mid flow from LIMA   Cx 90% ostial, OM1 100% mid   RI N/A   RCA 95% mid,70% mid, competitive distal flow   S-PDA patent   S-% prox   L-LAD Patent, 40% distal anastomotic tapering   LVEDP 6   LVG NA     Intervention(s)  Artery Location Intervention Result   OM1 Os-mid 2.5X28 overlapping prox 2.5X15 Xience KARLY No residual   LM-Cx Dis-prox 3.5X15 Xience KARLY (PD 3.5NC, prox to mid PD 4. 0NC) No residual   LM-Cx Dis-prox MLA 2.13, Cx prox 3.5, LM distal 4.0      Post Cath Dx: Failed SVG-OM1  Attempted aspiration of SVG - significant clot burden and aborted  Successful PCI of native LM into Cx as well as OM1 (previously bypassed)    Med Recommendations  Recommendation Indicated?  Not Given Due To: Detail(s)   DAPT  Yes  Asa, brilinta   STATIN - HIGH DOSE Yes allergic    BETA BLOCKER Yes  metoprolol   ACE/ARB/ARNI Yes Low bp    ALDACTONE Yes Low bp      Non-Med Recommendations  Category Recommendation   EF ASSESSMENT Noninvasive assessment of EF if LVG deferred as IP/OP as appropriate TOBACCO Avoidance of first and second hand smoke   DIET/EXERCISE Maintain healthy lifestyle with focus on diet, exercise and weight loss   CARDIAC REHAB Referral to outpatient cardiac rehab phase II will be deferred until patient follow-up in office and then determine patient safety and appropriateness to proceed. STATIN Patient started or continued on max tolerated dose of statin or high intensity statin as appropriate.   If no statin prescribed, secondary to intolerance, allergy or patient refusal.

## 2022-09-26 NOTE — PROGRESS NOTES
Hospitalist Progress Note      PCP: Cheryl REYES    Date of Admission: 9/25/2022    Chief Complaint: Chest pain    Hospital Course: Barb Porter is a 47 y.o. male with h/o CAD, s/p CABG 8/5/2022 on aspirin and Plavix, HTN, Nicotine use, presented with c/o chest pain and cough on going since last night. ED work up showed elevated troponin 0.02. EKG new anterior ST-T changes. Troponin trended up from 0.04---> 0.14, peak 1.94. He was taken to the Cath Lab with PCI of LM into LCx and OM1. Subjective: Patient seen and examined. Mild oozing noted at the right groin. No chest pain, dyspnea, fever or chills. Mild nausea without vomiting. Medications:  Reviewed    Infusion Medications    sodium chloride       Scheduled Medications    aspirin EC  81 mg Oral Daily    metoprolol tartrate  25 mg Oral BID    pantoprazole  40 mg Oral Daily    sodium chloride flush  5-40 mL IntraVENous 2 times per day    enoxaparin  1 mg/kg SubCUTAneous BID    ticagrelor  90 mg Oral BID     PRN Meds: clonazePAM, sodium chloride flush, sodium chloride, ondansetron **OR** ondansetron, polyethylene glycol, acetaminophen **OR** acetaminophen, morphine, diazePAM, cyclobenzaprine, melatonin      Intake/Output Summary (Last 24 hours) at 9/26/2022 1138  Last data filed at 9/25/2022 1755  Gross per 24 hour   Intake 240 ml   Output --   Net 240 ml       Physical Exam Performed:    BP 95/73   Pulse 63   Temp 97.1 °F (36.2 °C) (Temporal)   Resp 17   Ht 6' 1\" (1.854 m)   Wt 220 lb (99.8 kg)   SpO2 96%   PF (!) 18 L/min   BMI 29.03 kg/m²     General appearance: No apparent distress, appears stated age and cooperative. HEENT: Pupils equal, round, and reactive to light. Conjunctivae/corneas clear. Neck: Supple, with full range of motion. No jugular venous distention. Trachea midline. Respiratory:  Normal respiratory effort. Clear to auscultation, bilaterally without Rales/Wheezes/Rhonchi.   Cardiovascular: Regular rate and rhythm with normal S1/S2 without murmurs, rubs or gallops. Abdomen: Soft, non-tender, non-distended with normal bowel sounds. Musculoskeletal: No clubbing, cyanosis or edema bilaterally. Full range of motion without deformity. Skin: Skin color, texture, turgor normal.  Rash with scab in the groin Neurologic:  Neurovascularly intact without any focal sensory/motor deficits. Cranial nerves: II-XII intact, grossly non-focal.  Psychiatric: Alert and oriented, thought content appropriate, normal insight  Capillary Refill: Brisk, 3 seconds, normal   Peripheral Pulses: +2 palpable, equal bilaterally       Labs:   Recent Labs     09/25/22  1220 09/26/22  0443   WBC 5.6 6.7   HGB 14.9 13.3*   HCT 45.4 40.2*    205     Recent Labs     09/25/22  1220 09/26/22  0443    139   K 4.1 3.7    104   CO2 25 24   BUN 13 12   CREATININE 1.0 0.8*   CALCIUM 9.8 9.1     Recent Labs     09/25/22  1220   AST 20   ALT 28   BILITOT 0.6   ALKPHOS 131*     No results for input(s): INR in the last 72 hours. Recent Labs     09/25/22  1510 09/25/22  1919 09/26/22  0443   TROPONINI 0.04* 0.14* 1.94*       Urinalysis:      Lab Results   Component Value Date/Time    NITRU Negative 07/29/2022 08:21 PM    BLOODU Negative 07/29/2022 08:21 PM    SPECGRAV >=1.030 07/29/2022 08:21 PM    GLUCOSEU Negative 07/29/2022 08:21 PM       Radiology:  CTA CHEST ABDOMEN PELVIS W CONTRAST   Final Result   1. CHEST: No acute vascular abnormality is identified. There is no thoracic   aortic dissection or aneurysm. 2. Status post sternotomy. No parasternal fluid collection or complication   is evident. 3. No acute cardiopulmonary disease. 4. ABDOMEN PELVIS: No acute vascular abnormality of the abdomen or pelvis. 5. 3.2 cm aneurysm of the infrarenal abdominal aorta. 3 year follow-up   imaging is recommended. 6. No acute abdominopelvic abnormality otherwise identified. 7. Cholelithiasis. Hepatic steatosis.       RECOMMENDATIONS: 3.2 cm infrarenal abdominal aortic aneurysm. Recommend follow-up every 3   years. Reference: J Am Romi Radiol 2664;84:[id number]. XR CHEST PORTABLE   Final Result   No acute abnormality. Assessment/Plan:    Active Hospital Problems    Diagnosis     Elevated troponin [R77.8]      Priority: Medium     CAD status post CABG with NSTEMI:  EKG: New anterior ST-T changes. Troponin trended up to 1.94. Cardiology consulted: Status post emergent [facility] 9/25/2022 with PCI of LM into LCx and OM1. Currently on aspirin and Brilinta, therapeutic Lovenox. Continue Toprol XL. Not on statin due to allergy. Follow up A1c and lipid panel. DVT Prophylaxis: Therapeutic Lovenox  Diet: ADULT DIET; Regular  Code Status: Full Code  PT/OT Eval Status: Independent    Dispo -Home. Anticipate DC tomorrow.       Jean Marie Feliz MD

## 2022-09-26 NOTE — PROGRESS NOTES
[address]  Daily Progress Note    Cardiologist Iesha Poe   Admit 9/25/2022   CC CAD, HTN, CHOL   Subjective Doing well this AM.  No cp, sob. Exam BP 95/73   Pulse 63   Temp 97.1 °F (36.2 °C) (Temporal)   Resp 17   Ht 6' 1\" (1.854 m)   Wt 220 lb (99.8 kg)   SpO2 96%   PF (!) 18 L/min   BMI 29.03 kg/m²    Intake/Output Summary (Last 24 hours) at 9/26/2022 0748  Last data filed at 9/25/2022 1755  Gross per 24 hour   Intake 240 ml   Output --   Net 240 ml     Gen Alert, coop, no distress Heart  Rrr, no mrg   Head NC, AT, no abnorm Abd  Soft, NT, +BS, no mass, no OM   Eyes PER, conj/corn clear Ext  Ext nl, AT, no C/C/E   Nose Nares nl, no drain, NT Pulse 2+ and symmetric   Throat Lips, mucosa, tongue nl Skin Col/text/turg nl, no vis rash/les   Neck S/S, TM, NT, no bruit/JVD Psych Nl mood and affect   Lung CTA-B, unlabored, no DTP Lymph   No cervical or axillary LA   Ch wall NT, no deform Neuro  Nl gross M/S exam      Medications  aspirin EC  81 mg Oral Daily    metoprolol tartrate  25 mg Oral BID    pantoprazole  40 mg Oral Daily    sodium chloride flush  5-40 mL IntraVENous 2 times per day    enoxaparin  1 mg/kg SubCUTAneous BID    ticagrelor  90 mg Oral BID      sodium chloride        Labs Relevant and available CV data reviewed   Telemetry Reviewed   Time More than 35 minutes spent reviewing patient chart (including but not limited to notes, labs, imaging and other testing), interviewing patient and/or family members, performing a physical exam, documentation of my findings above and subsequent follow-up of ordered testing. More than 50% of that time spent face to face with patient discussing clinical condition and counseling regarding treatment plan.        Assessment and Plan  *CAD   Date EF Results   Sx   No concerning   LHC 9/22  Failed SVG-OM, PCI of LM into Cx and OM1   Plan   Continue asa, brilinta, bb, zetia  Allergic to statins  Will start injectable as outpatient   *HTN  Status Controlled   Plan Counseled on diet/salt/exercise/weight, continue meds at doses above   *CHOL     Plan Counseled on diet/exercise/weight, continue HI/MT statin

## 2022-09-26 NOTE — H&P
HOSPITALISTS HISTORY AND PHYSICAL    9/25/2022 8:37 PM    Patient Information:  Lisa Gregory is a 47 y.o. male [id number]  PCP:  Gregor REYES (Tel: [phone] )    Chief complaint:    Chief Complaint   Patient presents with    Chest Pain     Since last night. Hurts to move   + cough        History of Present Illness:  Leland Odom Sr. is a 47 y.o. male with h/o CAD, CABG, HTN, Nicotine use, presented with c/o chest pain , cough on going since last night . The pt is s/p CABG on 08/5/22. He is on Plavix and ASA . ED work up showed elevated troponin 0.02. EKG showed non - specific ST.t Changes  Troponin is trending up to 0.04---> 0.014  Cardiology Dr. Cece Moseley has been notified. Who plans tot take pt to Cath lab tonight    REVIEW OF SYSTEMS:   Constitutional: Negative for fever,chills or night sweats  ENT: Negative for rhinorrhea, epistaxis, hoarseness, sore throat. Respiratory: Negative for shortness of breath,wheezing  Cardiovascular: +Ve  for chest pain, palpitations   Gastrointestinal: Negative for nausea, vomiting, diarrhea  Genitourinary: Negative for polyuria, dysuria   Hematologic/Lymphatic: Negative for bleeding tendency, easy bruising  Musculoskeletal: Negative for myalgias and arthralgias  Neurologic: Negative for confusion,dysarthria. Skin: Negative for itching,rash  Psychiatric: Negative for depression,anxiety, agitation. Endocrine: Negative for polydipsia,polyuria,heat /cold intolerance. Past Medical History:   has a past medical history of Hypertension and MI (mitral incompetence). Past Surgical History:   has a past surgical history that includes Coronary angioplasty with stent; Appendectomy; Nasal septum surgery (11/2014); LASIK (11/15/2020); back surgery; and Coronary artery bypass graft (N/A, 8/1/2022).      Medications:  No current facility-administered medications on file prior to encounter. Current Outpatient Medications on File Prior to Encounter   Medication Sig Dispense Refill    aspirin EC 81 MG EC tablet Take 1 tablet by mouth daily 90 tablet 1    clopidogrel (PLAVIX) 75 MG tablet Take 1 tablet by mouth daily 90 tablet 1    metoprolol tartrate (LOPRESSOR) 25 MG tablet Take 1 tablet by mouth in the morning and 1 tablet before bedtime. 60 tablet 3    potassium chloride (KLOR-CON M) 20 MEQ TBCR extended release tablet Take 1 tablet by mouth in the morning for 7 days. 7 tablet 0    furosemide (LASIX) 20 MG tablet Take 1 tablet by mouth in the morning for 7 days. 7 tablet 0    ezetimibe (ZETIA) 10 MG tablet Take 1 tablet by mouth in the morning. 30 tablet 3    pantoprazole (PROTONIX) 40 MG tablet Take 40 mg by mouth daily      clonazePAM (KLONOPIN) 0.5 MG tablet Take 0.5 mg by mouth 2 times daily as needed.        Current Facility-Administered Medications   Medication Dose Route Frequency Provider Last Rate Last Admin    [START ON 9/26/2022] aspirin EC tablet 81 mg  81 mg Oral Daily Ayesha Maurer MD        clonazePAM Mahesh Pila) tablet 0.5 mg  0.5 mg Oral BID PRN Ayesha Maurer MD   0.5 mg at 09/25/22 1648    clopidogrel (PLAVIX) tablet 75 mg  75 mg Oral Daily Ayesha Maurer MD   75 mg at 09/25/22 1648    metoprolol tartrate (LOPRESSOR) tablet 25 mg  25 mg Oral BID MD Salomon Lopez ON 9/26/2022] pantoprazole (PROTONIX) tablet 40 mg  40 mg Oral Daily Candace Granados MD        sodium chloride flush 0.9 % injection 5-40 mL  5-40 mL IntraVENous 2 times per day Ayesha Maurer MD        sodium chloride flush 0.9 % injection 5-40 mL  5-40 mL IntraVENous PRN Candace Granados MD        0.9 % sodium chloride infusion   IntraVENous PRN Ayehsa Maurer MD        ondansetron (ZOFRAN-ODT) disintegrating tablet 4 mg  4 mg Oral Q8H PRN Ayesha Maurer MD        Or    ondansetron (ZOFRAN) injection 4 mg  4 mg IntraVENous Q6H PRN Ayesha Maurer MD        polyethylene glycol ([facility]) packet 17 g  17 g Oral Daily PRN Liz Lopez MD        acetaminophen (TYLENOL) tablet 650 mg  650 mg Oral Q6H PRN Liz Lopez MD        Or    acetaminophen (TYLENOL) suppository 650 mg  650 mg Rectal Q6H PRN Liz Lopez MD        morphine (PF) injection 2 mg  2 mg IntraVENous Q4H PRN Liz Lopez MD   2 mg at 09/25/22 1725    enoxaparin (LOVENOX) injection 100 mg  1 mg/kg SubCUTAneous BID Candace Granados MD        diazePAM (VALIUM) injection 5 mg  5 mg IntraVENous Q4H PRN Liz Lopez MD   5 mg at 09/25/22 1908    cyclobenzaprine (FLEXERIL) tablet 5 mg  5 mg Oral BID PRN Liz Lopez MD        melatonin tablet 3 mg  3 mg Oral Nightly PRN Liz Lopez MD            Allergies: Allergies   Allergen Reactions    Fenofibrate     Statins      Crestor, pravachol        Social History:  Patient Lives    reports that he quit smoking about 2 months ago. His smoking use included cigarettes. He smoked an average of .25 packs per day. He has never used smokeless tobacco. He reports current drug use. Drug: Marijuana Maurita Handsome). He reports that he does not drink alcohol. Family History:  family history includes Diabetes in his father and mother; Heart Disease in his father; High Blood Pressure in his father and mother; High Cholesterol in his father and mother. ,     Physical Exam:  BP (!) 153/69   Pulse 72   Temp 98 °F (36.7 °C) (Oral)   Resp 16   Ht 6' 1\" (1.854 m)   Wt 220 lb (99.8 kg)   SpO2 97%   PF (!) 18 L/min   BMI 29.03 kg/m²     General appearance:  Appears comfortable. Well nourished  Eyes: Sclera clear, pupils equal  ENT: Moist mucus membranes, no thrush. Trachea midline. Cardiovascular: Regular rhythm, normal S1, S2. No murmur, gallop, rub.  No edema in lower extremities  Respiratory: Clear to auscultation bilaterally, no wheeze, good inspiratory effort  Gastrointestinal: Abdomen soft, non-tender, not distended, normal bowel sounds  Musculoskeletal: No cyanosis in digits, neck supple  Neurology: Cranial nerves grossly intact. Alert and oriented in time, place and person. No speech or motor deficits  Psychiatry: Appropriate affect. Not agitated  Skin: Warm, dry, normal turgor, no rash  Brisk capillary refill, peripheral pulses palpable   Labs:  CBC:   Lab Results   Component Value Date/Time    WBC 5.6 09/25/2022 12:20 PM    RBC 5.28 09/25/2022 12:20 PM    HGB 14.9 09/25/2022 12:20 PM    HCT 45.4 09/25/2022 12:20 PM    MCV 85.9 09/25/2022 12:20 PM    MCH 28.3 09/25/2022 12:20 PM    MCHC 32.9 09/25/2022 12:20 PM    RDW 16.2 09/25/2022 12:20 PM     09/25/2022 12:20 PM    MPV 7.2 09/25/2022 12:20 PM     BMP:    Lab Results   Component Value Date/Time     09/25/2022 12:20 PM    K 4.1 09/25/2022 12:20 PM    K 3.9 07/30/2022 05:38 AM     09/25/2022 12:20 PM    CO2 25 09/25/2022 12:20 PM    BUN 13 09/25/2022 12:20 PM    CREATININE 1.0 09/25/2022 12:20 PM    CALCIUM 9.8 09/25/2022 12:20 PM    GFRAA >60 09/25/2022 12:20 PM    GFRAA >60 06/23/2012 05:25 AM    LABGLOM >60 09/25/2022 12:20 PM    GLUCOSE 109 09/25/2022 12:20 PM     CTA CHEST ABDOMEN PELVIS W CONTRAST   Final Result   1. CHEST: No acute vascular abnormality is identified. There is no thoracic   aortic dissection or aneurysm. 2. Status post sternotomy. No parasternal fluid collection or complication   is evident. 3. No acute cardiopulmonary disease. 4. ABDOMEN PELVIS: No acute vascular abnormality of the abdomen or pelvis. 5. 3.2 cm aneurysm of the infrarenal abdominal aorta. 3 year follow-up   imaging is recommended. 6. No acute abdominopelvic abnormality otherwise identified. 7. Cholelithiasis. Hepatic steatosis. RECOMMENDATIONS:   3.2 cm infrarenal abdominal aortic aneurysm. Recommend follow-up every 3   years. Reference: J Am Romi Radiol 8426;19:730-629. XR CHEST PORTABLE   Final Result   No acute abnormality.            Chest Xray:   EKG:    I visualized CXR images and EKG strips    Discussed case  with Problem List  Principal Problem:    Elevated troponin  Resolved Problems:    * No resolved hospital problems. *        Assessment/Plan:   Elevated troponin   Trending up 0.02 - > 0.04-- > 0.14  Serial EKG showed non specific ST.t changes  AC with full dose Lovenox  Cont on Plavix  Cardiology plans for emergent Angiogram tonight       DVT prophylaxis Lovneox  Code status   Diet   IV access   Parra Catheter    Admit as obs I anticipate hospitalization spanning less/ than two midnights for investigation and treatment of the above medically necessary diagnoses. Please note that some part of this chart was generated using Dragon dictation software. Although every effort was made to ensure the accuracy of this automated transcription, some errors in transcription may have occurred inadvertently. If you may need any clarification, please do not hesitate to contact me through [facility].        Mis Velasquez MD    9/25/2022 8:37 PM

## 2022-09-26 NOTE — PROGRESS NOTES
Patient brought over to CVU from cath lab and attached to CVU monitoring. Report reviewed with cath lab RN. Right groin soft and no hematoma or oozing noted, sheath in place. Pedal pulses easily palpated. Primary RN Dre Estevez

## 2022-09-27 ENCOUNTER — TELEPHONE (OUTPATIENT)
Dept: CARDIOLOGY CLINIC | Age: 52
End: 2022-09-27

## 2022-09-27 VITALS
HEIGHT: 73 IN | DIASTOLIC BLOOD PRESSURE: 96 MMHG | RESPIRATION RATE: 17 BRPM | TEMPERATURE: 97.5 F | OXYGEN SATURATION: 95 % | WEIGHT: 213.41 LBS | HEART RATE: 97 BPM | BODY MASS INDEX: 28.28 KG/M2 | SYSTOLIC BLOOD PRESSURE: 131 MMHG

## 2022-09-27 PROBLEM — I25.10 CAD (CORONARY ARTERY DISEASE): Status: ACTIVE | Noted: 2022-09-27

## 2022-09-27 PROBLEM — E78.5 HYPERLIPIDEMIA: Status: ACTIVE | Noted: 2018-09-07

## 2022-09-27 LAB
A/G RATIO: 1 (ref 1.1–2.2)
ALBUMIN SERPL-MCNC: 4 G/DL (ref 3.4–5)
ALP BLD-CCNC: 128 U/L (ref 40–129)
ALT SERPL-CCNC: 26 U/L (ref 10–40)
ANION GAP SERPL CALCULATED.3IONS-SCNC: 13 MMOL/L (ref 3–16)
AST SERPL-CCNC: 35 U/L (ref 15–37)
BILIRUB SERPL-MCNC: 1.2 MG/DL (ref 0–1)
BUN BLDV-MCNC: 12 MG/DL (ref 7–20)
CALCIUM SERPL-MCNC: 9.8 MG/DL (ref 8.3–10.6)
CHLORIDE BLD-SCNC: 100 MMOL/L (ref 99–110)
CHOLESTEROL, TOTAL: 196 MG/DL (ref 0–199)
CO2: 23 MMOL/L (ref 21–32)
CREAT SERPL-MCNC: 0.9 MG/DL (ref 0.9–1.3)
ESTIMATED AVERAGE GLUCOSE: 96.8 MG/DL
GFR AFRICAN AMERICAN: >60
GFR NON-AFRICAN AMERICAN: >60
GLUCOSE BLD-MCNC: 103 MG/DL (ref 70–99)
HBA1C MFR BLD: 5 %
HCT VFR BLD CALC: 42.7 % (ref 40.5–52.5)
HDLC SERPL-MCNC: 25 MG/DL (ref 40–60)
HEMOGLOBIN: 14.1 G/DL (ref 13.5–17.5)
LDL CHOLESTEROL CALCULATED: 111 MG/DL
LV EF: 48 %
LVEF MODALITY: NORMAL
MCH RBC QN AUTO: 28.2 PG (ref 26–34)
MCHC RBC AUTO-ENTMCNC: 33.2 G/DL (ref 31–36)
MCV RBC AUTO: 85.1 FL (ref 80–100)
PDW BLD-RTO: 16.8 % (ref 12.4–15.4)
PLATELET # BLD: 209 K/UL (ref 135–450)
PMV BLD AUTO: 7 FL (ref 5–10.5)
POC ACT LR: 154 SEC
POC ACT LR: 184 SEC
POTASSIUM SERPL-SCNC: 3.8 MMOL/L (ref 3.5–5.1)
RBC # BLD: 5.01 M/UL (ref 4.2–5.9)
SODIUM BLD-SCNC: 136 MMOL/L (ref 136–145)
TOTAL PROTEIN: 7.9 G/DL (ref 6.4–8.2)
TRIGL SERPL-MCNC: 299 MG/DL (ref 0–150)
VLDLC SERPL CALC-MCNC: 60 MG/DL
WBC # BLD: 6.6 K/UL (ref 4–11)

## 2022-09-27 PROCEDURE — 6370000000 HC RX 637 (ALT 250 FOR IP): Performed by: INTERNAL MEDICINE

## 2022-09-27 PROCEDURE — 80061 LIPID PANEL: CPT

## 2022-09-27 PROCEDURE — 6360000002 HC RX W HCPCS: Performed by: FAMILY MEDICINE

## 2022-09-27 PROCEDURE — 83036 HEMOGLOBIN GLYCOSYLATED A1C: CPT

## 2022-09-27 PROCEDURE — 6370000000 HC RX 637 (ALT 250 FOR IP): Performed by: FAMILY MEDICINE

## 2022-09-27 PROCEDURE — 99232 SBSQ HOSP IP/OBS MODERATE 35: CPT | Performed by: INTERNAL MEDICINE

## 2022-09-27 PROCEDURE — 85027 COMPLETE CBC AUTOMATED: CPT

## 2022-09-27 PROCEDURE — C8929 TTE W OR WO FOL WCON,DOPPLER: HCPCS

## 2022-09-27 PROCEDURE — 80053 COMPREHEN METABOLIC PANEL: CPT

## 2022-09-27 PROCEDURE — 94760 N-INVAS EAR/PLS OXIMETRY 1: CPT

## 2022-09-27 RX ADMIN — CLONAZEPAM 0.5 MG: 0.5 TABLET ORAL at 06:32

## 2022-09-27 RX ADMIN — ENOXAPARIN SODIUM 100 MG: 100 INJECTION SUBCUTANEOUS at 09:09

## 2022-09-27 RX ADMIN — PANTOPRAZOLE SODIUM 40 MG: 40 TABLET, DELAYED RELEASE ORAL at 06:31

## 2022-09-27 RX ADMIN — ASPIRIN 81 MG: 81 TABLET, COATED ORAL at 09:10

## 2022-09-27 RX ADMIN — TICAGRELOR 90 MG: 90 TABLET ORAL at 09:10

## 2022-09-27 RX ADMIN — METOPROLOL TARTRATE 25 MG: 25 TABLET, FILM COATED ORAL at 09:10

## 2022-09-27 RX ADMIN — MELATONIN TAB 3 MG 3 MG: 3 TAB at 00:47

## 2022-09-27 ASSESSMENT — PAIN SCALES - GENERAL
PAINLEVEL_OUTOF10: 0
PAINLEVEL_OUTOF10: 0

## 2022-09-27 NOTE — PROGRESS NOTES
[address]  Daily Progress Note    Cardiologist Dagoberto Yap   Admit 9/25/2022   CC CAD, HTN, CHOL   Subjective Doing well this AM.  No cp, sob. Sleeping soundly. Exam BP (!) 131/96   Pulse 97   Temp 97.5 °F (36.4 °C) (Temporal)   Resp 17   Ht 6' 1\" (1.854 m)   Wt 213 lb 6.5 oz (96.8 kg)   SpO2 98%   PF (!) 16 L/min   BMI 28.16 kg/m²  No intake or output data in the 24 hours ending 09/27/22 0718    Gen Alert, coop, no distress Heart  Rrr, no mrg   Head NC, AT, no abnorm Abd  Soft, NT, +BS, no mass, no OM   Eyes PER, conj/corn clear Ext  Ext nl, AT, no C/C/E   Nose Nares nl, no drain, NT Pulse 2+ and symmetric   Throat Lips, mucosa, tongue nl Skin Col/text/turg nl, no vis rash/les   Neck S/S, TM, NT, no bruit/JVD Psych Nl mood and affect   Lung CTA-B, unlabored, no DTP Lymph   No cervical or axillary LA   Ch wall NT, no deform Neuro  Nl gross M/S exam      Medications  aspirin EC  81 mg Oral Daily    metoprolol tartrate  25 mg Oral BID    pantoprazole  40 mg Oral Daily    sodium chloride flush  5-40 mL IntraVENous 2 times per day    enoxaparin  1 mg/kg SubCUTAneous BID    ticagrelor  90 mg Oral BID      sodium chloride        Labs Relevant and available CV data reviewed   Telemetry Reviewed   Time More than 35 minutes spent reviewing patient chart (including but not limited to notes, labs, imaging and other testing), interviewing patient and/or family members, performing a physical exam, documentation of my findings above and subsequent follow-up of ordered testing. More than 50% of that time spent face to face with patient discussing clinical condition and counseling regarding treatment plan.        Assessment and Plan  *CAD   Date EF Results   Sx   No concerning   LHC 9/22  Failed SVG-OM, PCI of LM into Cx and OM1   Plan   Continue asa, brilinta, bb, zetia  Allergic to statins, will start injectable as outpatient  Ok to discharge today after echo resulted   *HTN  Status Controlled   Plan Counseled on diet/salt/exercise/weight, continue meds at doses above   *CHOL     Plan Counseled on diet/exercise/weight, continue HI/MT statin

## 2022-09-27 NOTE — PROGRESS NOTES
CLINICAL PHARMACY NOTE: MEDS TO BEDS    Total # of Prescriptions Filled: 1   The following medications were delivered to the patient:  Brilinta 90 mg    Additional Documentation:  Delivered to Patient=Signed  Ok to be delivered per [employer] Donato Ríos

## 2022-09-27 NOTE — CARE COORDINATION
Patient discharged 9/27.2022  to home   All discharge needs met per case management     Abdi Valladares RN, BSN  [phone]

## 2022-09-27 NOTE — DISCHARGE SUMMARY
[address] [facility] DISCHARGE SUMMARY    Patient Demographics    Patient. Jaime Simpson Date of Birth. 1970  MRN. [id number]     Primary care provider. Delia REYES  (Tel: [phone])    Admit date: 9/25/2022    Discharge date (blank if same as Note Date): Note Date: 9/27/2022     Reason for Hospitalization. Chief Complaint   Patient presents with    Chest Pain     Since last night. Hurts to move   + cough         Significant Findings. Principal Problem:    NSTEMI (non-ST elevated myocardial infarction) ([address]  Active Problems:    HTN (hypertension)    Elevated troponin    CAD s/p CABG and PCI    Hyperlipidemia  Resolved Problems:    * No resolved hospital problems. *       Problems and results from this hospitalization that need follow up. CAD s/p PCI and CABG  Hyperlipidemia    Significant test results and incidental findings. CTA CHEST ABDOMEN PELVIS W CONTRAST   Final Result   1. CHEST: No acute vascular abnormality is identified. There is no thoracic   aortic dissection or aneurysm. 2. Status post sternotomy. No parasternal fluid collection or complication   is evident. 3. No acute cardiopulmonary disease. 4. ABDOMEN PELVIS: No acute vascular abnormality of the abdomen or pelvis. 5. 3.2 cm aneurysm of the infrarenal abdominal aorta. 3 year follow-up   imaging is recommended. 6. No acute abdominopelvic abnormality otherwise identified. 7. Cholelithiasis. Hepatic steatosis. RECOMMENDATIONS:   3.2 cm infrarenal abdominal aortic aneurysm. Recommend follow-up every 3   years. Reference: J Am Romi Radiol 3148;89:[id number]. XR CHEST PORTABLE   Final Result   No acute abnormality. Invasive procedures and treatments. 9/26/2022: [facility] 9/25/2022 with failed SVG-OM, PCI of LM into LCx and OM1. [facility] Course.   Jaime Simpson is a 47 y.o. male with h/o CAD, s/p CABG 8/5/2022 on aspirin and Plavix, HTN, Nicotine use, presented with c/o chest pain and cough on going since last night. ED work up showed elevated troponin 0.02. EKG new anterior ST-T changes. Troponin trended up from 0.04---> 0.14, peak 1.94. He was taken to the Cath Lab with PCI of LM into LCx and OM1. CAD s/p CABG with NSTEMI:  EKG: New anterior ST-T changes. Troponin trended up to 1.94. Cardiology consulted: Status post emergent LHC 9/25/2022 with failed SVG-OM, PCI of LM into LCx and OM1. Discharged on aspirin and Brilinta, Zetia and Toprol-XL. Not on statin due to allergy. Outpatient follow-up with PCP/cardiology for injectables. A1c 5.5% 7/29/2022. Echocardiogram 9/27/2022: LVEF 45 to 50% with hypokinesis of the apex distal anteroseptum and mid anterolateral walls. G1 DD with normal LV filling pressures. Discharged home with outpatient follow-up with cardiology in 1 week. Consults. IP CONSULT TO CARDIOLOGY  IP CONSULT TO CARDIAC REHAB  IP CONSULT TO CARDIAC REHAB    Physical examination on discharge day. BP (!) 131/96   Pulse 97   Temp 97.5 °F (36.4 °C) (Temporal)   Resp 17   Ht 6' 1\" (1.854 m)   Wt 213 lb 6.5 oz (96.8 kg)   SpO2 95%   PF (!) 16 L/min   BMI 28.16 kg/m²   General appearance. Alert. Looks comfortable. HEENT. Sclera clear. Moist mucus membranes. Cardiovascular. Regular rate and rhythm, normal S1, S2. No murmur. Respiratory. Not using accessory muscles. Clear to auscultation bilaterally, no wheeze. Gastrointestinal. Abdomen soft, non-tender, not distended, normal bowel sounds  Neurology. Facial symmetry. No speech deficits. Moving all extremities equally. Extremities. No edema in lower extremities. Skin. Warm, dry, normal turgor        Condition at time of discharge: stable. Medication instructions provided to patient at discharge.      Medication List        START taking these medications      ticagrelor 90 MG Tabs tablet  Commonly known as: BRILINTA  Take 1 tablet by mouth 2 times daily CONTINUE taking these medications      aspirin EC 81 MG EC tablet  Take 1 tablet by mouth daily     clonazePAM 0.5 MG tablet  Commonly known as: KLONOPIN     ezetimibe 10 MG tablet  Commonly known as: ZETIA  Take 1 tablet by mouth in the morning. metoprolol tartrate 25 MG tablet  Commonly known as: LOPRESSOR  Take 1 tablet by mouth in the morning and 1 tablet before bedtime. pantoprazole 40 MG tablet  Commonly known as: PROTONIX            STOP taking these medications      clopidogrel 75 MG tablet  Commonly known as: Plavix     furosemide 20 MG tablet  Commonly known as: Lasix     potassium chloride 20 MEQ Tbcr extended release tablet  Commonly known as: KLOR-CON M               Where to Get Your Medications        These medications were sent to [facility], [address] [place] [address], [address]      Phone: [phone]   ticagrelor 90 MG Tabs tablet         Discharge recommendations given to patient. Follow Up. With Cardiology in 1 week   Disposition. home  Activity. activity as tolerated  Diet: ADULT DIET; Regular; Low Fat/Low Chol/High Fiber/2 gm Na      Spent 35 minutes in discharge process.     Signed:  Reymundo Stevens MD     9/27/2022 11:35 AM

## 2022-10-04 ENCOUNTER — TELEPHONE (OUTPATIENT)
Dept: CARDIOLOGY CLINIC | Age: 52
End: 2022-10-04

## 2022-10-04 ENCOUNTER — OFFICE VISIT (OUTPATIENT)
Dept: CARDIOLOGY CLINIC | Age: 52
End: 2022-10-04

## 2022-10-04 VITALS
BODY MASS INDEX: 30.15 KG/M2 | HEART RATE: 73 BPM | OXYGEN SATURATION: 98 % | SYSTOLIC BLOOD PRESSURE: 130 MMHG | HEIGHT: 73 IN | WEIGHT: 227.5 LBS | DIASTOLIC BLOOD PRESSURE: 70 MMHG

## 2022-10-04 DIAGNOSIS — I10 PRIMARY HYPERTENSION: ICD-10-CM

## 2022-10-04 DIAGNOSIS — I25.10 CORONARY ARTERY DISEASE INVOLVING NATIVE CORONARY ARTERY OF NATIVE HEART WITHOUT ANGINA PECTORIS: Primary | ICD-10-CM

## 2022-10-04 DIAGNOSIS — E78.2 MIXED HYPERLIPIDEMIA: ICD-10-CM

## 2022-10-04 PROCEDURE — 99215 OFFICE O/P EST HI 40 MIN: CPT | Performed by: NURSE PRACTITIONER

## 2022-10-04 RX ORDER — EZETIMIBE 10 MG/1
10 TABLET ORAL DAILY
Qty: 90 TABLET | Refills: 2 | Status: SHIPPED | OUTPATIENT
Start: 2022-10-04

## 2022-10-04 NOTE — PROGRESS NOTES
[facility]     Outpatient Follow Up Note    CHIEF COMPLAINT / HPI: Hospital Follow Up secondary to status post coronary angioplasty    Hospital record has been reviewed : 9/25 - 9/27/22  Hospital Course progressed as follows per discharge summary:     Zander Luevano Sr. is a 47 y.o. male with h/o CAD, s/p CABG 8/5/2022 on aspirin and Plavix, HTN, Nicotine use  ~presented with c/o chest pain and cough on going since last night. ED work up showed elevated troponin 0.02. EKG new anterior ST-T changes. Troponin trended up from 0.04---> 0.14, peak 1.94. He was taken to the Cath Lab with PCI of LM into LCx and OM1. CAD s/p CABG with NSTEMI:  EKG: New anterior ST-T changes. Troponin trended up to 1.94. Cardiology consulted: Status post emergent C 9/25/2022 with failed SVG-OM, PCI of LM into LCx and OM1. Discharged on aspirin and Brilinta, Zetia and Toprol-XL. Not on statin due to allergy. Outpatient follow-up with PCP/cardiology for injectables. A1c 5.5% 7/29/2022. Echocardiogram 9/27/2022: LVEF 45 to 50% with hypokinesis of the apex distal anteroseptum and mid anterolateral walls. G1 DD with normal LV filling pressures. Discharged home with outpatient follow-up with cardiology in 1 week. Steph Cosme is 47 y.o. male who presents today for a routine follow up after a recent hospitalization related to the above mentioned issues. He recalls having CP / jaw pain. Subjective:   Since the time of discharge, the patient admits their symptoms have improved. He hasn't done much / afraid. He still feels his chest tweaking / inc healing     He denies significant chest pain. There is no SOB/RESENDEZ. The patient is not experiencing palpitations. These symptoms are improving over the last few days. With regard to medication therapy the patient has been compliant with prescribed regimen. They have tolerated therapy to date.      Past Medical History:   Diagnosis Date    Hypertension     MI (mitral incompetence)      Social History:    Social History     Tobacco Use   Smoking Status Former    Packs/day: 0.25    Types: Cigarettes    Quit date: 2022    Years since quittin.2   Smokeless Tobacco Never   Tobacco Comments    1 ppd     Current Medications:  Current Outpatient Medications   Medication Sig Dispense Refill    ticagrelor (BRILINTA) 90 MG TABS tablet Take 1 tablet by mouth 2 times daily 60 tablet 5    aspirin EC 81 MG EC tablet Take 1 tablet by mouth daily 90 tablet 1    metoprolol tartrate (LOPRESSOR) 25 MG tablet Take 1 tablet by mouth in the morning and 1 tablet before bedtime. 60 tablet 3    ezetimibe (ZETIA) 10 MG tablet Take 1 tablet by mouth in the morning. 30 tablet 3    pantoprazole (PROTONIX) 40 MG tablet Take 40 mg by mouth daily      clonazePAM (KLONOPIN) 0.5 MG tablet Take 0.5 mg by mouth 2 times daily as needed. No current facility-administered medications for this visit. REVIEW OF SYSTEMS:   CONSTITUTIONAL: No major weight gain or loss, fatigue, weakness, night sweats or fever. There's been no change in energy level, sleep pattern, or activity level. HEENT: No new vision difficulties or ringing in the ears. RESPIRATORY: No new SOB, PND, orthopnea or cough. CARDIOVASCULAR: See HPI  GI: No nausea, vomiting, diarrhea, constipation, abdominal pain or changes in bowel habits. : No urinary frequency, urgency, incontinence hematuria or dysuria. SKIN: No cyanosis or skin lesions. MUSCULOSKELETAL: No new muscle or joint pain. NEUROLOGICAL: No syncope or TIA-like symptoms.   PSYCHIATRIC: No anxiety, pain, insomnia or depression    Objective:   PHYSICAL EXAM:        VITALS:  /70 (Site: Right Upper Arm, Position: Sitting, Cuff Size: Medium Adult)   Pulse 73   Ht 6' 1\" (1.854 m)   Wt 227 lb 8 oz (103.2 kg)   SpO2 98%   BMI 30.02 kg/m²     CONSTITUTIONAL: Cooperative, no apparent distress, and appears well nourished / developed  NEUROLOGIC:  Awake and orientated to person, place and time. PSYCH: Calm affect; upset / near tearful. SKIN: Warm and dry. HEENT: Sclera non-icteric, normocephalic, neck supple, no elevation of JVP, normal carotid pulses with no bruits and thyroid normal size. LUNGS:  No increased work of breathing and clear to auscultation, no crackles or wheezing. CARDIOVASCULAR:  Regular rate 64 and rhythm with no murmurs, gallops, rubs, or abnormal heart sounds, normal PMI. The apical impulses not displaced. Heart tones are crisp and normal                                                                                          Cervical veins are not engorged                 JVP less than 8 cm H2O                                                                              The carotid upstroke is normal in amplitude and contour without delay or bruit    ABDOMEN:  Normal bowel sounds, non-distended and non-tender to palpation   EXT: No edema, no calf tenderness. Pulses are present bilaterally.     DATA:    Lab Results   Component Value Date    ALT 26 09/27/2022    AST 35 09/27/2022    ALKPHOS 128 09/27/2022    BILITOT 1.2 (H) 09/27/2022     Lab Results   Component Value Date    CREATININE 0.9 09/27/2022    BUN 12 09/27/2022     09/27/2022    K 3.8 09/27/2022     09/27/2022    CO2 23 09/27/2022     No results found for: TSH, A8GHZBD, K7MBAQW, THYROIDAB  Lab Results   Component Value Date    WBC 6.6 09/27/2022    HGB 14.1 09/27/2022    HCT 42.7 09/27/2022    MCV 85.1 09/27/2022     09/27/2022     No components found for: CHLPL  Lab Results   Component Value Date    TRIG 299 (H) 09/27/2022    TRIG 479 (H) 07/29/2022    TRIG 256 (H) 11/01/2016     Lab Results   Component Value Date    HDL 25 (L) 09/27/2022    HDL 18 (L) 07/29/2022    HDL 22 (L) 11/01/2016     Lab Results   Component Value Date    LDLCALC 111 (H) 09/27/2022    LDLCALC see below 07/29/2022    LDLCALC 107 (H) 11/01/2016     Lab Results Component Value Date    LABVLDL 60 09/27/2022    LABVLDL see below 07/29/2022    LABVLDL 51 11/01/2016     Radiology Review:  Pertinent images / reports were reviewed as a part of this visit and reveals the following:    Angiogram: Oct '10:  Diagnostic angiogram  confirmed the proximal eccentric 70-80% stenosis. A BMW wire was then placed and a 3.5 x 12  drug eluting [place] stent was primarily deployed to high pressure. This gave an excellent  result with no residual stenosis and normal flow downstream. There was some spasm distal to  the stent that was relieved with NTG. IMPRESSION: Successful drug eluting stent to the proximal RCA for multivessel coronary artery disease    Angiogram: 10/31/16:  ~PTCA mid-LAD 90% distal to a previously placed stent > 0  ~PTCA diag-1 80% > 0  ~prox-RCA stent patent  ~prox-LAD stent patent  ~EF 55%    Angiogram: 7/29/22:  Artery Findings/Result   LM 50% distal, eccentric   LAD 70% prox, patent mid stents, 60% mid   Cx OM1 70% ostial, OM3 100% with L-L collaterals   RI 50% mid   RCA 95% prox, 40% instent prox, 50% mid   LVEDP 18   LVG 65%   Post Cath Dx:       Admit to hospital  CABG - consider LAD, OM1, OM3, PDA +/- ramus although appears small     OR: 8/1/22: CABG CORONARY ARTERY BYPASS X 3 (LIMA GRAFT TO DISTAL LAD, VEIN GRAFT TO RCA, VEIN GRAFT TO OM1)   LIGATION DEJAH WITH 40MM ATRICLIP    Angiogram: 9/25/22  Findings  Artery Findings/Result   LM 90% distal   LAD 70% prox, patent mid stent, competitive mid flow from LIMA   Cx 90% ostial, OM1 100% mid   RI N/A   RCA 95% mid,70% mid, competitive distal flow   S-PDA patent   S-% prox   L-LAD Patent, 40% distal anastomotic tapering   LVEDP 6   LVG NA      Intervention(s)  Artery Location Intervention Result   OM1 Os-mid 2.5X28 overlapping prox 2.5X15 Xience KARLY No residual   LM-Cx Dis-prox 3.5X15 Xience KARLY (PD 3.5NC, prox to mid PD 4. 0NC) No residual   LM-Cx Dis-prox MLA 2.13, Cx prox 3.5, LM distal 4.0        Post Cath Dx:       Failed SVG-OM1  Attempted aspiration of SVG - significant clot burden and aborted  Successful PCI of native LM into Cx as well as OM1 (previously bypassed)      Assessment:      Diagnosis Orders   1. Coronary artery disease involving native coronary artery of native heart without angina pectoris   ~s/p PTCA LM-CX and OM-1  ~unsuccessful intervention SVG d/t extent of clot burden  ~hx CABG with DEJAH clip 8/1/22  ~hx PTCA LAD Sept with staged RCA Oct '10  ~s/p PTCA mid-LAD & diag-1 Oct '16       2. Mixed hyperlipidemia   ~not well controlled on zetia alone  ~A1c 5.0%       3. Primary hypertension   ~controlled            Patient  is stable since hospital discharge. Plan:  Being Repatha 140 mg SQ evdy 14 days   ~lipid profile/LFT before 5th dose   Cardiac rehab phase II   F/U with DCE 10/13 10:15 am      Consider RTW 10/24: reassess at next OV    I have addressed the patient's cardiac risk factors and adjusted pharmacologic treatment as needed. In addition, I have reinforced the need for patient directed risk factor modification. Further evaluation will be based upon the patient's clinical course and testing results. All questions and concerns were addressed to the patient. Alternatives to  treatment were discussed. The patient  currently  is smoking. The risks related to smoking were reviewed with the patient. Recommend maintaining a smoke-free lifestyle. Products available for smoking cessation were discussed. Dual Antiplatelet therapy  has been recommended / prescribed for this patient. Education conducted on adverse reactions including bleeding was discussed. The patient verbalizes understanding. Pt is on a BB  Pt is not on an ace-i/ARB  Pt is not on a statin  : intolerant . Taking zetia    Saturated fat diet discussed  Exercise program discussed    Thank you for allowing to us to participate in the care of Jefferson Esparza Sr. .      [facility]  Documentation of today's visit sent to PCP

## 2022-10-04 NOTE — PATIENT INSTRUCTIONS
Appointment with Dr. Anya Carmichael Oct 13th at 10:15 am    Begin Repatha 140 mg every 14 days  ~have your cholesterol levels recheck just before your 5 th dose

## 2022-10-04 NOTE — LETTER
[address] [address] [address] [address]  Phone: [phone]  Fax: [phone]    October 4, 2022     Patient: Lida Rosen YOB: 1970   Date of Visit: 10/4/2022       To Whom It May Concern: It is my medical opinion that Iver Gess should remain off work until Oct 24th      If you have any questions or concerns, please don't hesitate to call.     Sincerely,        Beth Valdez, AZALIA - CNP

## 2022-10-06 ENCOUNTER — HOSPITAL ENCOUNTER (OUTPATIENT)
Dept: CARDIAC REHAB | Age: 52
Setting detail: THERAPIES SERIES
Discharge: HOME | End: 2022-10-06
Payer: COMMERCIAL

## 2022-10-06 VITALS — RESPIRATION RATE: 16 BRPM | HEART RATE: 78 BPM | WEIGHT: 222.4 LBS | HEIGHT: 73 IN | BODY MASS INDEX: 29.48 KG/M2

## 2022-10-06 PROCEDURE — 93798 PHYS/QHP OP CAR RHAB W/ECG: CPT

## 2022-10-06 ASSESSMENT — PATIENT HEALTH QUESTIONNAIRE - PHQ9
5. POOR APPETITE OR OVEREATING: 0
7. TROUBLE CONCENTRATING ON THINGS, SUCH AS READING THE NEWSPAPER OR WATCHING TELEVISION: 3
8. MOVING OR SPEAKING SO SLOWLY THAT OTHER PEOPLE COULD HAVE NOTICED. OR THE OPPOSITE, BEING SO FIGETY OR RESTLESS THAT YOU HAVE BEEN MOVING AROUND A LOT MORE THAN USUAL: 0
1. LITTLE INTEREST OR PLEASURE IN DOING THINGS: 2
SUM OF ALL RESPONSES TO PHQ9 QUESTIONS 1 & 2: 5
SUM OF ALL RESPONSES TO PHQ QUESTIONS 1-9: 12
SUM OF ALL RESPONSES TO PHQ QUESTIONS 1-9: 12
10. IF YOU CHECKED OFF ANY PROBLEMS, HOW DIFFICULT HAVE THESE PROBLEMS MADE IT FOR YOU TO DO YOUR WORK, TAKE CARE OF THINGS AT HOME, OR GET ALONG WITH OTHER PEOPLE: 1
6. FEELING BAD ABOUT YOURSELF - OR THAT YOU ARE A FAILURE OR HAVE LET YOURSELF OR YOUR FAMILY DOWN: 1
2. FEELING DOWN, DEPRESSED OR HOPELESS: 3
SUM OF ALL RESPONSES TO PHQ QUESTIONS 1-9: 12
3. TROUBLE FALLING OR STAYING ASLEEP: 1
4. FEELING TIRED OR HAVING LITTLE ENERGY: 2
SUM OF ALL RESPONSES TO PHQ QUESTIONS 1-9: 12
9. THOUGHTS THAT YOU WOULD BE BETTER OFF DEAD, OR OF HURTING YOURSELF: 0

## 2022-10-06 ASSESSMENT — EJECTION FRACTION: EF_VALUE: 45
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
## 2022-10-07 ENCOUNTER — HOSPITAL ENCOUNTER (OUTPATIENT)
Dept: CARDIAC REHAB | Age: 52
Setting detail: THERAPIES SERIES
Discharge: HOME | End: 2022-10-07
Payer: COMMERCIAL

## 2022-10-07 PROCEDURE — 93798 PHYS/QHP OP CAR RHAB W/ECG: CPT

## 2022-10-10 ENCOUNTER — HOSPITAL ENCOUNTER (OUTPATIENT)
Dept: CARDIAC REHAB | Age: 52
Setting detail: THERAPIES SERIES
Discharge: HOME | End: 2022-10-10
Payer: COMMERCIAL

## 2022-10-10 PROCEDURE — 93798 PHYS/QHP OP CAR RHAB W/ECG: CPT

## 2022-10-11 ENCOUNTER — HOSPITAL ENCOUNTER (OUTPATIENT)
Dept: CARDIAC REHAB | Age: 52
Setting detail: THERAPIES SERIES
Discharge: HOME | End: 2022-10-11
Payer: COMMERCIAL

## 2022-10-11 ENCOUNTER — OFFICE VISIT (OUTPATIENT)
Dept: VASCULAR SURGERY | Age: 52
End: 2022-10-11
Payer: COMMERCIAL

## 2022-10-11 VITALS
HEIGHT: 73 IN | WEIGHT: 220 LBS | BODY MASS INDEX: 29.16 KG/M2 | SYSTOLIC BLOOD PRESSURE: 124 MMHG | DIASTOLIC BLOOD PRESSURE: 80 MMHG

## 2022-10-11 DIAGNOSIS — I71.43 INFRARENAL ABDOMINAL AORTIC ANEURYSM (AAA) WITHOUT RUPTURE: Primary | ICD-10-CM

## 2022-10-11 PROCEDURE — 99203 OFFICE O/P NEW LOW 30 MIN: CPT | Performed by: SURGERY

## 2022-10-11 PROCEDURE — 93798 PHYS/QHP OP CAR RHAB W/ECG: CPT

## 2022-10-11 NOTE — LETTER
[facility]) - Vascular and Endovascular Surgeons  [address] [address]  Phone: [phone]  Fax: Salbador Carvajal MD    October 11, 2022     Verner Beer SMIT  [address] [address]    Patient: Steph Cosme   MR Number: [id number]   YOB: 1970   Date of Visit: 10/11/2022       Dear Verner Beer [facility]:    Thank you for referring Rylee Graham to me for evaluation/treatment. Below are the relevant portions of my assessment and plan of care. If you have questions, please do not hesitate to call me. I look forward to following Estela Chatman along with you.     Sincerely,      Ginny Washington MD

## 2022-10-11 NOTE — PROGRESS NOTES
[facility]  Consultation Note    Chief Complaint / Reason for Consultation  AAA    History of Present Illness  Patient is a 47 y.o. male with history of coronary artery disease status post CABG and recent PCI September 25, 2022 referred for asymptomatic infrarenal abdominal aortic aneurysm. Has a long history of tobacco abuse but quit in August of this year. Denies any family history of aneurysmal disease. Denies any abdominal or back pain. Still with some fatigue and recovering from recent procedures. Review of Systems     Denies fevers, chills, chest pain, shortness of breath, nausea, vomiting, hematemesis, diarrhea, constipation, melena, hematochezia, wt changes, vision problems, blindness, hearing problems, facial droop, slurred speech, extremity weakness, extremity numbness, dysuria. Past Medical History:   has a past medical history of CAD (coronary artery disease), Hyperlipidemia, Hypertension, and MI (mitral incompetence). Past Surgical History:   has a past surgical history that includes Coronary angioplasty with stent; Appendectomy; Nasal septum surgery (11/2014); LASIK (11/15/2020); back surgery; and Coronary artery bypass graft (N/A, 8/1/2022).      Medications:  Current Outpatient Medications on File Prior to Visit   Medication Sig Dispense Refill    ezetimibe (ZETIA) 10 MG tablet Take 1 tablet by mouth daily 90 tablet 2    metoprolol tartrate (LOPRESSOR) 25 MG tablet Take 1 tablet by mouth 2 times daily 180 tablet 2    ticagrelor (BRILINTA) 90 MG TABS tablet Take 1 tablet by mouth 2 times daily 180 tablet 2    Evolocumab 140 MG/ML SOAJ Inject 140 mg into the skin every 14 days (Patient not taking: Reported on 10/6/2022) 6 Adjustable Dose Pre-filled Pen Syringe 3    aspirin EC 81 MG EC tablet Take 1 tablet by mouth daily 90 tablet 1    pantoprazole (PROTONIX) 40 MG tablet Take 40 mg by mouth daily      clonazePAM (KLONOPIN) 0.5 MG tablet Take 0.5 mg by mouth 2 times daily as needed. No current facility-administered medications on file prior to visit. Allergies: Allergies   Allergen Reactions    Fenofibrate     Statins      Crestor, pravachol        Social History:   reports that he quit smoking about 3 months ago. His smoking use included cigarettes. He smoked an average of .25 packs per day. He has never used smokeless tobacco. He reports current drug use. Drug: Marijuana Guy Melendez). He reports that he does not drink alcohol. Family History:  family history includes Diabetes in his father and mother; Heart Disease in his father; High Blood Pressure in his father and mother; High Cholesterol in his father and mother. Vital Signs  There were no vitals filed for this visit. Physical Examination  General:  no apparent distress  Psychiatric: affect appropriate  Head/Eyes/Ears/Nose/Throat:  Atraumatic, vision and hearing intact, face symmetric  Neck:  supple  Chest/Lungs: clear to auscultation bilaterally  Cardiac:  Regular rate and rhythm  Abdomen: soft, nontender  Extremities: warm and well perfused  - bilateral upper extremity motorsensory intact  - bilateral lower extremity motorsensory intact  Vascular exam:  2+      Labs  Lab Results   Component Value Date/Time    WBC 6.6 2022 06:30 AM    HGB 14.1 2022 06:30 AM    HCT 42.7 2022 06:30 AM    MCV 85.1 2022 06:30 AM     2022 06:30 AM     Lab Results   Component Value Date/Time     2022 06:30 AM    K 3.8 2022 06:30 AM    K 3.9 2022 05:38 AM     2022 06:30 AM    CO2 23 2022 06:30 AM    PHOS 3.5 2022 04:43 AM    BUN 12 2022 06:30 AM    CREATININE 0.9 2022 06:30 AM      No results found for: GLU    Imagin. CHEST: No acute vascular abnormality is identified. There is no thoracic   aortic dissection or aneurysm. 2. Status post sternotomy. No parasternal fluid collection or complication   is evident.    3. No acute cardiopulmonary disease. 4. ABDOMEN PELVIS: No acute vascular abnormality of the abdomen or pelvis. 5. 3.2 cm aneurysm of the infrarenal abdominal aorta. 3 year follow-up   imaging is recommended. 6. No acute abdominopelvic abnormality otherwise identified. 7. Cholelithiasis. Hepatic steatosis. No orders to display     No results found. Assessment:   AAA  History of tobacco abuse  Coronary artery disease status post CABG      Plan:  79-year-old male with small asymptomatic 3.2 cm infrarenal aortic aneurysm. Recommend repeat abdominal duplex in 3 years for continued surveillance. Natural history of aneurysmal disease discussed with the patient. All questions answered      Carlo Marroquin M.D., FACS.   10/11/2022  12:04 PM

## 2022-10-12 ENCOUNTER — HOSPITAL ENCOUNTER (OUTPATIENT)
Dept: CARDIAC REHAB | Age: 52
Setting detail: THERAPIES SERIES
End: 2022-10-12
Payer: COMMERCIAL

## 2022-10-12 NOTE — PROGRESS NOTES
[address]  H+P  Consult  OP Visit  FU Visit   CC HX HPI   GEN  Doing well. No new concerns. CAD CABG, PCI Ø CP, SOB. HTN  Ambulatory BP in good range. Ø HA/dizziness. CHOL  Last lipid reviewed. On max dose/tolerated statin. TOB  Quit smoking. MED  Compliant with CV meds. Ø reported SA. HISTORY/ALLERGY/ROS   MEDHx  has a past medical history of CAD (coronary artery disease), Hyperlipidemia, Hypertension, and MI (mitral incompetence). SURGHx  has a past surgical history that includes Coronary angioplasty with stent; Appendectomy; Nasal septum surgery (11/2014); LASIK (11/15/2020); back surgery; and Coronary artery bypass graft (N/A, 8/1/2022). SOCHx  reports that he quit smoking about 3 months ago. His smoking use included cigarettes. He smoked an average of .25 packs per day. He has never used smokeless tobacco. He reports current drug use. Drug: Marijuana Natalie Ege). He reports that he does not drink alcohol. FAMHx family history includes Diabetes in his father and mother; Heart Disease in his father; High Blood Pressure in his father and mother; High Cholesterol in his father and mother.    ALLERG Fenofibrate and Statins   ROS Full ROS obtained and negative except as mentioned in HPI   MEDICATIONS   Current Outpatient Medications   Medication Sig Dispense Refill    ezetimibe (ZETIA) 10 MG tablet Take 1 tablet by mouth daily 90 tablet 2    metoprolol tartrate (LOPRESSOR) 25 MG tablet Take 1 tablet by mouth 2 times daily 180 tablet 2    ticagrelor (BRILINTA) 90 MG TABS tablet Take 1 tablet by mouth 2 times daily 180 tablet 2    Evolocumab 140 MG/ML SOAJ Inject 140 mg into the skin every 14 days (Patient not taking: Reported on 10/6/2022) 6 Adjustable Dose Pre-filled Pen Syringe 3    aspirin EC 81 MG EC tablet Take 1 tablet by mouth daily 90 tablet 1    pantoprazole (PROTONIX) 40 MG tablet Take 40 mg by mouth daily      clonazePAM (KLONOPIN) 0.5 MG tablet Take 0.5 mg by mouth 2 times LAD/kissing balloon diagonal with 1 KARLY Ginny Cabrera)  MVD, no intervention, CTS consult Ginny Cabrera)  Failed SVG-OM, PCI of LM into Cx and OM1 Ginny Cabrera)   MPI 10/15 71% Small apical infarct, no ischemia   TTE 1/14 7/22 9/22 55%  45%  50% Apical anterior hk    HK of apex, distal anteroseptum, mid anterolateral walls   Plan   Continue aggressive medical tx at doses above   *HTN  Status Controlled   Plan Counseled on diet/salt/exercise/weight, continue meds at doses above   *CHOL  , 9/22   Plan Counseled on diet/exercise/weight, continue HI/MT statin  Recently started Repatha, has not taken yet   *TOB  Status quit, available PFTs reviewed personally   Plan Continued avoidance of 1st and 2nd hand smoke, counseled on risks/cessation   *COMPLIANCE  Status Compliant   Plan Discussed compliance with meds/diet/salt/exercise; avoid tob/alc/drugs   *FOLLOWUP  6 months

## 2022-10-13 ENCOUNTER — HOSPITAL ENCOUNTER (OUTPATIENT)
Dept: CARDIAC REHAB | Age: 52
Setting detail: THERAPIES SERIES
Discharge: HOME | End: 2022-10-13
Payer: COMMERCIAL

## 2022-10-13 ENCOUNTER — OFFICE VISIT (OUTPATIENT)
Dept: CARDIOLOGY CLINIC | Age: 52
End: 2022-10-13
Payer: COMMERCIAL

## 2022-10-13 VITALS
DIASTOLIC BLOOD PRESSURE: 84 MMHG | WEIGHT: 222 LBS | OXYGEN SATURATION: 97 % | BODY MASS INDEX: 29.42 KG/M2 | HEART RATE: 64 BPM | SYSTOLIC BLOOD PRESSURE: 126 MMHG | HEIGHT: 73 IN

## 2022-10-13 DIAGNOSIS — I25.10 CORONARY ARTERY DISEASE INVOLVING NATIVE CORONARY ARTERY OF NATIVE HEART WITHOUT ANGINA PECTORIS: Primary | ICD-10-CM

## 2022-10-13 DIAGNOSIS — I10 PRIMARY HYPERTENSION: ICD-10-CM

## 2022-10-13 DIAGNOSIS — Z72.0 TOBACCO ABUSE: ICD-10-CM

## 2022-10-13 DIAGNOSIS — E78.2 MIXED HYPERLIPIDEMIA: ICD-10-CM

## 2022-10-13 PROCEDURE — 99214 OFFICE O/P EST MOD 30 MIN: CPT | Performed by: INTERNAL MEDICINE

## 2022-10-13 PROCEDURE — 93798 PHYS/QHP OP CAR RHAB W/ECG: CPT

## 2022-10-14 ENCOUNTER — HOSPITAL ENCOUNTER (OUTPATIENT)
Dept: CARDIAC REHAB | Age: 52
Setting detail: THERAPIES SERIES
End: 2022-10-14
Payer: COMMERCIAL

## 2022-10-17 ENCOUNTER — HOSPITAL ENCOUNTER (OUTPATIENT)
Dept: CARDIAC REHAB | Age: 52
Setting detail: THERAPIES SERIES
Discharge: HOME | End: 2022-10-17
Payer: COMMERCIAL

## 2022-10-19 ENCOUNTER — HOSPITAL ENCOUNTER (OUTPATIENT)
Dept: CARDIAC REHAB | Age: 52
Setting detail: THERAPIES SERIES
Discharge: HOME | End: 2022-10-19
Payer: COMMERCIAL

## 2022-10-19 PROCEDURE — 93798 PHYS/QHP OP CAR RHAB W/ECG: CPT

## 2022-10-21 ENCOUNTER — TELEPHONE (OUTPATIENT)
Dept: CARDIOLOGY CLINIC | Age: 52
End: 2022-10-21

## 2022-10-21 ENCOUNTER — HOSPITAL ENCOUNTER (OUTPATIENT)
Dept: CARDIAC REHAB | Age: 52
Setting detail: THERAPIES SERIES
Discharge: HOME | End: 2022-10-21
Payer: COMMERCIAL

## 2022-10-21 PROCEDURE — 93798 PHYS/QHP OP CAR RHAB W/ECG: CPT

## 2022-10-21 NOTE — TELEPHONE ENCOUNTER
Patient had cardiac rehab today and he is due to go back to work with no restrictions on 10/31/22. The nurse at cardiac rehab said that patient may need to have restrictions on the number of hours/day that patient works when he returns. Please call patient and advise.   lakesha

## 2022-10-24 ENCOUNTER — HOSPITAL ENCOUNTER (OUTPATIENT)
Dept: CARDIAC REHAB | Age: 52
Setting detail: THERAPIES SERIES
Discharge: HOME | End: 2022-10-24
Payer: COMMERCIAL

## 2022-10-24 PROCEDURE — 93798 PHYS/QHP OP CAR RHAB W/ECG: CPT

## 2022-10-24 NOTE — TELEPHONE ENCOUNTER
I spoke with pt and he stated that he is an . He lifts heavy objects over his head, along with heavy lifting. He may be forced to work up to 7 days a week, up to 12 hours. He states that he has 3 sessions in on the Cardiac rehab. He feels that he would like to get 3 more weeks in at the rehab before returning. At for of restrictions are sent , they send the employee home and they tell them no work available. If he get more weeks at the rehab it would lessen his concerns of stability of his chest s/p CABG.

## 2022-10-24 NOTE — TELEPHONE ENCOUNTER
Per DCE, delay in starting cardiac rehab should not interfere with returning to work. Pt concerned sternum and lifting 80lb. Per DCE pt should follow up with CTS Dr. Chichi Juarez about sternum incision concerns. Pt verbalized understanding.

## 2022-10-26 ENCOUNTER — HOSPITAL ENCOUNTER (OUTPATIENT)
Dept: CARDIAC REHAB | Age: 52
Setting detail: THERAPIES SERIES
Discharge: HOME | End: 2022-10-26
Payer: COMMERCIAL

## 2022-10-26 PROCEDURE — 93798 PHYS/QHP OP CAR RHAB W/ECG: CPT

## 2022-10-27 PROBLEM — R77.8 ELEVATED TROPONIN: Status: RESOLVED | Noted: 2022-09-25 | Resolved: 2022-10-27

## 2022-10-27 PROBLEM — R79.89 ELEVATED TROPONIN: Status: RESOLVED | Noted: 2022-09-25 | Resolved: 2022-10-27

## 2022-10-28 ENCOUNTER — HOSPITAL ENCOUNTER (OUTPATIENT)
Dept: CARDIAC REHAB | Age: 52
Setting detail: THERAPIES SERIES
Discharge: HOME | End: 2022-10-28
Payer: COMMERCIAL

## 2022-10-28 PROCEDURE — 93798 PHYS/QHP OP CAR RHAB W/ECG: CPT

## 2022-10-31 ENCOUNTER — HOSPITAL ENCOUNTER (OUTPATIENT)
Dept: CARDIAC REHAB | Age: 52
Setting detail: THERAPIES SERIES
Discharge: HOME | End: 2022-10-31
Payer: COMMERCIAL

## 2022-11-04 ENCOUNTER — TELEPHONE (OUTPATIENT)
Dept: CARDIOLOGY CLINIC | Age: 52
End: 2022-11-04

## 2022-11-04 DIAGNOSIS — R00.2 HEART PALPITATIONS: Primary | ICD-10-CM

## 2022-11-04 NOTE — TELEPHONE ENCOUNTER
Called pt about the message below and he stated that he don't have any pains or anything and he is not able to explain how he is feeling.

## 2022-11-04 NOTE — TELEPHONE ENCOUNTER
Called patient at 1200 to discuss symptoms. Discussed with DCE. Per DCE, 30 day cardiac event monitor and follow up with PCP regarding anxiety. Called patient and relayed plan. Event monitor will be mailed to patient. Pt verbalized agreement and understanding with plan.

## 2022-11-04 NOTE — PROGRESS NOTES
Pt called on Monday 10/31/2022 and discontinuing cardiac rehabilitation at this time due to demanding work schedule. Pt just returned to work and no time to attend rehab.

## 2022-11-04 NOTE — TELEPHONE ENCOUNTER
Pt states his heart starts racing and it is taking his breath away. Also getting an emptiness feeling in his chest than his heart starts racing again. Pt could not explain exactly how the felling is. Pt is concerned something is going on and would like to see DCE. Please advise Pt.

## 2023-02-27 NOTE — PROGRESS NOTES
Cardiothoracic Surgery Progress Note    CC: [place], Severe 3V CAD, HTN, HLD    S/P: URGENT CABG CORONARY ARTERY BYPASS X 3/DEJAH    POD# 1    Subjective:  Hemodynamically stable, 6L NC, afebrile. Alert and oriented X 3. Weight  101.3 kg this am.     WT:   pre-op 95.8 kg    Vital Signs:   /81   Pulse 98   Temp 98.4 °F (36.9 °C) (Axillary)   Resp 17   Ht 6' 1\" (1.854 m)   Wt 223 lb 5.2 oz (101.3 kg)   SpO2 93%   BMI 29.46 kg/m²     IJ    Gtts: Insulin    Physical Exam:   Cardiac: regular, no murmur  Lungs: clear to auscultation, decreased bases bilaterally  Abdomen:  non-tender, ABD soft, faint BS  Vascular:  pulses all palpable   Extremities: generalized, non-pitting edema 1+  :  UOP 2270/595/365   Lasix 20 IV BID  Chest Tube(s) & Incision(s):    Chest Tubes: 95/65/120  no airleak no crepitus -20cm sx  Sternum: stable,C/D/I   Edges approximated, no drainage  Chest tube site: C/D/I  Purse string intact   Left  leg incision:  Ace wrap C/D/I  Edges well approximated, no drainage   Prevena Dressing w/ scant ss drainage       Labs:   CBC:   Recent Labs     08/01/22  1243 08/01/22  1244 08/02/22  0005 08/02/22  0400   WBC 18.2*  --   --  8.6   HGB 12.8*   < > 10.6* 10.8*   HCT 38.2*  --   --  31.6*     --   --  141    < > = values in this interval not displayed.      BMP:   Recent Labs     08/01/22  1243 08/02/22  0400   K 4.2 4.4   CREATININE 1.1 1.1   CALCIUM 9.5 8.5   MG 2.80* 1.80     PT/INR:   Recent Labs     08/01/22  1243 08/02/22  0400   PROTIME 17.1* 15.5*   INR 1.40* 1.24*       Assessment/Plan:  As per CC:     Plan:   S/P CABG X 3/DEJAH  Aggressive IS - EZPAP  Diuresis - strict I/O, fluid restriction   Wean oxygen  Laxative of choice   Ambulate  Pain - add tony/robaxin/1 dose of 15 mg Toradol  Labs:WBC 8.6, H/H 10/31, , K+ 4.4, Creat 1.1, Ca++ 8.5, Mg 1.80  Change HHN to q4 hrs  Okay to D/C PW today  CXR    Disp:  Plan for home with home care       Electronically signed by Arline Baca, APRN - CNP on 8/2/2022 at 8:32 AM      Attending Supervising [de-identified] Attestation Statement  The patient met the criteria for direct supervision. I saw and examined the patient and discussed the findings and plans with the nurse practitioner and agree as documented in his note . PAST SURGICAL HISTORY:  Status post amputation of toe s/p R 3/4th partial ray resection (4/2021; s/p 2nd partial ray resection (5/2021)

## 2023-05-01 ENCOUNTER — OFFICE VISIT (OUTPATIENT)
Dept: CARDIOLOGY CLINIC | Age: 53
End: 2023-05-01
Payer: COMMERCIAL

## 2023-05-01 VITALS
SYSTOLIC BLOOD PRESSURE: 130 MMHG | BODY MASS INDEX: 29.16 KG/M2 | HEART RATE: 68 BPM | DIASTOLIC BLOOD PRESSURE: 70 MMHG | HEIGHT: 73 IN | OXYGEN SATURATION: 98 % | WEIGHT: 220 LBS

## 2023-05-01 DIAGNOSIS — I10 PRIMARY HYPERTENSION: ICD-10-CM

## 2023-05-01 DIAGNOSIS — E78.2 MIXED HYPERLIPIDEMIA: ICD-10-CM

## 2023-05-01 DIAGNOSIS — I25.83 CORONARY ARTERY DISEASE DUE TO LIPID RICH PLAQUE: Primary | ICD-10-CM

## 2023-05-01 DIAGNOSIS — I25.10 CORONARY ARTERY DISEASE DUE TO LIPID RICH PLAQUE: Primary | ICD-10-CM

## 2023-05-01 PROCEDURE — 99214 OFFICE O/P EST MOD 30 MIN: CPT | Performed by: NURSE PRACTITIONER

## 2023-05-01 PROCEDURE — 3078F DIAST BP <80 MM HG: CPT | Performed by: NURSE PRACTITIONER

## 2023-05-01 PROCEDURE — 3074F SYST BP LT 130 MM HG: CPT | Performed by: NURSE PRACTITIONER

## 2023-05-01 RX ORDER — NITROGLYCERIN 0.4 MG/1
0.4 TABLET SUBLINGUAL EVERY 5 MIN PRN
Qty: 25 TABLET | Refills: 3 | Status: SHIPPED | OUTPATIENT
Start: 2023-05-01

## 2023-05-01 RX ORDER — RANOLAZINE 500 MG/1
500 TABLET, EXTENDED RELEASE ORAL 2 TIMES DAILY
Qty: 60 TABLET | Refills: 3 | Status: SHIPPED | OUTPATIENT
Start: 2023-05-01

## 2023-05-01 NOTE — PATIENT INSTRUCTIONS
Nitroglycerin sublingual if needed for chest pain / angina    Begin Ranexa 500 mg twice a day    Appt in 6 weeks

## 2023-05-01 NOTE — PROGRESS NOTES
[facility]     Outpatient Follow Up Note    Goran Brooks. is 47 y.o. male who presents today with a history of CAD s/p PTCA LAD & RCA '10; s/p PTCA LAD & diag '16;  NSTEMI / CAD s/p CABG with DEJAH clip Aug '22 ;  s/p PTCA LM into CX & OM-1  (failed SVG > OM); HTN and hyperlipidemia    CHIEF COMPLAINT / HPI:  Follow Up secondary to coronary artery disease    Subjective:   He still has CP. It started 3-4 weeks ago. He tries to get 10-00263 steps / day. His discomfort starts coming on (shoulder and jaw). He wants to know why Repatha hasn't helped. He quit smoking and limited red meats / fast foods. There is SOB which doesn't last long. The patient denies orthopnea/PND. The patient does not have swelling. The patients weight is stable . The patient is not experiencing palpitations or dizziness. These symptoms are recurrent since the last OV. With regard to medication therapy the patient has been compliant with prescribed regimen. They have tolerated therapy to date.      Past Medical History:   Diagnosis Date    CAD (coronary artery disease)     Hyperlipidemia     Hypertension     MI (mitral incompetence)      Social History:    Social History     Tobacco Use   Smoking Status Former    Packs/day: 0.25    Types: Cigarettes    Quit date: 2022    Years since quittin.8   Smokeless Tobacco Never   Tobacco Comments    1 ppd     Current Medications:  Current Outpatient Medications   Medication Sig Dispense Refill    ezetimibe (ZETIA) 10 MG tablet Take 1 tablet by mouth daily 90 tablet 2    metoprolol tartrate (LOPRESSOR) 25 MG tablet Take 1 tablet by mouth 2 times daily 180 tablet 2    ticagrelor (BRILINTA) 90 MG TABS tablet Take 1 tablet by mouth 2 times daily 180 tablet 2    Evolocumab 140 MG/ML SOAJ Inject 140 mg into the skin every 14 days 6 Adjustable Dose Pre-filled Pen Syringe 3    aspirin EC 81 MG EC tablet Take 1 tablet by mouth daily 90 tablet 1    pantoprazole (PROTONIX) 40

## 2023-05-04 ENCOUNTER — TELEPHONE (OUTPATIENT)
Dept: CARDIOLOGY CLINIC | Age: 53
End: 2023-05-04

## 2023-05-04 NOTE — TELEPHONE ENCOUNTER
Relayed to Danielle Arguelles that [facility] [place] is in agreement with Ranexa and doesn't need a cath nor NM at this time    Pt wonders if his symptoms aren't from his rotator cuff since the pain is close to it. He didn't start taking Ranexa since he doesn't want to be on another long term med. He'll use NTG if needed    Will let us know if anything changes.

## 2023-05-08 RX ORDER — TICAGRELOR 90 MG/1
TABLET ORAL
Qty: 180 TABLET | Refills: 3 | Status: SHIPPED | OUTPATIENT
Start: 2023-05-08

## 2023-05-18 ENCOUNTER — TELEPHONE (OUTPATIENT)
Dept: CARDIOLOGY CLINIC | Age: 53
End: 2023-05-18

## 2023-05-23 NOTE — TELEPHONE ENCOUNTER
Last OV:05/01/2023  Aprn Stanley Degroot  Last Labs:lipid-09/027/2022  Aprn Stanley Degroot  Next OV:06/12/2023  Aprn Stanley Degroot    Last Refill: ezetimibe-10/04/2022 Zoraida Aguirre

## 2023-05-25 RX ORDER — EZETIMIBE 10 MG/1
10 TABLET ORAL DAILY
Qty: 90 TABLET | Refills: 3 | Status: SHIPPED | OUTPATIENT
Start: 2023-05-25

## 2023-05-30 RX ORDER — NITROGLYCERIN 0.4 MG/1
TABLET SUBLINGUAL
Qty: 25 TABLET | Refills: 3 | Status: SHIPPED | OUTPATIENT
Start: 2023-05-30

## 2023-07-10 NOTE — TELEPHONE ENCOUNTER
Last OV:06/12/2023  Candido Herzog  Last Labs:lipid-10/04/2022 Jenn Herzog  Next OV:09/21/2023  Raul Mcallister  Last Refill: KSINPDZ-15/19/5321  Jenn Herzog

## 2023-07-11 RX ORDER — EVOLOCUMAB 140 MG/ML
INJECTION, SOLUTION SUBCUTANEOUS
Qty: 6 ML | Refills: 3 | Status: SHIPPED | OUTPATIENT
Start: 2023-07-11

## 2023-07-12 RX ORDER — NITROGLYCERIN 0.4 MG/1
TABLET SUBLINGUAL
Qty: 25 TABLET | Refills: 3 | Status: SHIPPED | OUTPATIENT
Start: 2023-07-12

## 2023-09-08 RX ORDER — NITROGLYCERIN 0.4 MG/1
TABLET SUBLINGUAL
Qty: 25 TABLET | Refills: 3 | Status: SHIPPED | OUTPATIENT
Start: 2023-09-08

## 2023-09-08 NOTE — TELEPHONE ENCOUNTER
Received refill request for nitroGLYCERIN (NITROSTAT) 0.4 MG SL tablet from [facility].      Last OV: 6- NPTS    Next OV: 9- DCE    Last Labs: 9- CMP    Last Filled:  7- DCE

## 2023-09-21 ENCOUNTER — OFFICE VISIT (OUTPATIENT)
Dept: CARDIOLOGY CLINIC | Age: 53
End: 2023-09-21
Payer: COMMERCIAL

## 2023-09-21 VITALS
SYSTOLIC BLOOD PRESSURE: 142 MMHG | BODY MASS INDEX: 31.14 KG/M2 | WEIGHT: 222.4 LBS | DIASTOLIC BLOOD PRESSURE: 90 MMHG | HEART RATE: 62 BPM | HEIGHT: 71 IN

## 2023-09-21 DIAGNOSIS — Z95.1 HX OF CABG: ICD-10-CM

## 2023-09-21 DIAGNOSIS — I25.83 CORONARY ARTERY DISEASE DUE TO LIPID RICH PLAQUE: Primary | ICD-10-CM

## 2023-09-21 DIAGNOSIS — I25.10 CORONARY ARTERY DISEASE DUE TO LIPID RICH PLAQUE: Primary | ICD-10-CM

## 2023-09-21 DIAGNOSIS — E78.00 HYPERCHOLESTEROLEMIA: ICD-10-CM

## 2023-09-21 DIAGNOSIS — I10 ESSENTIAL HYPERTENSION: ICD-10-CM

## 2023-09-21 PROCEDURE — 3080F DIAST BP >= 90 MM HG: CPT | Performed by: INTERNAL MEDICINE

## 2023-09-21 PROCEDURE — 3075F SYST BP GE 130 - 139MM HG: CPT | Performed by: INTERNAL MEDICINE

## 2023-09-21 PROCEDURE — 99214 OFFICE O/P EST MOD 30 MIN: CPT | Performed by: INTERNAL MEDICINE

## 2023-09-21 RX ORDER — ISOSORBIDE MONONITRATE 30 MG/1
30 TABLET, EXTENDED RELEASE ORAL DAILY
Qty: 90 TABLET | Refills: 3 | Status: SHIPPED | OUTPATIENT
Start: 2023-09-21

## 2023-09-21 RX ORDER — RANOLAZINE 1000 MG/1
1000 TABLET, EXTENDED RELEASE ORAL 2 TIMES DAILY
Qty: 180 TABLET | Refills: 3 | Status: SHIPPED | OUTPATIENT
Start: 2023-09-21

## 2023-10-09 NOTE — PROGRESS NOTES
native LM-Cx and OM1 Malcolm Netkarson)   MPI 10/15 71% Small apical infarct, no ischemia   TTE 1/14 7/22 9/22  55%  45%  50%     Plan     Chronic stable angina, likely small vessel disease  Continue current meds   *HTN  Status Advice Plan   Controlled Diet/salt/xcise/wt counseling Continue meds at doses above   *CHOL  LDL Advice Plan   40, 1/23 Diet/salt/xcise/wt counseling Continue HI/MT statin   *FOLLOWUP  6 months

## 2023-10-09 NOTE — PATIENT INSTRUCTIONS
You have microvessel disease. This is disease in the small vessels of your heart. If your chest pain or shortness of breath worsens call our office.     Astrid Santiago is Dr Mario Appiah Nurse

## 2023-10-19 ENCOUNTER — OFFICE VISIT (OUTPATIENT)
Dept: CARDIOLOGY CLINIC | Age: 53
End: 2023-10-19
Payer: COMMERCIAL

## 2023-10-19 VITALS
DIASTOLIC BLOOD PRESSURE: 86 MMHG | HEIGHT: 71 IN | SYSTOLIC BLOOD PRESSURE: 126 MMHG | OXYGEN SATURATION: 99 % | WEIGHT: 220 LBS | BODY MASS INDEX: 30.8 KG/M2 | HEART RATE: 68 BPM

## 2023-10-19 DIAGNOSIS — I10 ESSENTIAL HYPERTENSION: ICD-10-CM

## 2023-10-19 DIAGNOSIS — E78.00 HYPERCHOLESTEROLEMIA: ICD-10-CM

## 2023-10-19 DIAGNOSIS — I25.10 CORONARY ARTERY DISEASE DUE TO LIPID RICH PLAQUE: Primary | ICD-10-CM

## 2023-10-19 DIAGNOSIS — I25.83 CORONARY ARTERY DISEASE DUE TO LIPID RICH PLAQUE: Primary | ICD-10-CM

## 2023-10-19 DIAGNOSIS — R07.9 CHEST PAIN, UNSPECIFIED TYPE: ICD-10-CM

## 2023-10-19 PROCEDURE — 3079F DIAST BP 80-89 MM HG: CPT | Performed by: INTERNAL MEDICINE

## 2023-10-19 PROCEDURE — 99214 OFFICE O/P EST MOD 30 MIN: CPT | Performed by: INTERNAL MEDICINE

## 2023-10-19 PROCEDURE — 3074F SYST BP LT 130 MM HG: CPT | Performed by: INTERNAL MEDICINE

## 2023-10-19 PROCEDURE — 93000 ELECTROCARDIOGRAM COMPLETE: CPT | Performed by: INTERNAL MEDICINE

## 2023-12-29 ENCOUNTER — HOSPITAL ENCOUNTER (OUTPATIENT)
Dept: CARDIAC CATH/INVASIVE PROCEDURES | Age: 53
Discharge: HOME | End: 2023-12-29
Attending: INTERNAL MEDICINE | Admitting: INTERNAL MEDICINE
Payer: COMMERCIAL

## 2023-12-29 VITALS
BODY MASS INDEX: 30.8 KG/M2 | HEART RATE: 60 BPM | DIASTOLIC BLOOD PRESSURE: 85 MMHG | RESPIRATION RATE: 18 BRPM | OXYGEN SATURATION: 100 % | SYSTOLIC BLOOD PRESSURE: 118 MMHG | HEIGHT: 71 IN | WEIGHT: 220 LBS

## 2023-12-29 LAB
ANION GAP SERPL CALCULATED.3IONS-SCNC: 8 MMOL/L (ref 3–16)
BUN SERPL-MCNC: 14 MG/DL (ref 7–20)
CALCIUM SERPL-MCNC: 9.3 MG/DL (ref 8.3–10.6)
CHLORIDE SERPL-SCNC: 104 MMOL/L (ref 99–110)
CO2 SERPL-SCNC: 25 MMOL/L (ref 21–32)
CREAT SERPL-MCNC: 1 MG/DL (ref 0.9–1.3)
DEPRECATED RDW RBC AUTO: 13.3 % (ref 12.4–15.4)
GFR SERPLBLD CREATININE-BSD FMLA CKD-EPI: >60 ML/MIN/{1.73_M2}
GLUCOSE SERPL-MCNC: 109 MG/DL (ref 70–99)
HCT VFR BLD AUTO: 44.4 % (ref 40.5–52.5)
HGB BLD-MCNC: 15.4 G/DL (ref 13.5–17.5)
LEFT VENTRICULAR EJECTION FRACTION MODE: NORMAL
LV EF: 40 %
MCH RBC QN AUTO: 32.1 PG (ref 26–34)
MCHC RBC AUTO-ENTMCNC: 34.6 G/DL (ref 31–36)
MCV RBC AUTO: 92.7 FL (ref 80–100)
PLATELET # BLD AUTO: 171 K/UL (ref 135–450)
PMV BLD AUTO: 7.6 FL (ref 5–10.5)
POTASSIUM SERPL-SCNC: 4 MMOL/L (ref 3.5–5.1)
RBC # BLD AUTO: 4.79 M/UL (ref 4.2–5.9)
SODIUM SERPL-SCNC: 137 MMOL/L (ref 136–145)
WBC # BLD AUTO: 4.6 K/UL (ref 4–11)

## 2023-12-29 PROCEDURE — 6360000002 HC RX W HCPCS

## 2023-12-29 PROCEDURE — 93459 L HRT ART/GRFT ANGIO: CPT | Performed by: INTERNAL MEDICINE

## 2023-12-29 PROCEDURE — 80048 BASIC METABOLIC PNL TOTAL CA: CPT

## 2023-12-29 PROCEDURE — 93005 ELECTROCARDIOGRAM TRACING: CPT

## 2023-12-29 PROCEDURE — 93459 L HRT ART/GRFT ANGIO: CPT

## 2023-12-29 PROCEDURE — 36415 COLL VENOUS BLD VENIPUNCTURE: CPT

## 2023-12-29 PROCEDURE — C1769 GUIDE WIRE: HCPCS

## 2023-12-29 PROCEDURE — 2709999900 HC NON-CHARGEABLE SUPPLY

## 2023-12-29 PROCEDURE — 99152 MOD SED SAME PHYS/QHP 5/>YRS: CPT

## 2023-12-29 PROCEDURE — 85027 COMPLETE CBC AUTOMATED: CPT

## 2023-12-29 PROCEDURE — 2500000003 HC RX 250 WO HCPCS

## 2023-12-29 PROCEDURE — 6360000004 HC RX CONTRAST MEDICATION: Performed by: INTERNAL MEDICINE

## 2023-12-29 PROCEDURE — 99152 MOD SED SAME PHYS/QHP 5/>YRS: CPT | Performed by: INTERNAL MEDICINE

## 2023-12-29 PROCEDURE — C1894 INTRO/SHEATH, NON-LASER: HCPCS

## 2023-12-29 RX ORDER — SODIUM CHLORIDE 0.9 % (FLUSH) 0.9 %
5-40 SYRINGE (ML) INJECTION PRN
Status: DISCONTINUED | OUTPATIENT
Start: 2023-12-29 | End: 2023-12-29 | Stop reason: HOSPADM

## 2023-12-29 RX ADMIN — IOPAMIDOL 62 ML: 755 INJECTION, SOLUTION INTRAVENOUS at 11:38

## 2023-12-29 NOTE — OP NOTE
[address] [facility] Operative Note     PROCEDURE SUMMARY   Procedure [facility] with grafts   Indication UA   Consent Obtained   Access [facility]   US US guidance used to determine artery patency, size (>2mm), anatomic variations and ideal puncture location. Real-time US utilized concurrent with vascular needle entry into artery. Image(s) permanently recorded and reported in chart. Bleed Risk Low   Sedation Minimal conscious sedation for comfort. Independent trained observer pushed medications at my direction. Level of consciousness and vital signs/physiologic status monitored throughout the procedure (see start and stop times above, as well as medication dosages). Start Time 1111   Stop Time 1132   Versed 4mg   Fentanyl 200mcg   Contrast 62cc   Flouro 2.8min   EBL <94EJ   Complicat None   Specimens None   Procedure Detail Patient taken to cath lab in postabsorptive state, informed consent obtained.   RCFA prepped and draped in normal sterile fashion  18G needle used to access artery With US guidance  J wire advanced into artery and 4F sheath advanced over wire   JL 3.5 advanced over wire to engage LM coronary artery and image in multiple views  JL 3.5 exchanged over a wire for JR4 to engage RCA and image in multiple views  JR4 removed over J wire  Arterial hemostasis obtained with Manual pressure     FINDINGS   Artery Findings   LM 99%   % prox   Cx 99% prox   S-O 100% prox   S-PDA patent   L-LAD patent   % mid   LVEDP 13mmHg Normal 3-12mmHg   LVG Abnormal with EF <55% and estimated at 40% with Inferior hypokinesis wall motion Normal >/= 55%   AVG Normal     INTERVENTION(S)   None    POST CATH  RECOMMENDATIONS   Continued aggressive medical therapy and risk factor modification

## 2023-12-29 NOTE — PROGRESS NOTES
PRE-PROCEDURE    DATE: 12/29/2023 ARRIVAL TO CATH LAB: 8:47 AM    ADMIT SOURCE: Outpatient    ID & ALLERGY BAND: On    CONSENT: Yes    NPO SINCE: Midnight    LABS/PREGNANCY TEST: N/A    PULSES:  Right DP 1+  Right PT 1+  Left DP 1+  Left PT 1+    IV SITE : Started in left arm.  with fluids infusing at kvo 8:47 AM     SURGERIES PLANNED: No    BLEEDING PROBLEMS: Yes    BLEEDING RISK: Low Risk <2%    COMPLIANCE: Yes      PATIENT HISTORY    CHIEF COMPLAINT: Chest Pain    EKG:  Yes    Pre CATH Rhythm: Sinus Bradycardia    STRESS TEST PREFORMED:  Yes FINDINGS:  Stress Nuclear: Date:  10/1/2015  Result:  Positive: Low     EF: 71    CARDIAC CTA PREFORMED:  No    [place] CORONARY CALCIUM SCORE:   Assessed: No    ECHO: DATE:  Yes. Most recent date: 9/7/22      EF:  39    HYPERTENSION: Yes    DYSLIPIDEMIA: Yes    FAMILY HX OF CAD: Yes    PRIOR MI: Yes. Most recent date: ? PRIOR PCI: Yes. Most recent date: 9/25/22    PRIOR CABG: Yes. Most recent date: 8/1/22    CEREBRALVASCULAR DX: No    PERIPHERAL ARTERIAL DISEASE: No    CHRONIC LUNG DISEASE: No    TOBACCO: Former.    Quit Date: 7/4/22    DIABETIC: No    CARDIAC ARREST: No    DIALYSIS: No    FRAILTY SCORE: 2 WELL (no active disease symptoms, active and exercise occasionally)

## 2023-12-29 NOTE — DISCHARGE INSTRUCTIONS
LEFT HEART CATHETERIZATION    Care of your puncture site:  Remove bandage 24 hours after the procedure. May shower in 24 hours but do not sit in a bathtub/pool of water for 5 days or until the wound is healed. Inspect the site daily and gently clean using soap and water while standing in the shower. Dry thoroughly and apply a Band-Aid that covers the entire site. Do not apply powder or lotion. Normal Observations:  Soreness or tenderness which may last one week. Mild oozing from the incision site. Possible bruising that could last 2 weeks. Activity:  You may resume driving 24 hours following the procedure. You may resume normal activity in 5 days or after the wound heals. Avoid lifting more than 10 pounds for 5 days or until the wound heals. Avoid strenuous exercise or activity for 1 week. Nutrition:  Regular diet   Drink at least 8 to 10 glasses of decaffeinated, non-alcoholic fluid for the next 24 hours to flush the x-ray dye used for your angiogram out of your body. Call your doctor immediately if your condition worsens, for any other concerns, for a follow-up appointment or if you experience any of the following:  Significant bleeding that does not stop after 10 minutes of applying firm pressure on the puncture site. Increased swelling on the groin or leg. Unusual pain, numbness, or tingling of the groin or down the leg. Any signs of infection such as: redness, yellow drainage at the site, swelling or pain.     Other Instructions:  Any questions or concerns, call Dr Mariano Arevalo office at [phone]

## 2023-12-29 NOTE — H&P
[facility]  H+P  Consult  OP Visit  FU Visit   CC/HPI   CC Presents for LHC due to UA   Intervention PCI, CABG   General Presents for LHC due to worsening cp, sob consistent with UA. Cardiac Sx -dizziness, -syncope, -edema, -orthopnea, -pnd, -fatigue, +CP, +SOB   HISTORY/ALLERGY/ROS   M/S/S/F Hx Reviewed in Epic and updated as appropriate   ALLERG Fenofibrate and Statins   ROS Full ROS obtained and negative except as mentioned in HPI   MEDICATIONS   Cardiac medications reviewed in Epic during visit. PHYSICAL EXAM   Vitals There were no vitals taken for this visit. Gen Alert, coop, no distress Heart  RRR, no MRG   Lung CTA-B, unlabored, no DTP Extrem Edema -Grade 0 (0mm)      COMPLIANCE   Discussed and counseled on diet, exercise, weight loss, smoking, alcohol, drugs. All questions answered.    ASSESSMENT AND PLAN   *CAD    Date EF Results   Sx     Angina as above   Hx 9/22   LIMA-LAD, SVG-RCA, SVG-OM1 (Topalidis)   LHC 7/22 9/22   MVD=>CABG (Andrade)  Failed SVG-OM1, PCI of native LM-Cx and OM1 Salazar Gonzalez)   MPI 10/15 71% Small apical infarct, no ischemia   TTE 1/14 7/22 9/22  55%  45%  50%     Plan     UA - LHC, R/B/O discussed   *HTN  Status Advice Plan   Controlled Diet/salt/xcise/wt counseling Continue meds at doses above   *CHOL  LDL Advice Plan   40, 1/23 Diet/salt/xcise/wt counseling Continue HI/MT statin

## 2023-12-29 NOTE — PRE SEDATION
[facility]  Pre-sedation Assessment   PROCEDURE   Planned Procedure Cardiac catheterization   Consent I have discussed with the patient and/or the patient representative the indication, alternatives, and the possible risks and/or complications of the planned procedure and the anesthesia methods. The patient and/or patient representative appear to understand and agree to proceed.   INDICATION   Chief Complaint Chest Pain/Pressure  Anginal Equivalent  Dyspnea on Exertion  Dyspnea  Fatigue   Presentation New Onset Angina <= 2 months and Worsening Angina   Anginal Class (2 wks) CCS III - Symptoms with everyday living activities, i.e., moderate limitation   Anginal Symptoms Typical chest pain or anginal equivalent   CHF Class (2 wks) No symptoms   CV Instability Yes   ISCHEMIC WORKUP/MANAGEMENT/EVALUTION   Stress Test No   Anginal Meds Yes: Aspirin, Beta Blockers, Long Acting Nitrates (Any), and Ranolazine   UPCOMING SURGERY   Valve surgery No   MEDICAL HISTORY   Vital Signs There were no vitals taken for this visit.   Allergies Reviewed in EMR   Medications Reviewed in EMR   Medical History Reviewed in EMR   Surgical History Reviewed in EMR   PRE-SEDATION   Exam I have personally completed a history, physical exam & review of systems for this patient (see notes).   Hx Anesthesia Issue None   Mod Mallampati II   ASA Class Class 2 - A normal healthy patient with mild systemic disease   MORRIS SCALE   Activity 2 - Able to move 4 extrem on command   Respiration 2 - Able to breath deeply and cough freely   Circulation 2 - BP +/- 20mmHg of normal   Consciousness 2 - Fully awake   Oxygen Saturation 2 - Able to maintain oxygen sat >92% on room air   SEDATION/ANESTHESIA PLAN   Goal Guard patient safety and welfare. Minimize physical discomfort and pain. Minimize negative psychological responses to treatment by providing sedation and analgesia and maximize the potential amnesia.  Patient to meet pre-procedure  discharge plan.    Medications Midazolam intravenously and fentanyl intravenously   Appropriate Candidate Yes   Post Procedure Return to same level of care

## 2023-12-30 LAB
EKG ATRIAL RATE: 56 BPM
EKG DIAGNOSIS: NORMAL
EKG P AXIS: 15 DEGREES
EKG P-R INTERVAL: 216 MS
EKG Q-T INTERVAL: 436 MS
EKG QRS DURATION: 92 MS
EKG QTC CALCULATION (BAZETT): 420 MS
EKG R AXIS: -27 DEGREES
EKG T AXIS: 57 DEGREES
EKG VENTRICULAR RATE: 56 BPM

## 2024-01-02 ENCOUNTER — TELEPHONE (OUTPATIENT)
Dept: CARDIOLOGY CLINIC | Age: 54
End: 2024-01-02

## 2024-01-02 RX ORDER — NITROGLYCERIN 0.4 MG/1
TABLET SUBLINGUAL
Qty: 30 TABLET | Refills: 11 | Status: SHIPPED | OUTPATIENT
Start: 2024-01-02

## 2024-01-02 NOTE — TELEPHONE ENCOUNTER
DCE reviewed. Some progression in CAD, stent now occluded. Grafts patent, increased collaterals to compensate. Discussed with pt. Educated on collaterals. Per pt request send in nitro PRN to [facility]. If symptoms worsen call or go to ER

## 2024-01-02 NOTE — TELEPHONE ENCOUNTER
Pt called stating DCE did a procedure on him and he, cannot remember everything that was said to him that was done and pt is wanting to know if the RN can call him to explain everything.    Pls advise thank you [phone]

## 2024-01-31 ENCOUNTER — OFFICE VISIT (OUTPATIENT)
Dept: CARDIOLOGY CLINIC | Age: 54
End: 2024-01-31
Payer: COMMERCIAL

## 2024-01-31 VITALS
WEIGHT: 227 LBS | HEIGHT: 71 IN | DIASTOLIC BLOOD PRESSURE: 90 MMHG | HEART RATE: 60 BPM | SYSTOLIC BLOOD PRESSURE: 142 MMHG | BODY MASS INDEX: 31.78 KG/M2 | OXYGEN SATURATION: 98 %

## 2024-01-31 DIAGNOSIS — I25.83 CORONARY ARTERY DISEASE DUE TO LIPID RICH PLAQUE: Primary | ICD-10-CM

## 2024-01-31 DIAGNOSIS — I25.10 CORONARY ARTERY DISEASE DUE TO LIPID RICH PLAQUE: Primary | ICD-10-CM

## 2024-01-31 DIAGNOSIS — E78.2 MIXED HYPERLIPIDEMIA: ICD-10-CM

## 2024-01-31 DIAGNOSIS — I10 PRIMARY HYPERTENSION: ICD-10-CM

## 2024-01-31 PROCEDURE — 99214 OFFICE O/P EST MOD 30 MIN: CPT | Performed by: NURSE PRACTITIONER

## 2024-01-31 PROCEDURE — 3074F SYST BP LT 130 MM HG: CPT | Performed by: NURSE PRACTITIONER

## 2024-01-31 PROCEDURE — 3079F DIAST BP 80-89 MM HG: CPT | Performed by: NURSE PRACTITIONER

## 2024-01-31 RX ORDER — NITROGLYCERIN 80 MG/1
1 PATCH TRANSDERMAL DAILY
Qty: 30 PATCH | Refills: 3 | Status: SHIPPED | OUTPATIENT
Start: 2024-01-31

## 2024-01-31 NOTE — PROGRESS NOTES
- 5.0 mEq/L 4.4     CHLORIDE 98 - 107 mEq/L 106     CO2 22 - 29 mmol/L 23     GLUCOSE, RANDOM 74 - 100 mg/dL 101 High      BLD UREA NITROGEN 6 - 20 mg/dL 15     CREATININE 0.67 - 1.30 mg/dL 0.88     CALCIUM 8.6 - 10.4 mg/dL 9.2     TOTAL PROTEIN 6.4 - 8.3 gm/dL 7.0     ALBUMIN 3.5 - 5.2 gm/dL 4.5     TOTAL BILIRUBIN 0.1 - 1.4 mg/dL 0.5     ALK PHOSPHATASE 40 - 129 U/L 121     AST <=40 U/L 20     ALT <=41 U/L 27     ESTIMATED GFR >=60 mL/min/1.73 m2 104       CHOLESTEROL <200 mg/dL 107     TRIGLYCERIDE <150 mg/dL 254 High      HDL CHOLESTEROL >=40 mg/dL 28 Low      LDL CHOLESTEROL (CALCULATED) <100 mg/dL 40     TOTAL NON HDL CHOL <=129 mg/dL 79       HEMOGLOBIN A1C 4.2 - 5.6 % 5.7 High      7/24/23    Component  Ref Range & Units 6 mo ago Comments   CHOLESTEROL  <200 mg/dL 175 (NOTE)  < 200            Desirable  200 - 239     Borderline High  >= 240          High   TRIGLYCERIDE  <150 mg/dL 595 High  (NOTE)  < 150         Normal  150 - 199    Borderline High  200 - 499    High   >= 500       Very High   HDL CHOLESTEROL  >=40 mg/dL 30 Low  (NOTE)   > 60      Optimal  40 - 60    Acceptable    < 40      Low   LDL CHOLESTEROL (CALCULATED)  <100 mg/dL 56    TOTAL NON HDL CHOL  <=129 mg/dL 145 High       Radiology Review:  Pertinent images / reports were reviewed as a part of this visit and reveals the following:        Angiogram: 9/25/22  Findings  Artery Findings/Result   LM 90% distal   LAD 70% prox, patent mid stent, competitive mid flow from LIMA   Cx 90% ostial, OM1 100% mid   RI N/A   RCA 95% mid,70% mid, competitive distal flow   S-PDA patent   S-% prox   L-LAD Patent, 40% distal anastomotic tapering   LVEDP 6   LVG NA      Intervention(s)  Artery Location Intervention Result   OM1 Os-mid 2.5X28 overlapping prox 2.5X15 Xience KARLY No residual   LM-Cx Dis-prox 3.5X15 Xience KARLY (PD 3.5NC, prox to mid PD 4.0NC) No residual   LM-Cx Dis-prox MLA 2.13, Cx prox 3.5, LM distal 4.0        Post Cath Dx:       Failed

## 2024-01-31 NOTE — PATIENT INSTRUCTIONS
Nitrodur patch 0.4mg / hr : apply to leg in morning and take off at night    Keep appt with Dr. Zafar in April

## 2024-04-08 RX ORDER — TICAGRELOR 90 MG/1
TABLET ORAL
Qty: 180 TABLET | Refills: 3 | Status: SHIPPED | OUTPATIENT
Start: 2024-04-08

## 2024-04-08 NOTE — TELEPHONE ENCOUNTER
Received refill request for BRILINTA 90 MG TABS tablet and metoprolol tartrate (LOPRESSOR) 25 MG tablet from [facility] pharmacy.     Last OV: 01/31/2024 NPTS    Next OV: 04/25/2024 NPTS    Last Labs: 12/29/2023 BMP, 12/29/2023 EKG    Last Filled: 05/08/2023 NPTS

## 2024-04-19 ENCOUNTER — TELEPHONE (OUTPATIENT)
Dept: CARDIOLOGY CLINIC | Age: 54
End: 2024-04-19

## 2024-04-19 NOTE — TELEPHONE ENCOUNTER
Submitted PA for REPATHA  Via [facility] (Key: [id number] STATUS: PENDING.    Follow up done daily; if no decision with in three days we will refax.  If another three days goes by with no decision will call the insurance for status.

## 2024-04-22 NOTE — PROGRESS NOTES
[facility]  H+P  Consult  OP Visit  FU Visit   CC/HPI   CC Followup visit for cardiac conditions detailed in assessment and plan below.   Intervention PCI, CABG   General Doing fair.  Concerned with occasional cp but stable and less frequent.   Working out on regular basis.     Cardiac Sx -SOB, -dizziness, -syncope, -edema, -orthopnea, -pnd, -fatigue, +CP   HISTORY/ALLERGY/ROS   M/S/S/F Hx Reviewed in Epic and updated as appropriate   ALLERG Fenofibrate and Statins   ROS Full ROS obtained and negative except as mentioned in HPI   MEDICATIONS   Cardiac medications reviewed in Epic during visit with patient.   PHYSICAL EXAM   Vitals /72 (Site: Left Upper Arm, Position: Sitting, Cuff Size: Medium Adult)   Pulse 75   Ht 1.803 m (5' 11\")   Wt 102.1 kg (225 lb)   SpO2 98%   BMI 31.38 kg/m²    Gen Alert, coop, no distress Heart  RRR, no MRG   Lung CTA-B, unlabored, no DTP Extrem Edema -Grade 0 (0mm)      COMPLIANCE   Discussed and counseled on diet, exercise, weight loss, smoking, alcohol, drugs.  All questions answered.   CODING   SCI (83693) - 30-39 mins spent reviewing hx/tests/consults, performing exam, counseling/educating, ordering meds/tests/procedures, referring/communicating w/PCP/consultants, documenting in EMR, interpreting results, communicating medical information and plan with family.   SCRIBE ATTESTATION   Nurse I, Claudia Nelson RN, am scribing for and in the presence of Carlos Zafar MD. 4/22/2024 9:25 AM EDT   Doctor Claudia Nelson is working as scribe for and in presence of me and may have prepopulated components of note with my historical intellectual property (IP) under my supervision.  Any additions to this IP performed in my presence and at my direction. Content and accuracy of this note reviewed by me (Carlos Zafar MD).   NEW MEDICATIONS   Pt verbalizes understanding of the need for treatment and education provided at today's visit.  Additional education provided in AVS.

## 2024-04-25 ENCOUNTER — OFFICE VISIT (OUTPATIENT)
Dept: CARDIOLOGY CLINIC | Age: 54
End: 2024-04-25
Payer: COMMERCIAL

## 2024-04-25 VITALS
SYSTOLIC BLOOD PRESSURE: 104 MMHG | DIASTOLIC BLOOD PRESSURE: 72 MMHG | OXYGEN SATURATION: 98 % | BODY MASS INDEX: 31.5 KG/M2 | HEART RATE: 75 BPM | WEIGHT: 225 LBS | HEIGHT: 71 IN

## 2024-04-25 DIAGNOSIS — Z95.1 HX OF CABG: ICD-10-CM

## 2024-04-25 DIAGNOSIS — I10 ESSENTIAL HYPERTENSION: ICD-10-CM

## 2024-04-25 DIAGNOSIS — I25.10 CORONARY ARTERY DISEASE DUE TO LIPID RICH PLAQUE: Primary | ICD-10-CM

## 2024-04-25 DIAGNOSIS — E78.00 HYPERCHOLESTEROLEMIA: ICD-10-CM

## 2024-04-25 DIAGNOSIS — I25.83 CORONARY ARTERY DISEASE DUE TO LIPID RICH PLAQUE: Primary | ICD-10-CM

## 2024-04-25 PROCEDURE — 3074F SYST BP LT 130 MM HG: CPT | Performed by: INTERNAL MEDICINE

## 2024-04-25 PROCEDURE — 3078F DIAST BP <80 MM HG: CPT | Performed by: INTERNAL MEDICINE

## 2024-04-25 PROCEDURE — 99214 OFFICE O/P EST MOD 30 MIN: CPT | Performed by: INTERNAL MEDICINE

## 2024-04-25 RX ORDER — NITROGLYCERIN 80 MG/1
1 PATCH TRANSDERMAL DAILY
Qty: 90 PATCH | Refills: 3 | Status: SHIPPED | OUTPATIENT
Start: 2024-04-25

## 2024-04-26 RX ORDER — EZETIMIBE 10 MG/1
10 TABLET ORAL DAILY
Qty: 90 TABLET | Refills: 3 | Status: SHIPPED | OUTPATIENT
Start: 2024-04-26

## 2024-04-26 NOTE — TELEPHONE ENCOUNTER
Requested Prescriptions     Pending Prescriptions Disp Refills    ezetimibe (ZETIA) 10 MG tablet [Pharmacy Med Name: Ezetimibe 10 MG Oral Tablet] 90 tablet 3     Sig: TAKE 1 TABLET BY MOUTH DAILY        [facility]      Last OV:  4/25/2024 DCE    Next OV: 10/31/2024 DCE    Last Labs: 09/27/2022 Lipid    Last Filled: 05/25/2023dce

## 2024-05-20 NOTE — TELEPHONE ENCOUNTER
Received refill request for REPATHA SURECLICK 140 MG/ML SOAJ  from [facility].     Last OV: 4- DCE    Next OV: 10- DCE    Last Labs: 7-  Lipid at [facility]    Last Filled: 7- NPTS

## 2024-05-21 RX ORDER — EVOLOCUMAB 140 MG/ML
INJECTION, SOLUTION SUBCUTANEOUS
Qty: 6 ML | Refills: 3 | Status: SHIPPED | OUTPATIENT
Start: 2024-05-21

## 2024-08-06 RX ORDER — RANOLAZINE 1000 MG/1
1000 TABLET, EXTENDED RELEASE ORAL 2 TIMES DAILY
Qty: 180 TABLET | Refills: 3 | Status: SHIPPED | OUTPATIENT
Start: 2024-08-06

## 2024-08-06 NOTE — TELEPHONE ENCOUNTER
Received refill request for ranolazine (RANEXA) 1000 MG  from [facility] home delivery pharmacy.     Last OV: 4/25/24    Next OV: 10/31/24    Last Labs:     Last Filled: 9/21/23

## 2024-10-30 NOTE — PROGRESS NOTES
[facility]  H+P  Consult  OP Visit  FU Visit   CC/HPI   CC Followup visit for cardiac conditions detailed in assessment and plan below.   Intervention PCI, CABG   General Doing fair.  Concerned with stable angina.     Cardiac Sx -SOB, -dizziness, -syncope, -edema, -orthopnea, -pnd, -fatigue, +CP   HISTORY/ALLERGY/ROS   M/S/S/F Hx Reviewed in Epic and updated as appropriate   ALLERG Fenofibrate and Statins   ROS Full ROS obtained and negative except as mentioned in HPI   MEDICATIONS   Cardiac medications reviewed in Epic during visit with patient.   PHYSICAL EXAM   Vitals BP (!) 132/92 (Site: Left Upper Arm, Position: Sitting, Cuff Size: Medium Adult)   Pulse 54   Ht 1.803 m (5' 11\")   Wt 98 kg (216 lb)   SpO2 99%   BMI 30.13 kg/m²    Gen Alert, coop, no distress Heart  RRR, no MRG   Lung CTA-B, unlabored, no DTP Extrem Edema -Grade 0 (0mm)      COMPLIANCE   Discussed and counseled on diet, exercise, weight loss, smoking, alcohol, drugs.  All questions answered.   CODING   SCI (48853) - 30-39 mins spent reviewing hx/tests/consults, performing exam, counseling/educating, ordering meds/tests/procedures, referring/communicating w/PCP/consultants, documenting in EMR, interpreting results, communicating medical information and plan with family.   SCRIBE ATTESTATION   Nurse I, Claudia Nelson, RN, am scribing for and in the presence of Carlos Zafar MD. 10/30/2024 6:22 AM EDT   Doctor Claudia Nelson is working as scribe for and in presence of me and may have prepopulated components of note with my historical intellectual property (IP) under my supervision.  Any additions to this IP performed in my presence and at my direction. Content and accuracy of this note reviewed by me (Carlos Zafar MD).   NEW MEDICATIONS   Pt verbalizes understanding of the need for treatment and education provided at today's visit.  Additional education provided in AVS.   ASSESSMENT AND PLAN   *CAD    Date EF Results   Sx     Angina

## 2024-10-31 ENCOUNTER — OFFICE VISIT (OUTPATIENT)
Dept: CARDIOLOGY CLINIC | Age: 54
End: 2024-10-31
Payer: COMMERCIAL

## 2024-10-31 VITALS
SYSTOLIC BLOOD PRESSURE: 132 MMHG | HEIGHT: 71 IN | WEIGHT: 216 LBS | OXYGEN SATURATION: 99 % | BODY MASS INDEX: 30.24 KG/M2 | HEART RATE: 54 BPM | DIASTOLIC BLOOD PRESSURE: 92 MMHG

## 2024-10-31 DIAGNOSIS — I25.83 CORONARY ARTERY DISEASE DUE TO LIPID RICH PLAQUE: Primary | ICD-10-CM

## 2024-10-31 DIAGNOSIS — I10 ESSENTIAL HYPERTENSION: ICD-10-CM

## 2024-10-31 DIAGNOSIS — I25.10 CORONARY ARTERY DISEASE DUE TO LIPID RICH PLAQUE: Primary | ICD-10-CM

## 2024-10-31 DIAGNOSIS — E78.00 HYPERCHOLESTEROLEMIA: ICD-10-CM

## 2024-10-31 DIAGNOSIS — Z95.1 HX OF CABG: ICD-10-CM

## 2024-10-31 PROCEDURE — 3075F SYST BP GE 130 - 139MM HG: CPT | Performed by: INTERNAL MEDICINE

## 2024-10-31 PROCEDURE — 99214 OFFICE O/P EST MOD 30 MIN: CPT | Performed by: INTERNAL MEDICINE

## 2024-10-31 PROCEDURE — 3080F DIAST BP >= 90 MM HG: CPT | Performed by: INTERNAL MEDICINE

## 2025-02-17 DIAGNOSIS — Z95.1 HX OF CABG: ICD-10-CM

## 2025-02-17 DIAGNOSIS — I25.10 CORONARY ARTERY DISEASE DUE TO LIPID RICH PLAQUE: ICD-10-CM

## 2025-02-17 DIAGNOSIS — I25.83 CORONARY ARTERY DISEASE DUE TO LIPID RICH PLAQUE: ICD-10-CM

## 2025-02-17 RX ORDER — NITROGLYCERIN 80 MG/1
PATCH TRANSDERMAL
Qty: 90 PATCH | Refills: 3 | Status: SHIPPED | OUTPATIENT
Start: 2025-02-17

## 2025-02-17 NOTE — TELEPHONE ENCOUNTER
Received refill request for  nitroGLYCERIN (NITRODUR) 0.4 MG/HR  from [facility].     Last OV: 10/31/24    Next OV: 5/1/25    Last Labs:     Last Filled: 4/25/24

## 2025-02-24 RX ORDER — TICAGRELOR 90 MG/1
TABLET ORAL
Qty: 180 TABLET | Refills: 3 | Status: SHIPPED | OUTPATIENT
Start: 2025-02-24

## 2025-02-24 RX ORDER — METOPROLOL TARTRATE 25 MG/1
TABLET, FILM COATED ORAL
Qty: 180 TABLET | Refills: 3 | Status: SHIPPED | OUTPATIENT
Start: 2025-02-24

## 2025-02-24 NOTE — TELEPHONE ENCOUNTER
Requested Prescriptions     Pending Prescriptions Disp Refills    metoprolol tartrate (LOPRESSOR) 25 MG tablet [Pharmacy Med Name: Metoprolol Tartrate 25 MG Oral Tablet] 180 tablet 3     Sig: TAKE 1 TABLET BY MOUTH TWICE  DAILY    BRILINTA 90 MG TABS tablet [Pharmacy Med Name: Brilinta 90 MG Oral Tablet] 180 tablet 3     Sig: TAKE 1 TABLET BY MOUTH TWICE  DAILY       [facility] - [address] [phone] -  [phone]      LOV-10/31/24  NOV-5/1/25  [facility]-4/8/24

## 2025-03-12 RX ORDER — EZETIMIBE 10 MG/1
10 TABLET ORAL DAILY
Qty: 90 TABLET | Refills: 3 | Status: SHIPPED | OUTPATIENT
Start: 2025-03-12

## 2025-03-12 NOTE — TELEPHONE ENCOUNTER
Received refill request for Ezetimibe 10 MG Oral Tablet  from [facility].     Last OV: 10/31/24    Next OV: 5/1/25    Last Labs: lipid 10/4/22    Last Filled: 4/26/24

## 2025-03-31 NOTE — TELEPHONE ENCOUNTER
Requested Prescriptions     Pending Prescriptions Disp Refills    REPATHA SURECLICK 140 MG/ML SOAJ [Pharmacy Med Name: Repatha SureClick 140 MG/ML Subcutaneous Solution Auto-injector] 6 mL 3     Sig: INJECT 1 PEN SUBCUTANEOUSLY  EVERY 2 WEEKS      Last OV:  10/31/2024     Next OV: 5/1/2025 DCE    Last Labs: 07/24/2023 Lipid    Last Filled: 05/21/2024 NPTS

## 2025-04-02 RX ORDER — EVOLOCUMAB 140 MG/ML
INJECTION, SOLUTION SUBCUTANEOUS
Qty: 6 ML | Refills: 3 | Status: SHIPPED | OUTPATIENT
Start: 2025-04-02

## 2025-05-02 ENCOUNTER — TELEPHONE (OUTPATIENT)
Dept: CARDIOLOGY CLINIC | Age: 55
End: 2025-05-02

## 2025-05-02 NOTE — TELEPHONE ENCOUNTER
Submitted PA for Repatha SureClick 140MG/ML auto-injectors   Via CMM Key: [id number]  STATUS: PENDING.    Follow up done daily; if no decision with in three days we will refax.  If another three days goes by with no decision will call the insurance for status.

## 2025-05-02 NOTE — TELEPHONE ENCOUNTER
Please submit for PA:       REPATHA SURECLICK 140 MG/ML SOAJ     Dx: I25.10, E78.2, I24.4    Thanks!

## 2025-05-20 NOTE — PROGRESS NOTES
[facility]  H+P  Consult  OP Visit  FU Visit   CC/HPI   CC Followup visit for cardiac conditions detailed in assessment and plan below.   Intervention PCI, CABG   General Doing fair.  Concerned with chronic and unchanged cp.  Cp substernal, exertional, relieved with rest, sob.  No change from last angio.  Known severe disease in Cx unrevascularizable.    Cardiac Sx -dizziness, -syncope, -edema, -orthopnea, -pnd, -palpitations, +CP, +SOB, +fatigue   HISTORY/ALLERGY/ROS   M/S/S/F Hx Reviewed in Epic and updated as appropriate   ALLERG Fenofibrate and Statins   ROS Full ROS obtained and negative except as mentioned in HPI   MEDICATIONS   Cardiac medications reviewed in Epic during visit with patient.   PHYSICAL EXAM   Vitals /72 (BP Site: Right Upper Arm, Patient Position: Sitting, BP Cuff Size: Medium Adult)   Pulse 54   Ht 1.803 m (5' 10.98\")   Wt 94 kg (207 lb 3.7 oz)   SpO2 99%   BMI 28.92 kg/m²    Gen Alert, coop, no distress Heart  RRR, no MRG   Lung CTA-B, unlabored, no DTP Extrem Edema -Grade 0 (0mm)      COMPLIANCE   Discussed and counseled on diet, exercise, weight loss, smoking, alcohol, drugs.  All questions answered.   CODING   SCI (13015) - 30-39 mins spent reviewing hx/tests/consults, performing exam, counseling/educating, ordering meds/tests/procedures, referring/communicating w/PCP/consultants, documenting in EMR, interpreting results, communicating medical information and plan with family.   SCRIBE ATTESTATION   Nurse I, Claudia Nelson RN, am scribing for and in the presence of Carlos Zafar MD. 5/20/2025 8:23 AM EDT   Doctor Claudia Nelson is working as scribe for and in presence of me and may have prepopulated components of note with my historical intellectual property (IP) under my supervision.  Any additions to this IP performed in my presence and at my direction. Content and accuracy of this note reviewed by me (Carlos Zafar MD).   NEW MEDICATIONS   Pt verbalizes

## 2025-05-29 ENCOUNTER — OFFICE VISIT (OUTPATIENT)
Age: 55
End: 2025-05-29
Payer: COMMERCIAL

## 2025-05-29 VITALS
HEART RATE: 54 BPM | OXYGEN SATURATION: 99 % | SYSTOLIC BLOOD PRESSURE: 110 MMHG | BODY MASS INDEX: 29.01 KG/M2 | HEIGHT: 71 IN | WEIGHT: 207.23 LBS | DIASTOLIC BLOOD PRESSURE: 72 MMHG

## 2025-05-29 DIAGNOSIS — I25.83 CORONARY ARTERY DISEASE DUE TO LIPID RICH PLAQUE: Primary | ICD-10-CM

## 2025-05-29 DIAGNOSIS — Z95.1 HX OF CABG: ICD-10-CM

## 2025-05-29 DIAGNOSIS — I10 ESSENTIAL HYPERTENSION: ICD-10-CM

## 2025-05-29 DIAGNOSIS — I25.10 CORONARY ARTERY DISEASE DUE TO LIPID RICH PLAQUE: Primary | ICD-10-CM

## 2025-05-29 DIAGNOSIS — E78.00 HYPERCHOLESTEROLEMIA: ICD-10-CM

## 2025-05-29 PROCEDURE — 3074F SYST BP LT 130 MM HG: CPT | Performed by: INTERNAL MEDICINE

## 2025-05-29 PROCEDURE — 3078F DIAST BP <80 MM HG: CPT | Performed by: INTERNAL MEDICINE

## 2025-05-29 PROCEDURE — 99214 OFFICE O/P EST MOD 30 MIN: CPT | Performed by: INTERNAL MEDICINE

## 2025-05-29 RX ORDER — CLOPIDOGREL BISULFATE 75 MG/1
75 TABLET ORAL DAILY
Qty: 90 TABLET | Refills: 3 | Status: SHIPPED | OUTPATIENT
Start: 2025-05-29

## 2025-07-10 RX ORDER — RANOLAZINE 1000 MG/1
1000 TABLET, EXTENDED RELEASE ORAL 2 TIMES DAILY
Qty: 180 TABLET | Refills: 3 | Status: SHIPPED | OUTPATIENT
Start: 2025-07-10

## 2025-07-10 NOTE — TELEPHONE ENCOUNTER
Received refill request for ranolazine (RANEXA) 1000 MG  from [facility].     Last OV: 5/29/2025 DCE    Next OV: 9/25/2025 DCE    Last Labs: 12/302023 EKG    Please send a replace/new response with 90-Day Supply if appropriate to maximize member benefit. Requesting 1 year supply.

## (undated) DEVICE — HYPODERMIC SAFETY NEEDLE: Brand: MAGELLAN

## (undated) DEVICE — LABEL MED CUST CVR

## (undated) DEVICE — SYRINGE, LUER LOCK, 10ML: Brand: MEDLINE

## (undated) DEVICE — PAD THRM M SPL TORSO

## (undated) DEVICE — EVERGRIP INSERT SET 61MM: Brand: FOGARTY EVERGRIP

## (undated) DEVICE — SET TRNQT W/ STD 7IN 12FR 5 TB 1 SNR DLP

## (undated) DEVICE — ELECTRODE PT RET AD L9FT HI MOIST COND ADH HYDRGEL CORDED

## (undated) DEVICE — GLOVE SURG SZ 75 L12IN FNGR THK94MIL STD WHT LTX FREE

## (undated) DEVICE — OPEN HRT BASIC PK

## (undated) DEVICE — SUTURE PERMA-HAND SZ 4-0 L144IN NONABSORBABLE BLK LIGAPAK LA53G

## (undated) DEVICE — DRAPE THER FLUID WARMING 66X44 IN FLAT SLUSH DBL DISC ORS

## (undated) DEVICE — SUTURE VCRL SZ 2-0 L36IN ABSRB UD L36MM CT-1 1/2 CIR J945H

## (undated) DEVICE — APPLIER CLP L9.38IN M LIG TI DISP STR RNG HNDL LIGACLP

## (undated) DEVICE — DRESSING GERM DIA1IN CNTR H DIA7MM BLU CHG ANTIMIC PROTCT

## (undated) DEVICE — SUTURE VCRL SZ 2 L27IN ABSRB VLT L65MM TP-1 1/2 CIR J649G

## (undated) DEVICE — CONTAINER,SPECIMEN,OR STERILE,4OZ: Brand: MEDLINE

## (undated) DEVICE — SUTURE SZ 7 L18IN NONABSORBABLE SIL CCS L48MM 1/2 CIR STRNM M655G

## (undated) DEVICE — SUTURE PROL SZ 7-0 L24IN NONABSORBABLE BLU L8MM BV175-6 3/8 8735H

## (undated) DEVICE — NEEDLE HYPO 27GA L0.5IN GRY POLYPR HUB S STL REG BVL STR

## (undated) DEVICE — SUTURE VCRL SZ 3-0 L27IN ABSRB UD L26MM SH 1/2 CIR J416H

## (undated) DEVICE — BLADE ES ELASTOMERIC COAT INSUL DURABLE BEND UPTO 90DEG

## (undated) DEVICE — EZE-BAND LF ST 4X5.5 36/CS: Brand: EZE-BAND LF ST 4X5.5 36/CS

## (undated) DEVICE — COVER,MAYO STAND,XL,STERILE: Brand: MEDLINE

## (undated) DEVICE — SYRINGE, LUER LOCK, 60ML: Brand: MEDLINE

## (undated) DEVICE — BOWL MED L 32OZ PLAS W/ MOLD GRAD EZ OPN PEEL PCH

## (undated) DEVICE — PREVENA INCISION MANAGEMENT SYSTEM- PEEL & PLACE DRESSING: Brand: PREVENA™ PEEL & PLACE™

## (undated) DEVICE — SYSTEM ENDOSCP VES HARV W/ TOOL CANN SEAL SHT PRT BLNT TIP

## (undated) DEVICE — CONTAINER STOR SURG SLUSH

## (undated) DEVICE — CONNECTOR PERF W3/8XH0.25XL0.25IN BASE UNEQUAL Y SHP W/O

## (undated) DEVICE — DRAIN SURG SGL COLL PT TB FOR ATS BG OASIS

## (undated) DEVICE — 20 ML SYRINGE LUER-LOCK TIP: Brand: MONOJECT

## (undated) DEVICE — DECANTER FLD 9IN ST BG FOR ASEP TRNSF OF FLD

## (undated) DEVICE — LEAD PACE L475MM CHNL A OR V MYOCARDIAL STEROID ELUT SIL

## (undated) DEVICE — BLADE ES L4IN INSUL EDGE

## (undated) DEVICE — DRESSING TRNSPAR W FAB BORD SORBAVIEW ULT 475X425IN

## (undated) DEVICE — BASIC SINGLE BASIN 1-LF: Brand: MEDLINE INDUSTRIES, INC.

## (undated) DEVICE — BLADE RETRCT 3 SH FNGR ASST

## (undated) DEVICE — STERNUM BLADE, OFFSET (31.7 X 0.64 X 6.3MM)

## (undated) DEVICE — SUTURE ETHBND EXCEL SZ 0 L18IN NONABSORBABLE GRN L36MM CT-1 CX21D

## (undated) DEVICE — SUTURE VCRL SZ 4-0 L18IN ABSRB UD L19MM PS-2 3/8 CIR PRIM J496H

## (undated) DEVICE — 3M™ STERI-STRIP™ REINFORCED ADHESIVE SKIN CLOSURES, R1547, 1/2 IN X 4 IN (12 MM X 100 MM), 6 STRIPS/ENVELOPE: Brand: 3M™ STERI-STRIP™

## (undated) DEVICE — 3M™ TEGADERM™ TRANSPARENT FILM DRESSING FRAME STYLE, 1626W, 4 IN X 4-3/4 IN (10 CM X 12 CM), 50/CT 4CT/CASE: Brand: 3M™ TEGADERM™

## (undated) DEVICE — BANDAGE COMPR W6INXL10YD ST M E WHITE/BEIGE

## (undated) DEVICE — APPLICATOR MEDICATED 3 CC SOLUTION CLR STRL CHLORAPREP

## (undated) DEVICE — NEEDLE SPNL L3.5IN PNK HUB S STL REG WALL FIT STYL W/ QNCKE

## (undated) DEVICE — GOWN SIRUS NONREIN XL W/TWL: Brand: MEDLINE INDUSTRIES, INC.

## (undated) DEVICE — INDICATED FOR SURGICAL CLAMPING DURING CARDIOVASCULAR PERIPHERAL VASCULAR, AND GENERAL SURGERY.: Brand: SOFT/FIBRA® SPRING CLIP

## (undated) DEVICE — SPONGE LAP W18XL18IN WHT COT 4 PLY FLD STRUNG RADPQ DISP ST

## (undated) DEVICE — NEEDLE HYPO 18GA L1.5IN THN WALL PIVOTING SHLD BVL ORIENTED

## (undated) DEVICE — SUTURE PERMAHAND SZ 2-0 L30IN NONABSORBABLE BLK SILK W/O A305H

## (undated) DEVICE — SET ADMIN PRIMING 67ML L105IN NVENT 180UM FLTR 3 RLER CLMP

## (undated) DEVICE — SUTURE NONABSORBABLE MONOFILAMENT 6-0 C-1 1X30 IN PROLENE 8706H

## (undated) DEVICE — SWAN-GANZ CCOMBO V THERMODILUTION CATHETER: Brand: SWAN-GANZ CCOMBO V

## (undated) DEVICE — SUTURE PROL SZ 8-0 L24IN NONABSORBABLE BLU L6.5MM BV130-5 8732H

## (undated) DEVICE — APPLICATOR PREP 26ML 0.7% IOD POVACRYLEX 74% ISO ALC ST

## (undated) DEVICE — GEL US 20GM NONIRRITATING OVERWRAPPED FILE PCH TRNSMIT

## (undated) DEVICE — [HIGH FLOW INSUFFLATOR,  DO NOT USE IF PACKAGE IS DAMAGED,  KEEP DRY,  KEEP AWAY FROM SUNLIGHT,  PROTECT FROM HEAT AND RADIOACTIVE SOURCES.]: Brand: PNEUMOSURE

## (undated) DEVICE — SUTURE ETHBND SZ 3-0 L30IN NONABSORBABLE GRN SH L26MM 1/2 X562H

## (undated) DEVICE — SUTURE PERMA-HAND SZ 2 L60IN NONABSORBABLE BLK SILK BRAID SA8H

## (undated) DEVICE — GLOVE ORTHO 7 1/2   MSG9475

## (undated) DEVICE — 3 ML SYRINGE LUER-LOCK TIP: Brand: MONOJECT

## (undated) DEVICE — BLADE SURG NO12 S STL STR DISP GLASSVAN

## (undated) DEVICE — GAUZE,SPONGE,4"X4",8PLY,STRL,LF,10/TRAY: Brand: MEDLINE

## (undated) DEVICE — AORTIC PNCH 4.0MM 8IN BX 10 DISP

## (undated) DEVICE — TOWEL 26X17IN 2 PER PACK COTTON DELINTED

## (undated) DEVICE — FAIRFIELD CABG ACCESSARY PK

## (undated) DEVICE — APPLIER CLP L9.375IN APER 2.1MM CLS L3.8MM 20 SM TI CLP

## (undated) DEVICE — SUTURE ETHBND EXCEL SZ 2-0 L36IN NONABSORBABLE GRN SH-2 X559H

## (undated) DEVICE — PRESSURE MONITORING SET: Brand: TRUWAVE, VAMP PLUS

## (undated) DEVICE — SUPPORT WRST AD W3.5XL9IN DIA14.5IN ART SFT ADJ HK AND LOOP

## (undated) DEVICE — CATHETER ETER IV 22GA L1IN POLYUR STR RADPQ INTROCAN SFTY

## (undated) DEVICE — SUTURE PDS II SZ 0 L27IN ABSRB VLT L36MM CT-1 1/2 CIR Z340H

## (undated) DEVICE — WAX SURG 2.5GM HEMSTAT BNE BEESWAX PARAFFIN ISO PALMITATE

## (undated) DEVICE — DRAIN SURG 24FR BLAK SIL HUBLESS 4 CHANNELED RADPQ EXTN TB

## (undated) DEVICE — INTENDED FOR TISSUE SEPARATION, AND OTHER PROCEDURES THAT REQUIRE A SHARP SURGICAL BLADE TO PUNCTURE OR CUT.: Brand: BARD-PARKER ® STAINLESS STEEL BLADES

## (undated) DEVICE — DRESSING WND SM W2.5XL4IN BRTH W/ CATH SECUREMENT AND

## (undated) DEVICE — CATH CTR VEN 3LUM INTRLNK INJ 9FRX11CM

## (undated) DEVICE — GLOVE SURG SZ 7 L11.33IN FNGR THK9.8MIL STRW LTX POLYMER

## (undated) DEVICE — TUBING, SUCTION, 1/4" X 10', STRAIGHT: Brand: MEDLINE

## (undated) DEVICE — SURGICAL PROCEDURE PACK PROC SMARTPREP 2

## (undated) DEVICE — SUTURE NONABSORBABLE MONOFILAMENT 4-0 RB-1 36 IN BLU PROLENE 8557H

## (undated) DEVICE — TTL1LYR 16FR10ML 100%SIL TMPST TR: Brand: MEDLINE

## (undated) DEVICE — SET CATH 20GA L1.75IN RAD ART POLYUR RADPQ W/ INTEGR

## (undated) DEVICE — BLADE OPHTH 180DEG CUT SURF BLU STR SHRP DBL BVL GRINDLESS

## (undated) DEVICE — SYRINGE, LUER LOCK, 30ML: Brand: MEDLINE

## (undated) DEVICE — BLANKET WRM CARD FOR MISTRAL AIR WRM SYS

## (undated) DEVICE — SUTURE NONABSORBABLE MONOFILAMENT 5-0 C-1 1X24 IN PROLENE 8725H

## (undated) DEVICE — MERCY FAIRFIELD TURNOVER KIT: Brand: MEDLINE INDUSTRIES, INC.

## (undated) DEVICE — 12 FOOT DISPOSABLE EXTENSION CABLE WITH SAFE CONNECT / SCREW-DOWN